# Patient Record
Sex: MALE | Race: ASIAN | NOT HISPANIC OR LATINO | Employment: OTHER | URBAN - METROPOLITAN AREA
[De-identification: names, ages, dates, MRNs, and addresses within clinical notes are randomized per-mention and may not be internally consistent; named-entity substitution may affect disease eponyms.]

---

## 2017-02-08 DIAGNOSIS — I10 BENIGN ESSENTIAL HYPERTENSION: Primary | ICD-10-CM

## 2017-02-08 RX ORDER — LOSARTAN POTASSIUM 25 MG/1
25 TABLET ORAL DAILY
Qty: 30 TABLET | Refills: 0 | Status: SHIPPED | OUTPATIENT
Start: 2017-02-08 | End: 2017-02-27

## 2017-02-08 NOTE — TELEPHONE ENCOUNTER
losartan (COZAAR) 25 MG tablet  Last Written Prescription Date:  2/23/16  Last Fill Quantity: 90,   # refills: 3  Last Office Visit with G, P or [facility] prescribing provider: 3/9/16  Future Office visit:   None    POTASSIUM   Date Value Ref Range Status   03/04/2016 4.0 3.4 - 5.3 mmol/L Final   ]  CREATININE   Date Value Ref Range Status   03/04/2016 0.83 0.66 - 1.25 mg/dL Final     BP Readings from Last 3 Encounters:   03/09/16 134/82   02/23/16 162/105   01/26/16 146/88     30 tabs to pharmacy. Annual f/u due next mth.

## 2017-02-27 DIAGNOSIS — I10 BENIGN ESSENTIAL HYPERTENSION: ICD-10-CM

## 2017-02-27 DIAGNOSIS — I51.4 MYOCARDITIS (H): ICD-10-CM

## 2017-02-27 RX ORDER — LOSARTAN POTASSIUM 25 MG/1
25 TABLET ORAL DAILY
Qty: 30 TABLET | Refills: 0 | Status: SHIPPED | OUTPATIENT
Start: 2017-02-27 | End: 2017-03-13

## 2017-02-27 RX ORDER — METOPROLOL SUCCINATE 25 MG/1
25 TABLET, EXTENDED RELEASE ORAL DAILY
Qty: 90 TABLET | Refills: 3 | Status: SHIPPED | OUTPATIENT
Start: 2017-02-27 | End: 2017-02-27

## 2017-02-27 RX ORDER — METOPROLOL SUCCINATE 25 MG/1
25 TABLET, EXTENDED RELEASE ORAL DAILY
Qty: 30 TABLET | Refills: 0 | Status: SHIPPED | OUTPATIENT
Start: 2017-02-27 | End: 2017-04-04

## 2017-02-27 NOTE — TELEPHONE ENCOUNTER
metoprolol (TOPROL XL) 25 MG 24 hr tablet  Last Written Prescription Date:  9/30/16  Last Fill Quantity: 90,   # refills: 1  Last Office Visit with G, P or [facility] prescribing provider: 3/9/16  Future Office visit:   None    BP Readings from Last 3 Encounters:   03/09/16 134/82   02/23/16 (!) 162/105   01/26/16 146/88       losartan (COZAAR) 25 MG tablet    Last Written Prescription Date:  2/8/17  Last Fill Quantity: 30,   # refills: 0  Potassium   Date Value Ref Range Status   03/04/2016 4.0 3.4 - 5.3 mmol/L Final   ]  Creatinine   Date Value Ref Range Status   03/04/2016 0.83 0.66 - 1.25 mg/dL Final   ]

## 2017-02-27 NOTE — LETTER
[facility]  [address]  [address] [facility] [address]      February 27, 2017      Sujata Valencia  [address]  [place] [address]        Dear Sujata,    This letter is a reminder that you are due to see your Primary Care Provider for an Annual Visit and needed labs. You must be seen by your Primary Care Provider on a yearly basis and have appropriate labs drawn for continued care and prescription refills. Please call [phone] to schedule an appointment for an Annual Visit with Dr MD. Uribe,    Primary Care Center

## 2017-03-13 DIAGNOSIS — I10 BENIGN ESSENTIAL HYPERTENSION: ICD-10-CM

## 2017-03-13 RX ORDER — LOSARTAN POTASSIUM 25 MG/1
25 TABLET ORAL DAILY
Qty: 30 TABLET | Refills: 0 | Status: SHIPPED | OUTPATIENT
Start: 2017-03-13 | End: 2017-04-04

## 2017-03-13 NOTE — LETTER
[facility] PRIMARY [facility]ARE CLIN[facility]  [address] [facility] [address]  [address] [facility] [address]      March 13, 2017      Sujata Valencia  [address] [place] [address]        Dear Sujata,    This letter is a reminder that you are due to see your Primary Care Provider for an Annual Visit. You must be seen by your Primary Care Provider on a yearly basis and have appropriate labs drawn for continued care and prescription refills. Please call [phone] to schedule an appointment for an Annual Visit with Dr Harris. You were last seen 3/19/2016.    [facility],    Primary Care Center

## 2017-03-13 NOTE — TELEPHONE ENCOUNTER
losartan (COZAAR) 25 MG tablet      Last Written Prescription Date:  2/27/2017  Last Fill Quantity: 30,   # refills: 0  Last Office Visit : 3/9/2016  Future Office visit:  Not scheduled    Potassium   Date Value Ref Range Status   03/04/2016 4.0 3.4 - 5.3 mmol/L Final   ]  Creatinine   Date Value Ref Range Status   03/04/2016 0.83 0.66 - 1.25 mg/dL Final   ]  BP Readings from Last 1 Encounters:   03/09/16 134/82       Appointment and labs due.  Last office visit and labs March 2016.  Potassium and Creatinine were done last 3/4/2016 .  Appointment reminder letter sent to patient.  Per protocol, OK to refill for 30 day supply only.

## 2017-03-23 ASSESSMENT — ENCOUNTER SYMPTOMS
STIFFNESS: 1
ARTHRALGIAS: 1
HEMATURIA: 0
DIFFICULTY URINATING: 0
DYSURIA: 1
JOINT SWELLING: 0
MUSCLE CRAMPS: 0
BACK PAIN: 1
FLANK PAIN: 0
MYALGIAS: 0
SKIN CHANGES: 0
POOR WOUND HEALING: 0
NECK PAIN: 0
NAIL CHANGES: 0
MUSCLE WEAKNESS: 0

## 2017-03-23 ASSESSMENT — ACTIVITIES OF DAILY LIVING (ADL)
ARE_THERE_FIREARMS_IN_YOUR_HOME?: N
DO_MEMBERS_OF_YOUR_HOUSEHOLD_WEAR_SEAT_BELTS?: Y
DO_MEMBERS_OF_YOUR_HOUSEHOLD_USE_SUNSCREEN?: N
ARE_THERE_SMOKE_DETECTORS_IN_YOUR_HOME?: Y
ARE_THERE_CARBON_MONOXIDE_DETECTORS_IN_YOUR_HOME?: N

## 2017-04-04 ENCOUNTER — OFFICE VISIT (OUTPATIENT)
Dept: INTERNAL MEDICINE | Facility: CLINIC | Age: 62
End: 2017-04-04

## 2017-04-04 VITALS
RESPIRATION RATE: 16 BRPM | HEIGHT: 72 IN | BODY MASS INDEX: 27.9 KG/M2 | SYSTOLIC BLOOD PRESSURE: 152 MMHG | TEMPERATURE: 97.9 F | DIASTOLIC BLOOD PRESSURE: 93 MMHG | OXYGEN SATURATION: 97 % | WEIGHT: 206 LBS | HEART RATE: 87 BPM

## 2017-04-04 DIAGNOSIS — N40.0 BENIGN NODULAR PROSTATIC HYPERPLASIA WITHOUT LOWER URINARY TRACT SYMPTOMS: ICD-10-CM

## 2017-04-04 DIAGNOSIS — I10 BENIGN ESSENTIAL HYPERTENSION: ICD-10-CM

## 2017-04-04 DIAGNOSIS — Z12.11 SCREEN FOR COLON CANCER: Primary | ICD-10-CM

## 2017-04-04 DIAGNOSIS — L98.9 SKIN LESION: ICD-10-CM

## 2017-04-04 RX ORDER — LOSARTAN POTASSIUM 25 MG/1
25 TABLET ORAL DAILY
Qty: 90 TABLET | Refills: 3 | Status: SHIPPED | OUTPATIENT
Start: 2017-04-04 | End: 2018-05-09

## 2017-04-04 RX ORDER — METOPROLOL SUCCINATE 25 MG/1
25 TABLET, EXTENDED RELEASE ORAL DAILY
Qty: 90 TABLET | Refills: 3 | Status: SHIPPED | OUTPATIENT
Start: 2017-04-04 | End: 2018-03-20

## 2017-04-04 ASSESSMENT — PAIN SCALES - GENERAL: PAINLEVEL: MILD PAIN (3)

## 2017-04-04 NOTE — PATIENT INSTRUCTIONS
Primary Care Center Medication Refill Request Information:  * Please contact your pharmacy regarding ANY request for medication refills.  ** [facility] Prescription Fax = [phone]  * Please allow 3 business days for routine medication refills.  * Please allow 5 business days for controlled substance medication refills.     Primary Care Center Test Result notification information:  *You will be notified with in 7-10 days of your appointment day regarding the results of your test.  If you are on MyChart you will be notified as soon as the provider has reviewed the results and signed off on them.    Dermatology [phone] (3rd Floor [facility])

## 2017-04-04 NOTE — MR AVS SNAPSHOT
After Visit Summary   4/4/2017    Sujata Valencia    MRN: [id number]           Patient Information     Date Of Birth          1955        Visit Information        Provider Department      4/4/2017 1:35 PM Wes Harris MD [facility]        Today's Diagnoses     Screen for colon cancer    -  1    Benign nodular prostatic hyperplasia without lower urinary tract symptoms        Skin lesion        Benign essential hypertension          Care Instructions    Primary Care Center Medication Refill Request Information:  * Please contact your pharmacy regarding ANY request for medication refills.  ** [facility] Prescription Fax = [phone]  * Please allow 3 business days for routine medication refills.  * Please allow 5 business days for controlled substance medication refills.     Primary Care Center Test Result notification information:  *You will be notified with in 7-10 days of your appointment day regarding the results of your test.  If you are on MyChart you will be notified as soon as the provider has reviewed the results and signed off on them.    Dermatology [phone] (3rd Floor [facility])           Follow-ups after your visit        Additional Services     DERMATOLOGY REFERRAL       Skin lesion on head            GASTROENTEROLOGY ADULT REF PROCEDURE ONLY       Last Lab Result: Creatinine (mg/dL)       Date                     Value                 03/04/2016               0.83             ----------  Body mass index is 28.33 kg/(m^2).     Needed:  No  Language:  English    Patient will be contacted to schedule procedure.     Please be aware that coverage of these services is subject to the terms and limitations of your health insurance plan.  Call member services at your health plan with any benefit or coverage questions.  Any procedures must be performed at a [facility] facility OR coordinated by your clinic's referral office.    Please bring the following  with you to your appointment:    (1) Any X-Rays, CTs or MRIs which have been performed.  Contact the facility where they were done to arrange for  prior to your scheduled appointment.    (2) List of current medications   (3) This referral request   (4) Any documents/labs given to you for this referral                  Your next 10 appointments already scheduled     Apr 07, 2017  9:15 AM CDT   LAB with  LAB   [facility] Lab ([facility])    [address] [place] [address]  [address] [facility] [id number] [phone]           Patient must bring picture ID.  Patient should be prepared to give a urine specimen  Please do not eat 10-12 hours before your appointment if you are coming in fasting for labs on lipids, cholesterol, or glucose (sugar).  Pregnant women should follow their Care Team instructions. Water with medications is okay. Do not drink coffee or other fluids.   If you have concerns about taking  your medications, please ask at office or if scheduling via iTwixiehart, send a message by clicking on Secure Messaging, Message Your Care Team.            Apr 07, 2017 10:00 AM CDT   Ech Complete with [id number]   [facility] Echo ([facility])    [address] [place] [address]  [address] [facility] [id number] [phone]           1.  Please bring or wear a comfortable two-piece outfit. 2.  You may eat, drink and take your normal medicines. 3.  For any questions that cannot be answered, please contact the ordering physician            Apr 26, 2017  1:05 PM CDT   (Arrive by 12:50 PM)   Return Visit with Wes Harris MD   [facility] ([facility])    [address] [place] [address]  [address] [facility] [id number] [phone]            May 09, 2017  9:00 AM CDT   (Arrive by 8:45 AM)   New Patient Visit with Aaron Maloney MD   [facility] Dermatology ([facility])    [address] [place] [address]  [facility] [address] [phone]              Future tests that were ordered for you today     Open Future Orders        Priority Expected Expires Ordered    Echocardiogram Routine  4/4/2018 4/4/2017    TSH with free T4 reflex Routine 4/4/2017 4/4/2018 4/4/2017    CBC with platelets Routine 4/4/2017 4/18/2017 4/4/2017    Comprehensive metabolic panel Routine 4/4/2017 4/18/2017 4/4/2017    Lipid panel reflex to direct LDL Routine 4/4/2017 4/18/2017 4/4/2017    Hemoglobin A1c Routine 4/4/2017 4/18/2017 4/4/2017    PSA screen Routine 4/4/2017 4/4/2018 4/4/2017    Vitamin D Deficiency Routine 4/4/2017 4/4/2018 4/4/2017    UA with Micro reflex to Culture Routine 4/4/2017 4/4/2018 4/4/2017            Who to contact     Please call your clinic at [phone] to:    Ask questions about your health    Make or cancel appointments    Discuss your medicines    Learn about your test results    Speak to your doctor   If you have compliments or concerns about an experience at your clinic, or if you wish to file a complaint, please contact [facility] Patient Relations at [phone] or email us at [email or web address]         Additional Information About Your Visit        Talismahart Information     RevoLazet gives you secure access to your electronic health record. If you see a primary care provider, you can also send messages to your care team and make appointments. If you have questions, please call your primary care clinic.  If you do not have a primary care provider, please call [phone] and they will assist you.      MailLift is an electronic gateway that provides easy, online access to your medical records. With MailLift, you can request a clinic appointment, read your test results, renew a prescription or communicate with your care team.     To access your existing account, please contact your [facility] or call [phone] for assistance.    "     Care EveryWhere ID     This is your Care EveryWhere ID. This could be used by other organizations to access your [facility] medical records  [id number]        Your Vitals Were     Pulse Temperature Respirations Height Pulse Oximetry BMI (Body Mass Index)    87 97.9  F (36.6  C) (Oral) 16 1.816 m (5' 11.5\") 97% 28.33 kg/m2       Blood Pressure from Last 3 Encounters:   04/04/17 (!) 152/93   03/09/16 134/82   02/23/16 (!) 162/105    Weight from Last 3 Encounters:   04/04/17 93.4 kg (206 lb)   03/09/16 93.3 kg (205 lb 9.6 oz)   02/23/16 94.9 kg (209 lb 4.8 oz)              We Performed the Following     DERMATOLOGY REFERRAL     GASTROENTEROLOGY ADULT REF PROCEDURE ONLY          Where to get your medicines      These medications were sent to [place] [id number] [place] - [address]  [address] [place] [facility] [address]     Phone:  [phone]     losartan 25 MG tablet    metoprolol 25 MG 24 hr tablet          Primary Care Provider Office Phone # Fax #    Wes Harris [phone]       [address] [address]        Thank you!     Thank you for choosing [facility]IMARY [facility]ARE [facility]IN[facility]  for your care. Our goal is always to provide you with excellent care. Hearing back from our patients is one way we can continue to improve our services. Please take a few minutes to complete the written survey that you may receive in the mail after your visit with us. Thank you!             Your Updated Medication List - Protect others around you: Learn how to safely use, store and throw away your medicines at www.disposemymeds.org.          This list is accurate as of: 4/4/17  2:50 PM.  Always use your most recent med list.                   Brand Name Dispense Instructions for use    aspirin 81 MG tablet      Take 1 tablet by mouth daily.       FISH OIL PO      Take 1 g by mouth daily       GINSENG PO      Take by mouth daily       losartan 25 MG tablet    COZAAR    90 " tablet    Take 1 tablet (25 mg) by mouth daily       metoprolol 25 MG 24 hr tablet    TOPROL XL    90 tablet    Take 1 tablet (25 mg) by mouth daily       MULTIVITAMINS PO      Take 1 tablet by mouth daily.       nicotine polacrilex 2 MG gum    NICORETTE    440 each    Place 1 each (2 mg) inside cheek as needed for smoking cessation *CHEW APPROX. 20 PCS PER DAY AS DIRECTED       olopatadine 0.1 % ophthalmic solution    PATANOL    1 Bottle    Place 1 drop into both eyes 2 times daily       psyllium 58.6 % Powd    METAMUCIL     Take 2 teaspoonful by mouth daily       sildenafil 100 MG cap/tab    REVATIO/VIAGRA    8 tablet    Take 0.5-1 tablets ( mg) by mouth daily as needed for erectile dysfunction

## 2017-04-04 NOTE — PROGRESS NOTES
SUBJECTIVE:    Mr. Valencia is a 61 year old male with pmh of HTN, HLD, and hyperglycemia. He presents to clinic today for an annual physical and for med check. He would like a full lab work up today because he hasn't been seen in over an year due to his son getting  and life moving quickly. He has persistent lower back pain that he has had for 20+ years - he has seen PT and does not want further work up at this point. He states he is still able to walk 4-5 miles 4-5x/week. The pain does not radiate and he has had no incontinence issues. He also complains of some mild burning sensation in the bottom of his toes - it is intermittent and does not bother him. He is okay with watchful waiting.     He denies any current chest pain or SOB but would like a referral for cardiac imaging as a follow up for an elevated troponin he had in 2014 that was suspected to be myocarditis. He had a normal coronary angiogram and a cardiac MRI that showed a dcreased EF.     He would also like refills on some medications and on his nicorette gum.    Past Medical History:   Diagnosis Date     Arthritis      Back pains, lower      Chest pain 7/28/2014     Other abnormal glucose 7/21/2011     Other and unspecified hyperlipidemia 7/21/2011     Subclinical hypothyroidism      Past Surgical History:   Procedure Laterality Date     COLONOSCOPY  1998    fistula      Family History   Problem Relation Age of Onset     OSTEOPOROSIS Mother      DIABETES Maternal Grandmother      Glaucoma No family hx of      Macular Degeneration No family hx of      Social History   Substance Use Topics     Smoking status: Former Smoker     Packs/day: 1.00     Years: 25.00     Quit date: 10/31/2001     Smokeless tobacco: Never Used     Alcohol use Yes      Comment: scotch 2 times a week, wine 5 days per week at drs request       Review Of Systems  Skin: positive for lesion on the top of his head  Eyes: negative  Ears/Nose/Throat: negative  Respiratory: No shortness  "of breath, dyspnea on exertion, cough, or hemoptysis  Cardiovascular: negative  Gastrointestinal: negative  Genitourinary: negative  Musculoskeletal: positive for low back pain  Neurologic: positive for burning sensation of toes  Psychiatric: negative  Hematologic/Lymphatic/Immunologic: negative  Endocrine: negative    OBJECTIVE:  BP (!) 152/93 (BP Location: Right arm, Patient Position: Chair, Cuff Size: Adult Large)  Pulse 87  Temp 97.9  F (36.6  C) (Oral)  Resp 16  Ht 1.816 m (5' 11.5\")  Wt 93.4 kg (206 lb)  SpO2 97%  BMI 28.33 kg/m2  GENERAL APPEARANCE: Alert, no acute distress  EYES: PERRL, EOM normal, conjunctiva and lids normal  HENT: Ears and TMs normal, oral mucosa and posterior oropharynx normal  NECK: No adenopathy,masses or thyromegaly  RESP: lungs clear to auscultation   CV: normal rate, regular rhythm, no murmur or gallop  ABDOMEN: soft, no organomegaly, masses or tenderness  MS: extremities normal, no peripheral edema  SKIN: rough looking raised papule on top of head, flesh colored, wart-like  NEURO: Alert, oriented, speech and mentation normal  PSYCHE: mentation appears normal, affect and mood normal    ASSESSMENT/PLAN:    1- Hyperglycemia: has hx of elevated A1C to 6.3 in 2016. Burning sensation of toes could be related to neuropathic changes - will continue to monitor.    - A1C   - CMP   - UA    2- Hypertension: is hypertensive to 152/93 today. Instructed to do daily blood pressure readings for 2 weeks and to check back in when he follows up in clinic for his lab tests. Could consider increasing BP meds slightly.    3- Hyperlipidemia: will do a fasting lipid test.    - lipid panel    4- subclinical hypothyroid: had borderline results.    - TSH    5- Hx of elevated troponin: had normal coronary angio and cardiac MRI showing decreased EF likely due to myocarditis. Was instructed to get follow up Echo. Is not symptomatic   - ECHO    6- Health Maintenance: is due for a colonoscopy - wanted to " discuss cologuard vs fit test vs colonoscopy - discussed colonoscopy being standard of care at this point. Patient agreeable to the procedure. Pt requesting Zoster vaccine - recommended checking with insurance regarding coverage.    - CBC   - PSA   - Vit D   - Colonoscopy referral   - Follow up in 2-4 weeks for lab results and BP check    I, hTelma Jurado am acting as scribe for Dr. Wes Harris MD.      Answers for HPI/ROS submitted by the patient on 3/23/2017   Annual Exam:  General Symptoms: No  Skin Symptoms: Yes  HENT Symptoms: No  EYE SYMPTOMS: No  HEART SYMPTOMS: No  LUNG SYMPTOMS: No  INTESTINAL SYMPTOMS: No  URINARY SYMPTOMS: Yes  REPRODUCTIVE SYMPTOMS: Yes  SKELETAL SYMPTOMS: Yes  BLOOD SYMPTOMS: No  NERVOUS SYSTEM SYMPTOMS: No  MENTAL HEALTH SYMPTOMS: No  Changes in hair: No  Changes in moles/birth marks: No  Itching: Yes  Rashes: Yes  Changes in nails: No  Acne: No  Change in facial hair: No  Warts: No  Non-healing sores: No  Scarring: No  Flaking of skin: Yes  Color changes of hands/feet in cold : No  Sun sensitivity: No  Skin thickening: No  Trouble holding urine or incontinence: No  Pain or burning: Yes  Trouble starting or stopping: No  Increased frequency of urination: No  Blood in urine: No  Decreased frequency of urination: No  Frequent nighttime urination: Yes  Flank pain: No  Difficulty emptying bladder: No  Back pain: Yes  Muscle aches: No  Neck pain: No  Swollen joints: No  Joint pain: Yes  Bone pain: Yes  Muscle cramps: No  Muscle weakness: No  Joint stiffness: Yes  Bone fracture: No  Scrotal pain or swelling: No  Erectile dysfunction: Yes  Penile discharge: No  Genital ulcers: No  Reduced libido: Yes  Duration of exercise:: Greater than 60 minutes    Staff note; I personally interviewed pt. And conducted physical examination. I agree with above assessment and plan. Will get resting echo and labs today and I will see him back in about 3 weeks.

## 2017-04-06 DIAGNOSIS — F17.200 SMOKING: ICD-10-CM

## 2017-04-06 NOTE — TELEPHONE ENCOUNTER
----- Message from Donn Kessler sent at 4/5/2017 12:47 PM CDT -----  Regarding: Refill  Contact: [phone]  Kourtney calling from [facility]     Pt is requesting refill on nicotine polacrilex (NICORETTE) 2 MG gum.    States he was seen yesterday in clinic.

## 2017-04-06 NOTE — TELEPHONE ENCOUNTER
SANDRA      Last Written Prescription Date:  11-16-16  Last Fill Quantity: 440,   # refills: 3  Last Office Visit : 4-4-17  Future Office visit:  4-26-17    BP Readings from Last 3 Encounters:   04/04/17 (!) 152/93   03/09/16 134/82   02/23/16 (!) 162/105       Kathleen M Doege RN

## 2017-04-07 ENCOUNTER — TELEPHONE (OUTPATIENT)
Dept: INTERNAL MEDICINE | Facility: CLINIC | Age: 62
End: 2017-04-07

## 2017-04-07 ENCOUNTER — RADIANT APPOINTMENT (OUTPATIENT)
Dept: CARDIOLOGY | Facility: CLINIC | Age: 62
End: 2017-04-07
Attending: INTERNAL MEDICINE

## 2017-04-07 DIAGNOSIS — N40.0 BENIGN NODULAR PROSTATIC HYPERPLASIA WITHOUT LOWER URINARY TRACT SYMPTOMS: ICD-10-CM

## 2017-04-07 DIAGNOSIS — L98.9 SKIN LESION: ICD-10-CM

## 2017-04-07 DIAGNOSIS — I10 BENIGN ESSENTIAL HYPERTENSION: ICD-10-CM

## 2017-04-07 DIAGNOSIS — Z12.11 SCREEN FOR COLON CANCER: ICD-10-CM

## 2017-04-07 DIAGNOSIS — R93.1 DECREASED CARDIAC EJECTION FRACTION: Primary | ICD-10-CM

## 2017-04-07 LAB
ALBUMIN SERPL-MCNC: 3.9 G/DL (ref 3.4–5)
ALBUMIN UR-MCNC: NEGATIVE MG/DL
ALP SERPL-CCNC: 74 U/L (ref 40–150)
ALT SERPL W P-5'-P-CCNC: 43 U/L (ref 0–70)
ANION GAP SERPL CALCULATED.3IONS-SCNC: 8 MMOL/L (ref 3–14)
APPEARANCE UR: CLEAR
AST SERPL W P-5'-P-CCNC: 24 U/L (ref 0–45)
BILIRUB SERPL-MCNC: 0.7 MG/DL (ref 0.2–1.3)
BILIRUB UR QL STRIP: NEGATIVE
BUN SERPL-MCNC: 15 MG/DL (ref 7–30)
CALCIUM SERPL-MCNC: 9 MG/DL (ref 8.5–10.1)
CHLORIDE SERPL-SCNC: 105 MMOL/L (ref 94–109)
CHOLEST SERPL-MCNC: 200 MG/DL
CO2 SERPL-SCNC: 27 MMOL/L (ref 20–32)
COLOR UR AUTO: YELLOW
CREAT SERPL-MCNC: 0.87 MG/DL (ref 0.66–1.25)
DEPRECATED CALCIDIOL+CALCIFEROL SERPL-MC: 31 UG/L (ref 20–75)
ERYTHROCYTE [DISTWIDTH] IN BLOOD BY AUTOMATED COUNT: 12 % (ref 10–15)
GFR SERPL CREATININE-BSD FRML MDRD: 88 ML/MIN/1.7M2
GLUCOSE SERPL-MCNC: 131 MG/DL (ref 70–99)
GLUCOSE UR STRIP-MCNC: NEGATIVE MG/DL
HBA1C MFR BLD: 6.1 % (ref 4.3–6)
HCT VFR BLD AUTO: 45.2 % (ref 40–53)
HDLC SERPL-MCNC: 51 MG/DL
HGB BLD-MCNC: 15.9 G/DL (ref 13.3–17.7)
HGB UR QL STRIP: NEGATIVE
KETONES UR STRIP-MCNC: NEGATIVE MG/DL
LDLC SERPL CALC-MCNC: 122 MG/DL
LEUKOCYTE ESTERASE UR QL STRIP: NEGATIVE
MCH RBC QN AUTO: 31.2 PG (ref 26.5–33)
MCHC RBC AUTO-ENTMCNC: 35.2 G/DL (ref 31.5–36.5)
MCV RBC AUTO: 89 FL (ref 78–100)
MUCOUS THREADS #/AREA URNS LPF: PRESENT /LPF
NITRATE UR QL: NEGATIVE
NONHDLC SERPL-MCNC: 149 MG/DL
PH UR STRIP: 6 PH (ref 5–7)
PLATELET # BLD AUTO: 177 10E9/L (ref 150–450)
POTASSIUM SERPL-SCNC: 4.3 MMOL/L (ref 3.4–5.3)
PROT SERPL-MCNC: 7.6 G/DL (ref 6.8–8.8)
PSA SERPL-ACNC: 0.4 UG/L (ref 0–4)
RBC # BLD AUTO: 5.1 10E12/L (ref 4.4–5.9)
RBC #/AREA URNS AUTO: 2 /HPF (ref 0–2)
SODIUM SERPL-SCNC: 140 MMOL/L (ref 133–144)
SP GR UR STRIP: 1.02 (ref 1–1.03)
TRIGL SERPL-MCNC: 134 MG/DL
TSH SERPL DL<=0.005 MIU/L-ACNC: 3.64 MU/L (ref 0.4–4)
URN SPEC COLLECT METH UR: ABNORMAL
UROBILINOGEN UR STRIP-MCNC: 0 MG/DL (ref 0–2)
WBC # BLD AUTO: 6.4 10E9/L (ref 4–11)
WBC #/AREA URNS AUTO: 1 /HPF (ref 0–2)

## 2017-04-07 NOTE — TELEPHONE ENCOUNTER
Placed cardiology referral for slight decreased EF this encounter        Dear Mr. Valencia;    Your resting cardiac echo shows some mild decrease in ejection fraction. I placed a cardiology referral today. You can call [phone] to schedule this appointment. Also, I recommend you keep your 4/26/17 follow up appointment with me.    CHICHO Harris MD

## 2017-04-11 ENCOUNTER — MYC MEDICAL ADVICE (OUTPATIENT)
Dept: CARDIOLOGY | Facility: CLINIC | Age: 62
End: 2017-04-11

## 2017-04-26 ENCOUNTER — OFFICE VISIT (OUTPATIENT)
Dept: INTERNAL MEDICINE | Facility: CLINIC | Age: 62
End: 2017-04-26

## 2017-04-26 ENCOUNTER — TELEPHONE (OUTPATIENT)
Dept: GASTROENTEROLOGY | Facility: OUTPATIENT CENTER | Age: 62
End: 2017-04-26

## 2017-04-26 VITALS
WEIGHT: 205 LBS | BODY MASS INDEX: 27.77 KG/M2 | RESPIRATION RATE: 16 BRPM | HEART RATE: 90 BPM | SYSTOLIC BLOOD PRESSURE: 154 MMHG | DIASTOLIC BLOOD PRESSURE: 90 MMHG | HEIGHT: 72 IN

## 2017-04-26 DIAGNOSIS — Z13.5 SCREENING FOR EYE CONDITION: Primary | ICD-10-CM

## 2017-04-26 DIAGNOSIS — I10 BENIGN ESSENTIAL HYPERTENSION: ICD-10-CM

## 2017-04-26 RX ORDER — LOSARTAN POTASSIUM 25 MG/1
50 TABLET ORAL DAILY
Qty: 60 TABLET | Refills: 3 | Status: SHIPPED | OUTPATIENT
Start: 2017-04-26 | End: 2017-05-04

## 2017-04-26 ASSESSMENT — PAIN SCALES - GENERAL: PAINLEVEL: NO PAIN (0)

## 2017-04-26 NOTE — MR AVS SNAPSHOT
After Visit Summary   4/26/2017    Sujata Valencia    MRN: [id number]           Patient Information     Date Of Birth          1955        Visit Information        Provider Department      4/26/2017 1:05 PM Wes Harris MD [facility]        Today's Diagnoses     Screening for eye condition    -  1    Benign essential hypertension          Care Instructions    Primary Care Center Medication Refill Request Information:  * Please contact your pharmacy regarding ANY request for medication refills.  ** [facility] Prescription Fax = [phone]  * Please allow 3 business days for routine medication refills.  * Please allow 5 business days for controlled substance medication refills.     Primary Care Center Test Result notification information:  *You will be notified with in 7-10 days of your appointment day regarding the results of your test.  If you are on MyChart you will be notified as soon as the provider has reviewed the results and signed off on them.    Primary Care Center [phone]     Ophthalmology (Eye) [phone]           Follow-ups after your visit        Additional Services     OPHTHALMOLOGY ADULT REFERRAL       Pt. Wants eye check                  Your next 10 appointments already scheduled     May 03, 2017 10:00 AM CDT   Colonoscopy with Franky Pinto MD   [facility] ([facility])    [address]  [address]  [place] [address] [phone]            May 04, 2017 10:15 AM CDT   New Visit with Daniel Rich MD   [facility] AT [place] ([facility])    [address]  [place] [id number] [phone]            May 09, 2017  9:00 AM CDT   (Arrive by 8:45 AM)   New Patient Visit with Aaron Maloney MD   [facility] ([facility])    [address]  3rd Floor  [place] [facility] [address] [phone]            May  10, 2017 10:15 AM CDT   RETURN GENERAL with Maria Teresa Cabrera MD   Eye Clinic ([facility])    [address]  [address]  [address]  [place] [address] [phone]            Jun 07, 2017 12:35 PM CDT   (Arrive by 12:20 PM)   Return Visit with Wes Harris MD   [facility] ([facility] [place])    [address]  4th Floor  [place] [id number] [phone]              Who to contact     Please call your clinic at [phone] to:    Ask questions about your health    Make or cancel appointments    Discuss your medicines    Learn about your test results    Speak to your doctor   If you have compliments or concerns about an experience at your clinic, or if you wish to file a complaint, please contact [facility] Patient Relations at [phone] or email us at [email or web address]         Additional Information About Your Visit        DesignFace ITharZoop Information     Dataguise gives you secure access to your electronic health record. If you see a primary care provider, you can also send messages to your care team and make appointments. If you have questions, please call your primary care clinic.  If you do not have a primary care provider, please call [phone] and they will assist you.      Dataguise is an electronic gateway that provides easy, online access to your medical records. With Dataguise, you can request a clinic appointment, read your test results, renew a prescription or communicate with your care team.     To access your existing account, please contact your [facility] Clinic or call [phone] for assistance.        Care EveryWhere ID     This is your Care EveryWhere ID. This could be used by other organizations to access your [place] medical records  [id number]        Your Vitals Were     Pulse Respirations Height BMI (Body Mass Index)          90 16 1.816 m (5'  "11.5\") 28.19 kg/m2         Blood Pressure from Last 3 Encounters:   04/26/17 154/90   04/04/17 (!) 152/93   03/09/16 134/82    Weight from Last 3 Encounters:   04/26/17 93 kg (205 lb)   04/04/17 93.4 kg (206 lb)   03/09/16 93.3 kg (205 lb 9.6 oz)              We Performed the Following     OPHTHALMOLOGY ADULT REFERRAL          Today's Medication Changes          These changes are accurate as of: 4/26/17  1:59 PM.  If you have any questions, ask your nurse or doctor.               These medicines have changed or have updated prescriptions.        Dose/Directions    * losartan 25 MG tablet   Commonly known as:  COZAAR   This may have changed:  Another medication with the same name was added. Make sure you understand how and when to take each.   Used for:  Benign essential hypertension, Screen for colon cancer, Benign nodular prostatic hyperplasia without lower urinary tract symptoms, Skin lesion   Changed by:  Wes Harris MD        Dose:  25 mg   Take 1 tablet (25 mg) by mouth daily   Quantity:  90 tablet   Refills:  3       * losartan 25 MG tablet   Commonly known as:  COZAAR   This may have changed:  You were already taking a medication with the same name, and this prescription was added. Make sure you understand how and when to take each.   Used for:  Benign essential hypertension   Changed by:  Wes Harris MD        Dose:  50 mg   Take 2 tablets (50 mg) by mouth daily   Quantity:  60 tablet   Refills:  3       * Notice:  This list has 2 medication(s) that are the same as other medications prescribed for you. Read the directions carefully, and ask your doctor or other care provider to review them with you.         Where to get your medicines      These medications were sent to [place] [id number] [place] - [address]  [address] [place] [address]     Phone:  [phone]     losartan 25 MG tablet                Primary Care Provider Office Phone # Fax #    Wes Harris [phone] " [phone]       [facility] [address] [address]        Thank you!     Thank you for choosing [facility]  for your care. Our goal is always to provide you with excellent care. Hearing back from our patients is one way we can continue to improve our services. Please take a few minutes to complete the written survey that you may receive in the mail after your visit with us. Thank you!             Your Updated Medication List - Protect others around you: Learn how to safely use, store and throw away your medicines at www.disposemymeds.org.          This list is accurate as of: 4/26/17  1:59 PM.  Always use your most recent med list.                   Brand Name Dispense Instructions for use    aspirin 81 MG tablet      Take 1 tablet by mouth daily.       FISH OIL PO      Take 1 g by mouth daily       GINSENG PO      Take by mouth daily       * losartan 25 MG tablet    COZAAR    90 tablet    Take 1 tablet (25 mg) by mouth daily       * losartan 25 MG tablet    COZAAR    60 tablet    Take 2 tablets (50 mg) by mouth daily       metoprolol 25 MG 24 hr tablet    TOPROL XL    90 tablet    Take 1 tablet (25 mg) by mouth daily       MULTIVITAMINS PO      Take 1 tablet by mouth daily.       nicotine polacrilex 2 MG gum    NICORETTE    440 each    Place 1 each (2 mg) inside cheek as needed for smoking cessation *CHEW APPROX. 20 PCS PER DAY AS DIRECTED       olopatadine 0.1 % ophthalmic solution    PATANOL    1 Bottle    Place 1 drop into both eyes 2 times daily       psyllium 58.6 % Powd    METAMUCIL     Take 2 teaspoonful by mouth daily       sildenafil 100 MG cap/tab    REVATIO/VIAGRA    8 tablet    Take 0.5-1 tablets ( mg) by mouth daily as needed for erectile dysfunction       * Notice:  This list has 2 medication(s) that are the same as other medications prescribed for you. Read the directions carefully, and ask your doctor or other care provider to review them with  you.

## 2017-04-26 NOTE — TELEPHONE ENCOUNTER
Patient taking any blood thinners ? Aspirin. Pt. not currently taking    Heart disease ? denies    Lung disease ? denies      Sleep apnea ? denies    Diabetic ? denies    Kidney disease ? denies    Dialysis ? n/a    Electronic implanted medical devices ? denies    Are you taking any narcotic pain medication ? no  What is your daily dosage ?    PTSD ? n/a    Prep instructions reviewed with patient ? Instructions,  policy, MAC sedation plan reviewed. Advised pt. To have someone stay with him post exam    Pharmacy : n/a    Indication for procedure :   Associated Diagnoses      Screen for colon cancer [Z12.11]  - Primary             Referring provider :  Order Providers      Authorizing Provider Encounter Provider     Wes Harris MD

## 2017-04-26 NOTE — PROGRESS NOTES
HPI:    Pt. Comes in  For follow up. He has had past detailed cardiac evaluation. He states he saw [facility] in [place] this past summer with normal resting echo. He is exercising more  Without problems. Around 7/14 he had elevated troponin and cor angiogram was found to be normal. Cardiac MRI showed decreased EF and possible myocarditis. He states URI sxs. But several months ago. He has old smoking history. He continues to use  EtOH; he does not think this is a problem; however, he brings this topic up at every visit.  He had normal exercise stress echo in 11/10. He is still due for colonoscopy.  He o/w denies additional HEENT, cardiopulmonary, abdominal, neurological, systemic complaints. He was seen by Dr. Rich, Cardiology 8/22/14 with repeat near normal resting cardiac echo. He still has elevated liver tests (from EtOH use?). He was seen by Dr. Choi, Health Psychology 2/5/16 and again 3/4/16. He had colonoscopy 3/14/16 scheduled but this was cancled. He was seen in DM education scheduled 3/1/16.       PE:    Vitals noted gen nad cooperative alert, HEENT, ears normal oropharynx clear no exudate, neck supple nl rom no adenopathy, LCTA, B, normal resp. System exam, RRR, S1, S2, abdomen, nt, nd, no masses,  Neurological exam B UE/LE/proximal/distal is normal and symmetric for sensation, reflexes, and strength. Gait is normal.           A/P:    1. He has followed up with Dr. Rich 8/22/14 Cardiology and resting echo with near normal function. He states  repeat Echo this past summer was normal.  Repeat Echo here 4/7/17 with slight decrease in EF; he has follow up with Dr. Rich Cardiology 5/4/17.  2. He has been following with Dr. Choi Health Psychology for stress management. He was seen 2/5/16 and again 3/4/16  3.Fasting lipids from 1/29/16 with  and HDL 55 (EtOH?)  4. He still needs Colonoscopy and scheduled for 5/17  PSA done 1/29/16.  5. A1C elevated; he will work on exercise/diet and  follow. He was seen in DM education 3/1/16 A1C 6.3%  6. HTN; sent in Rx. For Losartan 25 mg PO QD. He states he had cough with Lisinopril. He is able to tolerate Losartan and BP still somewhat elevated and will increase to 50 mg PO QD  7. Elevated liver; repeat 4/7/17 as normal. Placed order for abdominal U/S as well 2/7/17; he has not done this yet.         Total time spent 15 minutes.  More than 50% of the time spent with Mr. Valencia on counseling / coordinating his care

## 2017-04-26 NOTE — PATIENT INSTRUCTIONS
Primary Care Center Medication Refill Request Information:  * Please contact your pharmacy regarding ANY request for medication refills.  ** [facility] Prescription Fax = [phone]  * Please allow 3 business days for routine medication refills.  * Please allow 5 business days for controlled substance medication refills.     Primary Care Center Test Result notification information:  *You will be notified with in 7-10 days of your appointment day regarding the results of your test.  If you are on MyChart you will be notified as soon as the provider has reviewed the results and signed off on them.    Primary Care Center [phone]     Ophthalmology (Eye) [phone]

## 2017-05-02 ENCOUNTER — PRE VISIT (OUTPATIENT)
Dept: CARDIOLOGY | Facility: CLINIC | Age: 62
End: 2017-05-02

## 2017-05-03 ENCOUNTER — TRANSFERRED RECORDS (OUTPATIENT)
Dept: HEALTH INFORMATION MANAGEMENT | Facility: CLINIC | Age: 62
End: 2017-05-03

## 2017-05-03 ENCOUNTER — DOCUMENTATION ONLY (OUTPATIENT)
Dept: GASTROENTEROLOGY | Facility: OUTPATIENT CENTER | Age: 62
End: 2017-05-03
Attending: INTERNAL MEDICINE

## 2017-05-04 ENCOUNTER — OFFICE VISIT (OUTPATIENT)
Dept: CARDIOLOGY | Facility: CLINIC | Age: 62
End: 2017-05-04
Payer: COMMERCIAL

## 2017-05-04 VITALS
WEIGHT: 205 LBS | HEIGHT: 72 IN | BODY MASS INDEX: 27.77 KG/M2 | HEART RATE: 92 BPM | DIASTOLIC BLOOD PRESSURE: 80 MMHG | SYSTOLIC BLOOD PRESSURE: 130 MMHG

## 2017-05-04 DIAGNOSIS — I40.1 IDIOPATHIC MYOCARDITIS, UNSPECIFIED CHRONICITY: Primary | ICD-10-CM

## 2017-05-04 PROCEDURE — 99214 OFFICE O/P EST MOD 30 MIN: CPT | Performed by: INTERNAL MEDICINE

## 2017-05-04 NOTE — PROGRESS NOTES
HPI:     Please see dictated note    PAST MEDICAL HISTORY:  Past Medical History:   Diagnosis Date     Arthritis      Back pains, lower      Chest pain 7/28/2014     Hypertension      Myocarditis (H)      Other abnormal glucose 7/21/2011     Other and unspecified hyperlipidemia 7/21/2011     Subclinical hypothyroidism        CURRENT MEDICATIONS:  Current Outpatient Prescriptions   Medication Sig Dispense Refill     nicotine polacrilex (NICORETTE) 2 MG gum Place 1 each (2 mg) inside cheek as needed for smoking cessation *CHEW APPROX. 20 PCS PER DAY AS DIRECTED 440 each 3     losartan (COZAAR) 25 MG tablet Take 1 tablet (25 mg) by mouth daily 90 tablet 3     metoprolol (TOPROL XL) 25 MG 24 hr tablet Take 1 tablet (25 mg) by mouth daily 90 tablet 3     olopatadine (PATANOL) 0.1 % ophthalmic solution Place 1 drop into both eyes 2 times daily 1 Bottle 6     GINSENG PO Take by mouth daily       sildenafil (VIAGRA) 100 MG tablet Take 0.5-1 tablets ( mg) by mouth daily as needed for erectile dysfunction 8 tablet 12     psyllium (METAMUCIL) 58.6 % POWD Take 2 teaspoonful by mouth daily       Multiple Vitamin (MULTIVITAMINS PO) Take 1 tablet by mouth daily.       Omega-3 Fatty Acids (FISH OIL PO) Take 1 g by mouth daily        aspirin 81 MG tablet Take 1 tablet by mouth daily.       [DISCONTINUED] losartan (COZAAR) 25 MG tablet Take 2 tablets (50 mg) by mouth daily 60 tablet 3       PAST SURGICAL HISTORY:  Past Surgical History:   Procedure Laterality Date     COLONOSCOPY  1998    fistula      CORONARY ANGIOGRAPHY ADULT ORDER  07/29/2014    normal coronary arteries       ALLERGIES     Allergies   Allergen Reactions     Seasonal Allergies        FAMILY HISTORY:  Family History   Problem Relation Age of Onset     OSTEOPOROSIS Mother      DIABETES Maternal Grandmother      Glaucoma No family hx of      Macular Degeneration No family hx of        SOCIAL HISTORY:  Social History     Social History     Marital status:   "    Spouse name: N/A     Number of children: 1     Years of education: N/A     Occupational History      Unknown     SEMI-RETIRED, OWNS A [email or web address]     Social History Main Topics     Smoking status: Former Smoker     Packs/day: 1.00     Years: 25.00     Quit date: 10/31/2001     Smokeless tobacco: Never Used     Alcohol use Yes      Comment: scotch 2 times a week, wine 5 days per week at drs request     Drug use: No     Sexual activity: Not on file     Other Topics Concern     Not on file     Social History Narrative       ROS:   Constitutional: No fever, chills, or sweats. No weight gain/loss   ENT: No visual disturbance, ear ache, epistaxis, sore throat  Allergies/Immunologic: Negative.   Respiratory: No cough, hemoptysia  Cardiovascular: As per HPI  GI: No nausea, vomiting, hematemesis, melena, or hematochezia  : No urinary frequency, dysuria, or hematuria  Integument: Negative  Psychiatric: Negative  Neuro: Negative  Endocrinology: Negative   Musculoskeletal: Negative    EXAM:  /80  Pulse 92  Ht 1.816 m (5' 11.5\")  Wt 93 kg (205 lb)  BMI 28.19 kg/m2  In general, the patient is a pleasant male in no apparent distress.    HEENT: NC/AT.  PERRLA.  EOMI.  Sclerae white, not injected.  Nares clear.  Pharynx without erythema or exudate.  Dentition intact.    Neck: No adenopathy.  No thyromegaly. Carotids +4/4 bilaterally without bruits.  No jugular venous distension.   Heart: RRR. Normal S1, S2 splits physiologically. No murmur, rub, click, or gallop. The PMI is in the 5th ICS in the midclavicular line. There is no heave.    Lungs: CTA.  No ronchi, wheezes, rales.    Abdomen: Soft, nontender, nondistended.   Extremities: No clubbing, cyanosis, or edema.  The pulses are +4/4 at the radial, brachial, femoral, popliteal, DP, and PT sites bilaterally.    Neurologic: Alert and oriented to person/place/time, normal speech, gait and affect  Skin: No petechiae, purpura or rash.    Labs:  LIPID RESULTS:  Lab " Results   Component Value Date    CHOL 200 (H) 04/07/2017    HDL 51 04/07/2017     (H) 04/07/2017    TRIG 134 04/07/2017    CHOLHDLRATIO 4.6 10/03/2014    NHDL 149 (H) 04/07/2017       LIVER ENZYME RESULTS:  Lab Results   Component Value Date    AST 24 04/07/2017    ALT 43 04/07/2017       CBC RESULTS:  Lab Results   Component Value Date    WBC 6.4 04/07/2017    RBC 5.10 04/07/2017    HGB 15.9 04/07/2017    HCT 45.2 04/07/2017    MCV 89 04/07/2017    MCH 31.2 04/07/2017    MCHC 35.2 04/07/2017    RDW 12.0 04/07/2017     04/07/2017       BMP RESULTS:  Lab Results   Component Value Date     04/07/2017    POTASSIUM 4.3 04/07/2017    CHLORIDE 105 04/07/2017    CO2 27 04/07/2017    ANIONGAP 8 04/07/2017     (H) 04/07/2017    BUN 15 04/07/2017    CR 0.87 04/07/2017    GFRESTIMATED 88 04/07/2017    GFRESTBLACK >90   GFR Calc   04/07/2017    TRESSA 9.0 04/07/2017        A1C RESULTS:  Lab Results   Component Value Date    A1C 6.1 (H) 04/07/2017       INR RESULTS:  Lab Results   Component Value Date    INR 1.04 03/04/2016     ROGER Rich MD     CC  Patient Care Team:  Wes Harris MD as PCP - General (Internal Medicine)

## 2017-05-04 NOTE — MR AVS SNAPSHOT
After Visit Summary   5/4/2017    Sujata Valencia    MRN: [id number]           Patient Information     Date Of Birth          1955        Visit Information        Provider Department      5/4/2017 10:15 AM March, Daniel Langley MD [facility] [place]        Today's Diagnoses     Idiopathic myocarditis, unspecified chronicity    -  1       Follow-ups after your visit        Additional Services     Follow-Up with Cardiologist                 Your next 10 appointments already scheduled     May 09, 2017  9:00 AM CDT   (Arrive by 8:45 AM)   New Patient Visit with Aaron Maloney MD   [facility] Dermatology ([facility] [place])    [address]  3rd Floor  [place] [address]   [phone]            May 10, 2017 10:15 AM CDT   RETURN GENERAL with Maria Teresa Cabrera MD   Eye Clinic ([facility])    [address]  [address]  [place] [address]   [phone]            Jun 07, 2017 12:35 PM CDT   (Arrive by 12:20 PM)   Return Visit with Wes Harris MD   [facility] ([facility] [place])    [address]  4th Floor  [place] [address]   [phone]              Future tests that were ordered for you today     Open Future Orders        Priority Expected Expires Ordered    Echocardiogram Routine 5/4/2018 6/8/2018 5/4/2017    Follow-Up with Cardiologist Routine 5/4/2018 9/16/2018 5/4/2017    Lipid Profile Routine 5/4/2018 6/8/2018 5/4/2017            Who to contact     If you have questions or need follow up information about today's clinic visit or your schedule please contact [facility] [place] directly at [phone].  Normal or non-critical lab and imaging results will be communicated to you by MyChart, letter or phone within 4 business days after the clinic has received the results. If you do not hear from  "us within 7 days, please contact the clinic through H[employer]reight or phone. If you have a critical or abnormal lab result, we will notify you by phone as soon as possible.  Submit refill requests through [employer]reight or call your pharmacy and they will forward the refill request to us. Please allow 3 business days for your refill to be completed.          Additional Information About Your Visit        OncoscopeharGlowbiotics Information     [employer]ight gives you secure access to your electronic health record. If you see a primary care provider, you can also send messages to your care team and make appointments. If you have questions, please call your primary care clinic.  If you do not have a primary care provider, please call [phone] and they will assist you.        Care EveryWhere ID     This is your Care EveryWhere ID. This could be used by other organizations to access your [place] medical records  [id number]        Your Vitals Were     Pulse Height BMI (Body Mass Index)             92 1.816 m (5' 11.5\") 28.19 kg/m2          Blood Pressure from Last 3 Encounters:   05/04/17 130/80   04/26/17 154/90   04/04/17 (!) 152/93    Weight from Last 3 Encounters:   05/04/17 93 kg (205 lb)   04/26/17 93 kg (205 lb)   04/04/17 93.4 kg (206 lb)               Primary Care Provider Office Phone # Fax #    Wes Harris [phone]       [address] [address]        Thank you!     Thank you for choosing [facility] AT [place]  for your care. Our goal is always to provide you with excellent care. Hearing back from our patients is one way we can continue to improve our services. Please take a few minutes to complete the written survey that you may receive in the mail after your visit with us. Thank you!             Your Updated Medication List - Protect others around you: Learn how to safely use, store and throw away your medicines at www.disposemymeds.org.    "       This list is accurate as of: 5/4/17 10:51 AM.  Always use your most recent med list.                   Brand Name Dispense Instructions for use    aspirin 81 MG tablet      Take 1 tablet by mouth daily.       FISH OIL PO      Take 1 g by mouth daily       GINSENG PO      Take by mouth daily       losartan 25 MG tablet    COZAAR    90 tablet    Take 1 tablet (25 mg) by mouth daily       metoprolol 25 MG 24 hr tablet    TOPROL XL    90 tablet    Take 1 tablet (25 mg) by mouth daily       MULTIVITAMINS PO      Take 1 tablet by mouth daily.       nicotine polacrilex 2 MG gum    NICORETTE    440 each    Place 1 each (2 mg) inside cheek as needed for smoking cessation *CHEW APPROX. 20 PCS PER DAY AS DIRECTED       olopatadine 0.1 % ophthalmic solution    PATANOL    1 Bottle    Place 1 drop into both eyes 2 times daily       psyllium 58.6 % Powd    METAMUCIL     Take 2 teaspoonful by mouth daily       sildenafil 100 MG cap/tab    REVATIO/VIAGRA    8 tablet    Take 0.5-1 tablets ( mg) by mouth daily as needed for erectile dysfunction

## 2017-05-04 NOTE — LETTER
5/4/2017    Wes Harris MD  [address] [address]    RE: Sujata Valencia       Dear Colleague,    I had the pleasure of seeing Sujata Valencia in the [facility].    Mr. Valencia is a pleasant 61-year-old gentleman who I last saw in 08/2014.  He had presented in 07/2014 with symptoms of chest pain and was taken to the Cath Lab where he did not have any coronary artery disease on 07/29/2014.  However, his ejection fraction was somewhat depressed, so he went on to have a cardiac MRI on 08/05/2014 and that showed patchy late gadolinium enhancement consistent with myocarditis.  However, it was not felt to be acute as he did not have any edema.  He was managed with lisinopril and a beta blocker and his ejection fraction returned to near normal in the range of around 50% if you average all of his echo reports.  I have actually reviewed his echo images from 08/22/2014 as well as his recent echo images from 04/07/2017 and despite there being a difference of ejection fraction in 2014, calling it at 50-55 and then in 2014 calling it at 45-50.  In fact, there is really no change at all and it is probably in the range of 50%-55%.  He is currently not having any concerning symptoms.  He is exercising, walking 4.5 miles a day, doing an average of about a mile and a half over 20 minutes without any symptoms of chest pain, tightness or shortness of breath.  His weight has been very stable with a BMI of 28.  His blood pressure is under reasonable control on metoprolol 25 a day, Cozaar 25 a day that he has been on.  He is taking a baby aspirin but is maybe going to come off that, which I think is reasonable because he has some gastritis.  His most recent cholesterol screen from 04/07/2017, his HDL was 51 with an LDL of 122.  His A1c is normal at 6.1.     Outpatient Encounter Prescriptions as of 5/4/2017   Medication Sig Dispense Refill     nicotine  polacrilex (NICORETTE) 2 MG gum Place 1 each (2 mg) inside cheek as needed for smoking cessation *CHEW APPROX. 20 PCS PER DAY AS DIRECTED 440 each 3     losartan (COZAAR) 25 MG tablet Take 1 tablet (25 mg) by mouth daily 90 tablet 3     metoprolol (TOPROL XL) 25 MG 24 hr tablet Take 1 tablet (25 mg) by mouth daily 90 tablet 3     [DISCONTINUED] olopatadine (PATANOL) 0.1 % ophthalmic solution Place 1 drop into both eyes 2 times daily 1 Bottle 6     GINSENG PO Take by mouth daily       sildenafil (VIAGRA) 100 MG tablet Take 0.5-1 tablets ( mg) by mouth daily as needed for erectile dysfunction 8 tablet 12     psyllium (METAMUCIL) 58.6 % POWD Take 2 teaspoonful by mouth daily       Multiple Vitamin (MULTIVITAMINS PO) Take 1 tablet by mouth daily.       Omega-3 Fatty Acids (FISH OIL PO) Take 1 g by mouth daily        aspirin 81 MG tablet Take 1 tablet by mouth daily.       [DISCONTINUED] losartan (COZAAR) 25 MG tablet Take 2 tablets (50 mg) by mouth daily 60 tablet 3     No facility-administered encounter medications on file as of 5/4/2017.       IMPRESSION, REPORT, PLAN:   1.  History of myocarditis with ejection fraction of around 50%, unchanged compared to 2014.   2.  Hyperlipidemia, reasonably controlled without coronary artery disease with an LDL of 122, not on treatment.   3.  Hypertension, reasonably controlled on Cozaar 25 and metoprolol 25 a day.      DISCUSSION:  It was a pleasure to see Mr. Valencia in followup.  Clinically, he is actually doing really well.  He has no concerning symptoms.  He has limited his alcohol use to not more than 2 drinks a day.  His echo is unchanged on ihos-qw-hmpy comparison and probably his ejection fraction is in the range of 50%-55% in looking at his images.  I would continue his medication regimen that he is on without any changes.  We discussed followup in a year, continue his excellent exercise routine as he is doing and call if he has any concerning issues before I see him  back.  He understands and is in agreement with the plan as outlined.  He is expecting his first grand baby in December, which is exciting.  His son is a physician, currently working in [place] but maybe transferring to [facility] [employer] this summer.  Please do not hesitate to contact me with any questions or concerns.     Again, thank you for allowing me to participate in the care of your patient.      Sincerely,    Daniel Rich MD     [facility]

## 2017-05-05 LAB — COPATH REPORT: NORMAL

## 2017-05-05 NOTE — PROGRESS NOTES
HISTORY OF PRESENT ILLNESS:  Mr. Valencia is a pleasant 61-year-old gentleman who I last saw in 08/2014.  He had presented in 07/2014 with symptoms of chest pain and was taken to the Cath Lab where he did not have any coronary artery disease on 07/29/2014.  However, his ejection fraction was somewhat depressed, so he went on to have a cardiac MRI on 08/05/2014 and that showed patchy late gadolinium enhancement consistent with myocarditis.  However, it was not felt to be acute as he did not have any edema.  He was managed with lisinopril and a beta blocker and his ejection fraction returned to near normal in the range of around 50% if you average all of his echo reports.  I have actually reviewed his echo images from 08/22/2014 as well as his recent echo images from 04/07/2017 and despite there being a difference of ejection fraction in 2014, calling it at 50-55 and then in 2014 calling it at 45-50.  In fact, there is really no change at all and it is probably in the range of 50%-55%.  He is currently not having any concerning symptoms.  He is exercising, walking 4.5 miles a day, doing an average of about a mile and a half over 20 minutes without any symptoms of chest pain, tightness or shortness of breath.  His weight has been very stable with a BMI of 28.  His blood pressure is under reasonable control on metoprolol 25 a day, Cozaar 25 a day that he has been on.  He is taking a baby aspirin but is maybe going to come off that, which I think is reasonable because he has some gastritis.  His most recent cholesterol screen from 04/07/2017, his HDL was 51 with an LDL of 122.  His A1c is normal at 6.1.      IMPRESSION, REPORT, PLAN:   1.  History of myocarditis with ejection fraction of around 50%, unchanged compared to 2014.   2.  Hyperlipidemia, reasonably controlled without coronary artery disease with an LDL of 122, not on treatment.   3.  Hypertension, reasonably controlled on Cozaar 25 and metoprolol 25 a day.       DISCUSSION:  It was a pleasure to see Mr. Reyes in followup.  Clinically, he is actually doing really well.  He has no concerning symptoms.  He has limited his alcohol use to not more than 2 drinks a day.  His echo is unchanged on fyop-xv-irss comparison and probably his ejection fraction is in the range of 50%-55% in looking at his images.  I would continue his medication regimen that he is on without any changes.  We discussed followup in a year, continue his excellent exercise routine as he is doing and call if he has any concerning issues before I see him back.  He understands and is in agreement with the plan as outlined.  He is expecting his first grand baby in December, which is exciting.  His son is a physician, currently working in [place] but maybe transferring to [employer] this summer.  Please do not hesitate to contact me with any questions or concerns.         LUCAS BUNCH MD             D: 2017 11:06   T: 2017 19:58   MT: [id number]      Name:     ZULEIKA REYES   MRN:      [id number]        Account:      [id number]   :      1955           Service Date: 2017      Document: [id number]

## 2017-05-09 ENCOUNTER — OFFICE VISIT (OUTPATIENT)
Dept: DERMATOLOGY | Facility: CLINIC | Age: 62
End: 2017-05-09

## 2017-05-09 DIAGNOSIS — D48.5 NEOPLASM OF UNCERTAIN BEHAVIOR OF SKIN: Primary | ICD-10-CM

## 2017-05-09 RX ORDER — LIDOCAINE HYDROCHLORIDE AND EPINEPHRINE 10; 10 MG/ML; UG/ML
3 INJECTION, SOLUTION INFILTRATION; PERINEURAL ONCE
Qty: 3 ML | Refills: 0 | OUTPATIENT
Start: 2017-05-09 | End: 2017-05-10

## 2017-05-09 ASSESSMENT — PAIN SCALES - GENERAL
PAINLEVEL: NO PAIN (0)
PAINLEVEL: NO PAIN (0)

## 2017-05-09 NOTE — LETTER
5/9/2017       RE: Sujata Valencia  [address] [place] [address]     Dear Colleague,    Thank you for referring your patient, Sujata Valencia, to the [facility] DER[facility] at [facility]. Please see a copy of my visit note below.        Pictures were placed in Pt's chart today for future reference.      CHIEF COMPLAINT:  Irritated spot on scalp.      SUBJECTIVE:  Mr. Valencia is a very pleasant 61-year-old male presenting with a several-year history of an irritated spot on the right crown of his scalp.  He saw Dr. Wyatt Verduzco in our clinic for this issue in 10/2014 and it was treated with liquid nitrogen, but the patient reports that it rapidly recurred.  The area is persistently irritated, with rare bleeding and also is repeatedly traumatized.  He is hoping that it could be removed today.  Otherwise, he has no areas of itch, pain or bleeding and no new or changing moles.      REVIEW OF SYSTEMS:  No recent fevers.      PHYSICAL EXAMINATION:   GENERAL:  This is a well-appearing, well-nourished male with a normal mood and affect who is oriented x3.   SKIN:  On the right crown of the scalp, there are 4 clustered verrucous, papillomatous 2-3 mm papules with an erythematous macular halo.      ASSESSMENT AND PLAN:  Likely inflamed seborrheic keratosis.  He has failed liquid nitrogen therapy in the past and this area is highly bothersome to him.  Thus, a shave biopsy was performed today for diagnosis and therapy.  Please see the procedure note below.  Otherwise, he will follow up in our clinic on an as-needed basis.      PROCEDURE NOTE:  After signed informed consent, the affected area was swabbed with an alcohol pad and injected with 1% lidocaine with epinephrine.  A biopsy via shave technique was taken and sent for histopathology.  Hemostasis was achieved with aluminum chloride 20% and the defect was covered with petrolatum and a bandage.  The patient tolerated this procedure  without complication.       Aaron Maloney MD  Dermatology Attending

## 2017-05-09 NOTE — MR AVS SNAPSHOT
After Visit Summary   5/9/2017    Sujata Valencia    MRN: [id number]           Patient Information     Date Of Birth          1955        Visit Information        Provider Department      5/9/2017 9:00 AM Aaron Maloney MD [facility] D[facility]        Today's Diagnoses     Neoplasm of uncertain behavior of skin    -  1      Care Instructions    Wound Care After a Biopsy    What is a skin biopsy?  A skin biopsy allows the doctor to examine a very small piece of tissue under the microscope to determine the diagnosis and the best treatment for the skin condition. A local anesthetic (numbing medicine)  is injected with a very small needle into the skin area to be tested. A small piece of skin is taken from the area. Sometimes a suture (stitch) is used.     What are the risks of a skin biopsy?  I will experience scar, bleeding, swelling, pain, crusting and redness. I may experience incomplete removal or recurrence. Risks of this procedure are excessive bleeding, bruising, infection, nerve damage, numbness, thick (hypertrophic or keloidal) scar and non-diagnostic biopsy.    How should I care for my wound for the first 24 hours?    Keep the wound dry and covered for 24 hours    If it bleeds, hold direct pressure on the area for 15 minutes. If bleeding does not stop then go to the emergency room    Avoid strenuous exercise the first 1-2 days or as your doctor instructs you    How should I care for the wound after 24 hours?    After 24 hours, remove the bandage    You may bathe or shower as normal    If you had a scalp biopsy, you can shampoo as usual and can use shower water to clean the biopsy site daily    Clean the wound twice a day with gentle soap and water    Do not scrub, be gentle    Apply white petroleum/Vaseline after cleaning the wound with a cotton swab or a clean finger, and keep the site covered with a Bandaid /bandage. Bandages are not necessary with a scalp biopsy    If you are  unable to cover the site with a Bandaid /bandage, re-apply ointment 2-3 times a day to keep the site moist. Moisture will help with healing    Avoid strenuous activity for first 1-2 days    Avoid lakes, rivers, pools, and oceans until the stitches are removed or the site is healed    How do I clean my wound?    Wash hands for 15 minutes with soap or use hand  before all wound care    Clean the wound with gentle soap and water    Apply white petroleum/Vaseline  to wound after it is clean    Replace the Bandaid /bandage to keep the wound covered for the first few days or as instructed by your doctor    If you had a scalp biopsy, warm shower water to the area on a daily basis should suffice    What should I use to clean my wound?     Cotton-tipped applicators (Qtips )    White petroleum jelly (Vaseline ). Use a clean new container and use Q-tips to apply.    Bandaids   as needed    Gentle soap     How should I care for my wound long term?    Do not get your wound dirty    Keep up with wound care for one week or until the area is healed.    A small scab will form and fall off by itself when the area is completely healed. The area will be red and will become pink in color as it heals. Sun protection is very important for how your scar will turn out. Sunscreen with an SPF 30 or greater is recommended once the area is healed.    If you have stitches, stitches need to be removed in  days. You may return to our clinic for this or you may have it done locally at your doctor s office.    You should have some soreness but it should be mild and slowly go away over several days. Talk to your doctor about using tylenol for pain,    When should I call my doctor?  If you have increased:     Pain or swelling    Pus or drainage (clear or slightly yellow drainage is ok)    Temperature over 100F    Spreading redness or warmth around wound    When will I hear about my results?  The biopsy results can take 2-3 weeks to come back.  The clinic will call you with the results, send you a [email or web address] message, or have you schedule a follow-up clinic or phone time to discuss the results. Contact our clinics if you do not hear from us in 3 weeks.     Who should I call with questions?    [facility]: [phone]     [facility]: [phone]    For urgent needs outside of business hours call the [facility] at [phone] and ask for the dermatology resident on call            Follow-ups after your visit        Your next 10 appointments already scheduled     May 10, 2017 10:15 AM CDT   RETURN GENERAL with Maria Teresa Cabrera MD   Eye Clinic ([facility])    [facility]  [address]  [address]  [facility] [address] [phone]            Jun 07, 2017 12:35 PM CDT   (Arrive by 12:20 PM)   Return Visit with Wes Harris MD   [facility] ([facility])    [address]  [address] [facility] [address] [phone]              Who to contact     Please call your clinic at [phone] to:    Ask questions about your health    Make or cancel appointments    Discuss your medicines    Learn about your test results    Speak to your doctor   If you have compliments or concerns about an experience at your clinic, or if you wish to file a complaint, please contact [facility] Patient Relations at [phone] or email us at [email or web address]         Additional Information About Your Visit        [employer] Information     [employer] gives you secure access to your electronic health record. If you see a primary care provider, you can also send messages to your care team and make appointments. If you have questions, please call your primary care clinic.  If you do not have a primary care provider, please call [phone] and they will assist you.      [employer] is an  electronic gateway that provides easy, online access to your medical records. With [employer], you can request a clinic appointment, read your test results, renew a prescription or communicate with your care team.     To access your existing account, please contact your [facility] or call [phone] for assistance.        Care EveryWhere ID     This is your Care EveryWhere ID. This could be used by other organizations to access your [place] medical records  [id number]         Blood Pressure from Last 3 Encounters:   05/04/17 130/80   04/26/17 154/90   04/04/17 (!) 152/93    Weight from Last 3 Encounters:   05/04/17 93 kg (205 lb)   04/26/17 93 kg (205 lb)   04/04/17 93.4 kg (206 lb)              We Performed the Following     BIOPSY SKIN/SUBQ/MUC MEM, SINGLE LESION     Surgical pathology exam          Today's Medication Changes          These changes are accurate as of: 5/9/17  9:35 AM.  If you have any questions, ask your nurse or doctor.               Start taking these medicines.        Dose/Directions    lidocaine 1% with EPINEPHrine 1:100,000 1 %-1:920228 injection   Used for:  Neoplasm of uncertain behavior of skin        Dose:  3 mL   Inject 3 mLs into the skin once for 1 dose   Quantity:  3 mL   Refills:  0            Where to get your medicines      Some of these will need a paper prescription and others can be bought over the counter.  Ask your nurse if you have questions.     You don't need a prescription for these medications     lidocaine 1% with EPINEPHrine 1:100,000 1 %-1:070251 injection                Primary Care Provider Office Phone # Fax #    Wes Harris [phone]       [address] [address]        Thank you!     Thank you for choosing [facility] DERMATOLOGY  for your care. Our goal is always to provide you with excellent care. Hearing back from our patients is one way we can continue to improve our  services. Please take a few minutes to complete the written survey that you may receive in the mail after your visit with us. Thank you!             Your Updated Medication List - Protect others around you: Learn how to safely use, store and throw away your medicines at www.disposemymeds.org.          This list is accurate as of: 5/9/17  9:35 AM.  Always use your most recent med list.                   Brand Name Dispense Instructions for use    aspirin 81 MG tablet      Take 1 tablet by mouth daily.       FISH OIL PO      Take 1 g by mouth daily       GINSENG PO      Take by mouth daily       lidocaine 1% with EPINEPHrine 1:100,000 1 %-1:268178 injection     3 mL    Inject 3 mLs into the skin once for 1 dose       losartan 25 MG tablet    COZAAR    90 tablet    Take 1 tablet (25 mg) by mouth daily       metoprolol 25 MG 24 hr tablet    TOPROL XL    90 tablet    Take 1 tablet (25 mg) by mouth daily       MULTIVITAMINS PO      Take 1 tablet by mouth daily.       nicotine polacrilex 2 MG gum    NICORETTE    440 each    Place 1 each (2 mg) inside cheek as needed for smoking cessation *CHEW APPROX. 20 PCS PER DAY AS DIRECTED       olopatadine 0.1 % ophthalmic solution    PATANOL    1 Bottle    Place 1 drop into both eyes 2 times daily       psyllium 58.6 % Powd    METAMUCIL     Take 2 teaspoonful by mouth daily       sildenafil 100 MG cap/tab    REVATIO/VIAGRA    8 tablet    Take 0.5-1 tablets ( mg) by mouth daily as needed for erectile dysfunction

## 2017-05-09 NOTE — PATIENT INSTRUCTIONS
Wound Care After a Biopsy    What is a skin biopsy?  A skin biopsy allows the doctor to examine a very small piece of tissue under the microscope to determine the diagnosis and the best treatment for the skin condition. A local anesthetic (numbing medicine)  is injected with a very small needle into the skin area to be tested. A small piece of skin is taken from the area. Sometimes a suture (stitch) is used.     What are the risks of a skin biopsy?  I will experience scar, bleeding, swelling, pain, crusting and redness. I may experience incomplete removal or recurrence. Risks of this procedure are excessive bleeding, bruising, infection, nerve damage, numbness, thick (hypertrophic or keloidal) scar and non-diagnostic biopsy.    How should I care for my wound for the first 24 hours?    Keep the wound dry and covered for 24 hours    If it bleeds, hold direct pressure on the area for 15 minutes. If bleeding does not stop then go to the emergency room    Avoid strenuous exercise the first 1-2 days or as your doctor instructs you    How should I care for the wound after 24 hours?    After 24 hours, remove the bandage    You may bathe or shower as normal    If you had a scalp biopsy, you can shampoo as usual and can use shower water to clean the biopsy site daily    Clean the wound twice a day with gentle soap and water    Do not scrub, be gentle    Apply white petroleum/Vaseline after cleaning the wound with a cotton swab or a clean finger, and keep the site covered with a Bandaid /bandage. Bandages are not necessary with a scalp biopsy    If you are unable to cover the site with a Bandaid /bandage, re-apply ointment 2-3 times a day to keep the site moist. Moisture will help with healing    Avoid strenuous activity for first 1-2 days    Avoid lakes, rivers, pools, and oceans until the stitches are removed or the site is healed    How do I clean my wound?    Wash hands for 15 minutes with soap or use hand  before  all wound care    Clean the wound with gentle soap and water    Apply white petroleum/Vaseline  to wound after it is clean    Replace the Bandaid /bandage to keep the wound covered for the first few days or as instructed by your doctor    If you had a scalp biopsy, warm shower water to the area on a daily basis should suffice    What should I use to clean my wound?     Cotton-tipped applicators (Qtips )    White petroleum jelly (Vaseline ). Use a clean new container and use Q-tips to apply.    Bandaids   as needed    Gentle soap     How should I care for my wound long term?    Do not get your wound dirty    Keep up with wound care for one week or until the area is healed.    A small scab will form and fall off by itself when the area is completely healed. The area will be red and will become pink in color as it heals. Sun protection is very important for how your scar will turn out. Sunscreen with an SPF 30 or greater is recommended once the area is healed.    If you have stitches, stitches need to be removed in  days. You may return to our clinic for this or you may have it done locally at your doctor s office.    You should have some soreness but it should be mild and slowly go away over several days. Talk to your doctor about using tylenol for pain,    When should I call my doctor?  If you have increased:     Pain or swelling    Pus or drainage (clear or slightly yellow drainage is ok)    Temperature over 100F    Spreading redness or warmth around wound    When will I hear about my results?  The biopsy results can take 2-3 weeks to come back. The clinic will call you with the results, send you a [employer] message, or have you schedule a follow-up clinic or phone time to discuss the results. Contact our clinics if you do not hear from us in 3 weeks.     Who should I call with questions?    [facility]: [phone]     [facility]: [phone]    For  urgent needs outside of business hours call the [facility] at [phone] and ask for the dermatology resident on call

## 2017-05-09 NOTE — PROGRESS NOTES
CHIEF COMPLAINT:  Irritated spot on scalp.      SUBJECTIVE:  Mr. Valencia is a very pleasant 61-year-old male presenting with a several-year history of an irritated spot on the right crown of his scalp.  He saw Dr. Wyatt Verduzco in our clinic for this issue in 10/2014 and it was treated with liquid nitrogen, but the patient reports that it rapidly recurred.  The area is persistently irritated, with rare bleeding and also is repeatedly traumatized.  He is hoping that it could be removed today.  Otherwise, he has no areas of itch, pain or bleeding and no new or changing moles.      REVIEW OF SYSTEMS:  No recent fevers.      PHYSICAL EXAMINATION:   GENERAL:  This is a well-appearing, well-nourished male with a normal mood and affect who is oriented x3.   SKIN:  On the right crown of the scalp, there are 4 clustered verrucous, papillomatous 2-3 mm papules with an erythematous macular halo.      ASSESSMENT AND PLAN:  Likely inflamed seborrheic keratosis.  He has failed liquid nitrogen therapy in the past and this area is highly bothersome to him.  Thus, a shave biopsy was performed today for diagnosis and therapy.  Please see the procedure note below.  Otherwise, he will follow up in our clinic on an as-needed basis.      PROCEDURE NOTE:  After signed informed consent, the affected area was swabbed with an alcohol pad and injected with 1% lidocaine with epinephrine.  A biopsy via shave technique was taken and sent for histopathology.  Hemostasis was achieved with aluminum chloride 20% and the defect was covered with petrolatum and a bandage.  The patient tolerated this procedure without complication.       Aaron Maloney MD  Dermatology Attending

## 2017-05-09 NOTE — NURSING NOTE
"Dermatology Rooming Note    Sujata Valencia's goals for this visit include:   Chief Complaint   Patient presents with     Derm Problem     Sujata is here with one lesion of concern onhis scalp, states \" I want this removed.\"     Gabrielle Graham LPN  "

## 2017-05-10 ENCOUNTER — OFFICE VISIT (OUTPATIENT)
Dept: OPHTHALMOLOGY | Facility: CLINIC | Age: 62
End: 2017-05-10
Attending: OPHTHALMOLOGY
Payer: COMMERCIAL

## 2017-05-10 DIAGNOSIS — H10.13 ALLERGIC CONJUNCTIVITIS, BILATERAL: ICD-10-CM

## 2017-05-10 DIAGNOSIS — H52.4 PRESBYOPIA OU: ICD-10-CM

## 2017-05-10 DIAGNOSIS — R73.03 BORDERLINE TYPE 2 DIABETES MELLITUS: Primary | ICD-10-CM

## 2017-05-10 PROCEDURE — 92015 DETERMINE REFRACTIVE STATE: CPT | Mod: ZF

## 2017-05-10 PROCEDURE — 99212 OFFICE O/P EST SF 10 MIN: CPT | Mod: ZF

## 2017-05-10 RX ORDER — OLOPATADINE HYDROCHLORIDE 1 MG/ML
1 SOLUTION/ DROPS OPHTHALMIC 2 TIMES DAILY
Qty: 1 BOTTLE | Refills: 6 | Status: SHIPPED | OUTPATIENT
Start: 2017-05-10 | End: 2017-12-11

## 2017-05-10 ASSESSMENT — SLIT LAMP EXAM - LIDS
COMMENTS: NORMAL
COMMENTS: NORMAL

## 2017-05-10 ASSESSMENT — VISUAL ACUITY
OD_SC+: -2
METHOD: SNELLEN - LINEAR
OS_SC: J5
OS_SC: 20/20
OD_SC: J5
OD_SC: 20/20

## 2017-05-10 ASSESSMENT — REFRACTION_MANIFEST
OS_SPHERE: PLANO
OS_CYLINDER: +0.50
OD_CYLINDER: +0.50
OS_ADD: +2.00
OD_AXIS: 170
OS_AXIS: 175
OD_SPHERE: +0.25
OD_ADD: +2.00

## 2017-05-10 ASSESSMENT — TONOMETRY
OD_IOP_MMHG: 14
OS_IOP_MMHG: 13
IOP_METHOD: TONOPEN

## 2017-05-10 ASSESSMENT — CUP TO DISC RATIO
OS_RATIO: 0.2
OD_RATIO: 0.2

## 2017-05-10 ASSESSMENT — EXTERNAL EXAM - RIGHT EYE: OD_EXAM: NORMAL

## 2017-05-10 ASSESSMENT — CONF VISUAL FIELD
OS_NORMAL: 1
OD_NORMAL: 1
METHOD: COUNTING FINGERS

## 2017-05-10 ASSESSMENT — EXTERNAL EXAM - LEFT EYE: OS_EXAM: NORMAL

## 2017-05-10 NOTE — MR AVS SNAPSHOT
After Visit Summary   5/10/2017    Sujata Valencia    MRN: [id number]           Patient Information     Date Of Birth          1955        Visit Information        Provider Department      5/10/2017 10:15 AM Maria Teresa Cabrera MD [facility]        Today's Diagnoses     Borderline type 2 diabetes mellitus    -  1    Allergic conjunctivitis, bilateral        Presbyopia OU           Follow-ups after your visit        Follow-up notes from your care team     Return in about 1 year (around 5/10/2018).      Your next 10 appointments already scheduled     May 17, 2017  9:00 AM CDT   US ABDOMEN COMPLETE with UCUS2   [facility] ([facility])    [address] [place] [address]  [address] [facility] [address] [phone]           Please bring a list of your medicines (including vitamins, minerals and over-the-counter drugs). Also, tell your doctor about any allergies you may have. Wear comfortable clothes and leave your valuables at home.  Adults: No eating or drinking for 8 hours before the exam. You may take medicine with a small sip of water.  Children: - Children 6+ years: No food or drink for 6 hours before exam. - Children 1-5 years: No food or drink for 4 hours before exam. - Infants, breast-fed: may have breast milk up to 2 hours before exam. - Infants, formula: may have bottle until 4 hours before exam.  Please call the Imaging Department at your exam site with any questions.            Jun 07, 2017 12:35 PM CDT   (Arrive by 12:20 PM)   Return Visit with Wes Harris MD   [facility] ([facility])    [address] [place] [address]  [address] [facility] [address] [phone]              Future tests that were ordered for you today     Open Future Orders        Priority Expected Expires Ordered    US Abdomen Complete Routine 8/7/2017 5/9/2018 5/9/2017            Who to contact     Please call your clinic at  [phone] to:    Ask questions about your health    Make or cancel appointments    Discuss your medicines    Learn about your test results    Speak to your doctor   If you have compliments or concerns about an experience at your clinic, or if you wish to file a complaint, please contact [facility] Patient Relations at [phone] or email us at [email or web address]         Additional Information About Your Visit        Intelliworkshart Information     [employer] gives you secure access to your electronic health record. If you see a primary care provider, you can also send messages to your care team and make appointments. If you have questions, please call your primary care clinic.  If you do not have a primary care provider, please call [phone] and they will assist you.      [employer] is an electronic gateway that provides easy, online access to your medical records. With [employer], you can request a clinic appointment, read your test results, renew a prescription or communicate with your care team.     To access your existing account, please contact your [facility] or call [phone] for assistance.        Care EveryWhere ID     This is your Care EveryWhere ID. This could be used by other organizations to access your [facility] medical records  [id number]         Blood Pressure from Last 3 Encounters:   05/04/17 130/80   04/26/17 154/90   04/04/17 (!) 152/93    Weight from Last 3 Encounters:   05/04/17 93 kg (205 lb)   04/26/17 93 kg (205 lb)   04/04/17 93.4 kg (206 lb)              Today, you had the following     No orders found for display         Where to get your medicines      These medications were sent to [place] [address] [facility] - [address]  [address] [address]     Phone:  [phone]     olopatadine 0.1 % ophthalmic solution          Primary Care Provider Office Phone # Fax #    Wes Harris [phone]  [phone]       [facility] [address] [facility] [address] [facility] [address]        Thank you!     Thank you for choosing EYE CLINIC  for your care. Our goal is always to provide you with excellent care. Hearing back from our patients is one way we can continue to improve our services. Please take a few minutes to complete the written survey that you may receive in the mail after your visit with us. Thank you!             Your Updated Medication List - Protect others around you: Learn how to safely use, store and throw away your medicines at www.disposemymeds.org.          This list is accurate as of: 5/10/17 11:55 AM.  Always use your most recent med list.                   Brand Name Dispense Instructions for use    aspirin 81 MG tablet      Take 1 tablet by mouth daily.       FISH OIL PO      Take 1 g by mouth daily       GINSENG PO      Take by mouth daily       lidocaine 1% with EPINEPHrine 1:100,000 1 %-1:552695 injection     3 mL    Inject 3 mLs into the skin once for 1 dose       losartan 25 MG tablet    COZAAR    90 tablet    Take 1 tablet (25 mg) by mouth daily       metoprolol 25 MG 24 hr tablet    TOPROL XL    90 tablet    Take 1 tablet (25 mg) by mouth daily       MULTIVITAMINS PO      Take 1 tablet by mouth daily.       nicotine polacrilex 2 MG gum    NICORETTE    440 each    Place 1 each (2 mg) inside cheek as needed for smoking cessation *CHEW APPROX. 20 PCS PER DAY AS DIRECTED       olopatadine 0.1 % ophthalmic solution    PATANOL    1 Bottle    Place 1 drop into both eyes 2 times daily       psyllium 58.6 % Powd    METAMUCIL     Take 2 teaspoonful by mouth daily       sildenafil 100 MG cap/tab    REVATIO/VIAGRA    8 tablet    Take 0.5-1 tablets ( mg) by mouth daily as needed for erectile dysfunction

## 2017-05-10 NOTE — PROGRESS NOTES
NAYELY Valencia is a 61 year old male here for full eye exam. He feels his vision is overall good and stable in both eyes at near and distance. He uses OTC readers for near vision. No eye pain, redness, discharge. No flashes/floaters. He does have bilateral eye itching associated with seasonal allergies. He uses olopatadine BID for this during allergy season. This works relatively well for him.     Assessment & Plan    (R73.03) Borderline type 2 diabetes mellitus  (primary encounter diagnosis)  Comment: He has been followed for this since 2012. Diet-controlled. No diabetic retinopathy.  Plan: Discussed the importance of tight blood glucose control in the prevention of diabetic retinopathy. Recommend yearly dilated eye exam.    (H10.13) Allergic conjunctivitis, bilateral  Comment: Seasonal  Plan: Pataday refilled    (H52.4) Presbyopia OU  Comment: Good distance vision without correction  Plan: Ok to continue with OTC readers      -----------------------------------------------------------------------------------    Patient disposition:   Return in about 1 year (around 5/10/2018). or sooner as needed.    Teaching statement:  Complete documentation of historical and exam elements from today's encounter can be found in the full encounter summary report (not reduplicated in this progress note). I personally obtained the chief complaint(s) and history of present illness.  I confirmed and edited as necessary the review of systems, past medical/surgical history, family history, social history, and examination findings as documented by others; and I examined the patient myself. I personally reviewed the relevant tests, images, and reports as documented above.     I formulated and edited as necessary the assessment and plan and discussed the findings and management plan with the patient and family.    Maria Teresa Cabrera MD  Comprehensive Ophthalmology & Ocular Pathology  Department of Ophthalmology and Visual  [email or web address]  Pager [phone]

## 2017-05-10 NOTE — NURSING NOTE
Chief Complaints and History of Present Illnesses   Patient presents with     Diabetic Eye Exam     s/p Allergic conjunctivitis, bilateral (Primary Dx)     HPI    Affected eye(s):  Both   Symptoms:     No blurred vision   Decreased vision   No distorted vision   No floaters   No flashes   No halos   No starbursts   Itching      Duration:  15 months   Frequency:  Constant       Do you have eye pain now?:  No      Comments:    States va is the same since last visit, near vision has decreased over the last 15 months OU.                      A1C                      6.1                 04/07/2017                 A1C                      6.3                 01/29/2016                 A1C                      6.0                 10/03/2014                 A1C                      6.6                 08/20/2014                 A1C                      5.9                 10/03/2012     BS: not checked daily.   José Miguel Kelly  10:14 AM May 10, 2017

## 2017-05-11 LAB — COPATH REPORT: NORMAL

## 2017-05-18 PROBLEM — D48.5 NEOPLASM OF UNCERTAIN BEHAVIOR OF SKIN: Status: ACTIVE | Noted: 2017-05-18

## 2017-05-19 ENCOUNTER — TELEPHONE (OUTPATIENT)
Dept: INTERNAL MEDICINE | Facility: CLINIC | Age: 62
End: 2017-05-19

## 2017-05-19 DIAGNOSIS — E88.89 STEATOSIS (H): Primary | ICD-10-CM

## 2017-05-19 NOTE — TELEPHONE ENCOUNTER
"Placed GI referral this encounter. CHICHO Harris    Dear Mr. Valencia;    Your abdominal ultrasound is stable except for \"hepatic steatosis\" This is usually benign; however, it is recommended you see the hepatologist for this at least once. I placed an order for this today and you can call [phone] to schedule this appointment. Moreover, I recommend you keep your 6/7/17 scheduled follow up with me.    CHICHO Harris MD     "

## 2017-06-07 ENCOUNTER — OFFICE VISIT (OUTPATIENT)
Dept: INTERNAL MEDICINE | Facility: CLINIC | Age: 62
End: 2017-06-07

## 2017-06-07 VITALS — SYSTOLIC BLOOD PRESSURE: 122 MMHG | HEART RATE: 84 BPM | DIASTOLIC BLOOD PRESSURE: 79 MMHG

## 2017-06-07 DIAGNOSIS — Z13.89 SKIN CONDITION SCREENING: Primary | ICD-10-CM

## 2017-06-07 ASSESSMENT — PAIN SCALES - GENERAL: PAINLEVEL: NO PAIN (0)

## 2017-06-07 NOTE — NURSING NOTE
Chief Complaint   Patient presents with     Results     Patient here to go over results.     Greta Beebe LPN at 12:32 PM on 6/7/2017.

## 2017-06-07 NOTE — PROGRESS NOTES
HPI:    Pt. Comes in  For follow up. He has had past detailed cardiac evaluation. He states he saw [facility] in [place] this past summer with normal resting echo. He is exercising more  Without problems. Around 7/14 he had elevated troponin and cor angiogram was found to be normal. Cardiac MRI showed decreased EF and possible myocarditis. He states URI sxs. But several months ago. He has old smoking history. He continues to use  EtOH; he does not think this is a problem; however, he brings this topic up at every visit.  He had normal exercise stress echo in 11/10. He is still due for colonoscopy.  He o/w denies additional HEENT, cardiopulmonary, abdominal, neurological, systemic complaints. He was seen by Dr. Rich, Cardiology 8/22/14 with repeat near normal resting cardiac echo. He still has elevated liver tests (from EtOH use?). He was seen by Dr. Choi, Health Psychology 2/5/16 and again 3/4/16. He had colonoscopy 5/17 repeat in 3 years.     PE:    Vitals noted gen nad cooperative alert, HEENT, ears normal oropharynx clear no exudate, neck supple nl rom no adenopathy, LCTA, B, normal resp. System exam, RRR, S1, S2, abdomen, nt, nd, no masses,  Neurological exam B UE/LE/proximal/distal is normal and symmetric for sensation, reflexes, and strength. Gait is normal.           A/P:    1. He has followed up with Dr. Rich 5/4/17 Cardiology and resting echo with near normal function.  2. He has been following with Dr. Choi Health Psychology for stress management. He was seen 2/5/16 and again 3/4/16  3.Fasting lipids from 1/29/16 with  and HDL 55 (EtOH?)  4. Colonoscopy 5/17 repeat in 3 years  PSA done 1/29/16.  5. A1C elevated; he will work on exercise/diet and follow. He was seen in DM education 3/1/16 A1C 6.3%  6. HTN; sent in Rx. For Losartan 25 mg PO QD. He states he had cough with Lisinopril. He is able to tolerate Losartan and BP still somewhat elevated and will increase to 50 mg PO QD.  He wants to start metoprolol due to erectile dysfunction.   7. Elevated liver tests returned to normal 4/17. Abdominal U/S 5/17 with hepatic steatosis. He had viral hepatology labs checked in the past. He has follow up with GENEVIEVE Meza 7/20/17. Would recommend EtOH reduction  8. Dermatology referral placed today        Total time spent 25 minutes.  More than 50% of the time spent with Mr. Valencia on counseling / coordinating his care

## 2017-06-07 NOTE — MR AVS SNAPSHOT
After Visit Summary   6/7/2017    Sujata Valencia    MRN: [id number]           Patient Information     Date Of Birth          1955        Visit Information        Provider Department      6/7/2017 12:35 PM Wes Harris MD [facility]         Follow-ups after your visit        Your next 10 appointments already scheduled     Jul 20, 2017  8:30 AM CDT   Lab with  LAB   [facility] ([facility])    [address] [facility] [address]  1st Floor  [facility] [id number] [phone]            Jul 20, 2017  9:30 AM CDT   (Arrive by 9:15 AM)   New General Liver with Akin Sandoval MD   [facility] ([facility])    [address]  3rd Floor  [facility] [id number] [phone]            Aug 16, 2017 10:35 AM CDT   (Arrive by 10:20 AM)   Return Visit with Wes Harris MD   [facility] ([facility])    [address]  4th Floor  [facility] [address] [phone]              Who to contact     Please call your clinic at [phone] to:    Ask questions about your health    Make or cancel appointments    Discuss your medicines    Learn about your test results    Speak to your doctor   If you have compliments or concerns about an experience at your clinic, or if you wish to file a complaint, please contact [facility] Patient Relations at [phone] or email us at [email or web address]         Additional Information About Your Visit        [employer] Information     [employer] gives you secure access to your electronic health record. If you see a primary care provider, you can also send messages to your care team and make appointments. If you have questions, please call your primary care clinic.  If you do not have a primary care provider, please call [phone] and they will assist you.      [employer] is an electronic gateway that  provides easy, online access to your medical records. With [employer], you can request a clinic appointment, read your test results, renew a prescription or communicate with your care team.     To access your existing account, please contact your [facility] or call [phone] for assistance.        Care EveryWhere ID     This is your Care EveryWhere ID. This could be used by other organizations to access your [facility] medical records  [id number]        Your Vitals Were     Pulse                   84            Blood Pressure from Last 3 Encounters:   06/07/17 122/79   05/04/17 130/80   04/26/17 154/90    Weight from Last 3 Encounters:   05/04/17 93 kg (205 lb)   04/26/17 93 kg (205 lb)   04/04/17 93.4 kg (206 lb)              Today, you had the following     No orders found for display       Primary Care Provider Office Phone # Fax #    Wes Harris [phone]       [address] [address]        Thank you!     Thank you for choosing [facility]  for your care. Our goal is always to provide you with excellent care. Hearing back from our patients is one way we can continue to improve our services. Please take a few minutes to complete the written survey that you may receive in the mail after your visit with us. Thank you!             Your Updated Medication List - Protect others around you: Learn how to safely use, store and throw away your medicines at www.disposemymeds.org.          This list is accurate as of: 6/7/17  1:17 PM.  Always use your most recent med list.                   Brand Name Dispense Instructions for use    aspirin 81 MG tablet      Take 1 tablet by mouth daily.       FISH OIL PO      Take 1 g by mouth daily       GINSENG PO      Take by mouth daily       losartan 25 MG tablet    COZAAR    90 tablet    Take 1 tablet (25 mg) by mouth daily       metoprolol 25 MG 24 hr tablet    TOPROL XL     90 tablet    Take 1 tablet (25 mg) by mouth daily       MULTIVITAMINS PO      Take 1 tablet by mouth daily.       nicotine polacrilex 2 MG gum    NICORETTE    440 each    Place 1 each (2 mg) inside cheek as needed for smoking cessation *CHEW APPROX. 20 PCS PER DAY AS DIRECTED       olopatadine 0.1 % ophthalmic solution    PATANOL    1 Bottle    Place 1 drop into both eyes 2 times daily       psyllium 58.6 % Powd    METAMUCIL     Take 2 teaspoonful by mouth daily       sildenafil 100 MG cap/tab    REVATIO/VIAGRA    8 tablet    Take 0.5-1 tablets ( mg) by mouth daily as needed for erectile dysfunction

## 2017-06-09 DIAGNOSIS — K76.0 STEATOSIS OF LIVER: Primary | ICD-10-CM

## 2017-07-20 ENCOUNTER — OFFICE VISIT (OUTPATIENT)
Dept: GASTROENTEROLOGY | Facility: CLINIC | Age: 62
End: 2017-07-20
Attending: INTERNAL MEDICINE
Payer: COMMERCIAL

## 2017-07-20 VITALS
WEIGHT: 207.8 LBS | OXYGEN SATURATION: 97 % | BODY MASS INDEX: 28.15 KG/M2 | HEIGHT: 72 IN | HEART RATE: 87 BPM | TEMPERATURE: 98.8 F | SYSTOLIC BLOOD PRESSURE: 142 MMHG | DIASTOLIC BLOOD PRESSURE: 80 MMHG

## 2017-07-20 DIAGNOSIS — K76.0 STEATOSIS OF LIVER: ICD-10-CM

## 2017-07-20 DIAGNOSIS — K76.0 STEATOSIS OF LIVER: Primary | ICD-10-CM

## 2017-07-20 LAB
ALBUMIN SERPL-MCNC: 4 G/DL (ref 3.4–5)
ALP SERPL-CCNC: 83 U/L (ref 40–150)
ALT SERPL W P-5'-P-CCNC: 46 U/L (ref 0–70)
ANION GAP SERPL CALCULATED.3IONS-SCNC: 6 MMOL/L (ref 3–14)
AST SERPL W P-5'-P-CCNC: 18 U/L (ref 0–45)
BILIRUB DIRECT SERPL-MCNC: 0.2 MG/DL (ref 0–0.2)
BILIRUB SERPL-MCNC: 0.7 MG/DL (ref 0.2–1.3)
BUN SERPL-MCNC: 12 MG/DL (ref 7–30)
CALCIUM SERPL-MCNC: 9.4 MG/DL (ref 8.5–10.1)
CHLORIDE SERPL-SCNC: 102 MMOL/L (ref 94–109)
CO2 SERPL-SCNC: 28 MMOL/L (ref 20–32)
CREAT SERPL-MCNC: 0.9 MG/DL (ref 0.66–1.25)
ERYTHROCYTE [DISTWIDTH] IN BLOOD BY AUTOMATED COUNT: 12 % (ref 10–15)
GFR SERPL CREATININE-BSD FRML MDRD: 85 ML/MIN/1.7M2
GLUCOSE SERPL-MCNC: 160 MG/DL (ref 70–99)
HCT VFR BLD AUTO: 47 % (ref 40–53)
HGB BLD-MCNC: 15.9 G/DL (ref 13.3–17.7)
INR PPP: 1.09 (ref 0.86–1.14)
MCH RBC QN AUTO: 30.3 PG (ref 26.5–33)
MCHC RBC AUTO-ENTMCNC: 33.8 G/DL (ref 31.5–36.5)
MCV RBC AUTO: 90 FL (ref 78–100)
PLATELET # BLD AUTO: 167 10E9/L (ref 150–450)
POTASSIUM SERPL-SCNC: 4.9 MMOL/L (ref 3.4–5.3)
PROT SERPL-MCNC: 7.8 G/DL (ref 6.8–8.8)
RBC # BLD AUTO: 5.24 10E12/L (ref 4.4–5.9)
SODIUM SERPL-SCNC: 136 MMOL/L (ref 133–144)
WBC # BLD AUTO: 6.5 10E9/L (ref 4–11)

## 2017-07-20 PROCEDURE — 99212 OFFICE O/P EST SF 10 MIN: CPT | Mod: ZF

## 2017-07-20 ASSESSMENT — PAIN SCALES - GENERAL: PAINLEVEL: NO PAIN (0)

## 2017-07-20 NOTE — PROGRESS NOTES
[facility]    Hepatology New Patient Visit    Referring provider:  Wes Harris      61 year old male with central adiposity, borderline diabetes, hyperlipidemia and steatosis on US who is being seen for fatty liver disease.    Chief complaint: discuss findings of fatty liver    HPI: Mr. Valencia is a 61 year old male originally from the [facility] region of [facility] (emigrated to [place] in [place]) with past history of central obesity, borderline diabetes, HTN, HLD who is seen today in consultation for evaluation of fatty liver disease. He has no complaints today. He specifically denies nausea, vomiting, diarrhea, constipation, increased abdominal girth, lower extremity swelling, confusion, itching or any form of GI bleeding. He has no complaints but would like to discuss the significance of fatty liver disease. His weight has been generally stable for some time. He states that he attempts to eat a balanced diet with predominantly lean protein and fruits/vegetables but it also sounds like he will have a fair amount of simple carbohydrates in the form of biscuits and other simple grains. He also regularly drinks alcohol. Prior to the past few months when the question of fatty liver disease was raised, he would have 3-4 drinks daily, and at times more. He states that he has cut back significantly since that time and now drinks Monday, Wednesday, Friday 2-3 drinks per day. He will drink throughout the weekend but does not quantify this use further. He does exercise when the weather permits (up to 5 miles per day) but will not leave the house when the weather is unfavorable.     Patient denies jaundice, abdominal distension, lower extremity edema, lethargy or confusion.    No history of melena, hematemesis or hematochezia.    Patient denies fevers, sweats, chills or weight loss.    Medical hx Surgical hx   Past Medical History:   Diagnosis Date     Arthritis      Back pains, lower      Chest  pain 7/28/2014     Hypertension      Myocarditis (H)      Other abnormal glucose 7/21/2011     Other and unspecified hyperlipidemia 7/21/2011     Subclinical hypothyroidism       Past Surgical History:   Procedure Laterality Date     COLONOSCOPY  1998    fistula      CORONARY ANGIOGRAPHY ADULT ORDER  07/29/2014    normal coronary arteries          Medications  Prior to Admission medications    Medication Sig Start Date End Date Taking? Authorizing Provider   olopatadine (PATANOL) 0.1 % ophthalmic solution Place 1 drop into both eyes 2 times daily 5/10/17  Yes Maria Teresa Cabrera MD   nicotine polacrilex (NICORETTE) 2 MG gum Place 1 each (2 mg) inside cheek as needed for smoking cessation *CHEW APPROX. 20 PCS PER DAY AS DIRECTED 4/6/17  Yes Wes Harris MD   losartan (COZAAR) 25 MG tablet Take 1 tablet (25 mg) by mouth daily 4/4/17  Yes Wes Harris MD   metoprolol (TOPROL XL) 25 MG 24 hr tablet Take 1 tablet (25 mg) by mouth daily 4/4/17  Yes Wes Harris MD   GINSENG PO Take by mouth daily   Yes Reported, Patient   sildenafil (VIAGRA) 100 MG tablet Take 0.5-1 tablets ( mg) by mouth daily as needed for erectile dysfunction 1/6/16  Yes Denny Khan MD   psyllium (METAMUCIL) 58.6 % POWD Take 2 teaspoonful by mouth daily   Yes Unknown, Entered By History   Multiple Vitamin (MULTIVITAMINS PO) Take 1 tablet by mouth daily.   Yes Reported, Patient   Omega-3 Fatty Acids (FISH OIL PO) Take 1 g by mouth daily    Yes Reported, Patient   aspirin 81 MG tablet Take 1 tablet by mouth daily.   Yes Reported, Patient       Allergies  Allergies   Allergen Reactions     Seasonal Allergies        Family hx Social hx   Family History   Problem Relation Age of Onset     OSTEOPOROSIS Mother      DIABETES Maternal Grandmother      Glaucoma No family hx of      Macular Degeneration No family hx of       Social History   Substance Use Topics     Smoking status: Former Smoker     Packs/day: 1.00     Years: 25.00  "    Quit date: 10/31/2001     Smokeless tobacco: Never Used                     Review of systems  A 10-point review of systems was negative.    Examination  /80  Pulse 87  Temp 98.8  F (37.1  C) (Oral)  Ht 1.816 m (5' 11.5\")  Wt 94.3 kg (207 lb 12.8 oz)  SpO2 97%  BMI 28.58 kg/m2  Body mass index is 28.58 kg/(m^2).    Gen- well, NAD, A+Ox3, normal color  Eye- EOMI  ENT- MMM, normal oropharynx  Lym- no palpable lymphadenopathy  CVS- S1, S2 normal, no added sounds, RRR  RS- CTA  Abd- obese, soft, NT, ND, BS+  Extr- pulses good, no MALIKA  MS- hands normal- no clubbing  Neuro- A+Ox3, no asterixis  Skin- no rash or jaundice  Psych- normal mood    Laboratory  Lab Results   Component Value Date     07/20/2017      Lab Results   Component Value Date    POTASSIUM 4.9 07/20/2017     Lab Results   Component Value Date    CHLORIDE 102 07/20/2017     Lab Results   Component Value Date    CO2 28 07/20/2017     Lab Results   Component Value Date    BUN 12 07/20/2017     Lab Results   Component Value Date    CR 0.90 07/20/2017       Lab Results   Component Value Date    WBC 6.5 07/20/2017     Lab Results   Component Value Date    HGB 15.9 07/20/2017     Lab Results   Component Value Date    HCT 47.0 07/20/2017     Lab Results   Component Value Date    MCV 90 07/20/2017     Lab Results   Component Value Date     07/20/2017       Lab Results   Component Value Date    AST 18 07/20/2017     Lab Results   Component Value Date    ALT 46 07/20/2017     No results found for: BILICONJ   Lab Results   Component Value Date    BILITOTAL 0.7 07/20/2017       Lab Results   Component Value Date    ALBUMIN 4.0 07/20/2017     Lab Results   Component Value Date    PROTTOTAL 7.8 07/20/2017      Lab Results   Component Value Date    ALKPHOS 83 07/20/2017       Lab Results   Component Value Date    INR 1.09 07/20/2017         Radiology reviewed US with findings of steatosis    Assessment  61 year old male with fatty liver " disease and evidence of metabolic syndrome who is seen in consultation for the same.    Plan  Spent a great deal of time today discussing overall prognosis with fatty liver disease. The combination of steatosis and findings c/w metabolic syndrome do put him at risk for progression to cirrhosis although the majority of consequences and adverse outcomes in fatty liver studies were from cardiac events.   We discussed the limited positive literature in the treatment of fatty liver disease and that 3 cups of regular coffee daily likely had some benefit in halting progression  He should also have an evaluation for sleep apnea if there are any concerns he may have this.  Modest EtOh may be of some benefit but he is almost certainly drinking to excess. We recommended no more than 2 drinks in a single setting with no more than 2 drinking days per week.    Follow-up as needed, further cares per PCP    Patient discussed and seen with Dr. Sandoval    The patient was seen and examined.  The above assessment and plan was developed jointly with the fellow.      Akin Sandoval MD      Professor of Medicine  [employer]      Executive Medical Director, Solid Organ Transplant Program  [facility]

## 2017-07-20 NOTE — MR AVS SNAPSHOT
After Visit Summary   7/20/2017    Sujata Valencia    MRN: [id number]           Patient Information     Date Of Birth          1955        Visit Information        Provider Department      7/20/2017 9:30 AM Nikko Harris MD [facility] Hepatology        Today's Diagnoses     Steatosis of liver    -  1       Follow-ups after your visit        Follow-up notes from your care team     Return if symptoms worsen or fail to improve.      Your next 10 appointments already scheduled     Oct 18, 2017 12:05 PM CDT   (Arrive by 11:50 AM)   Return Visit with Wes Harris MD   [facility] ([facility])    [address]  4th Floor  [facility] [address] [phone]              Who to contact     If you have questions or need follow up information about today's clinic visit or your schedule please contact [facility] HEPATOLOGY directly at [phone].  Normal or non-critical lab and imaging results will be communicated to you by MyChart, letter or phone within 4 business days after the clinic has received the results. If you do not hear from us within 7 days, please contact the clinic through MyChart or phone. If you have a critical or abnormal lab result, we will notify you by phone as soon as possible.  Submit refill requests through [facility] or call your pharmacy and they will forward the refill request to us. Please allow 3 business days for your refill to be completed.          Additional Information About Your Visit        MyChart Information     [facility] gives you secure access to your electronic health record. If you see a primary care provider, you can also send messages to your care team and make appointments. If you have questions, please call your primary care clinic.  If you do not have a primary care provider, please call [phone] and they will assist you.        Care EveryWhere ID     This is your Care EveryWhere ID. This could be used  "by other organizations to access your [employer] medical records  [id number]        Your Vitals Were     Pulse Temperature Height Pulse Oximetry BMI (Body Mass Index)       87 98.8  F (37.1  C) (Oral) 1.816 m (5' 11.5\") 97% 28.58 kg/m2        Blood Pressure from Last 3 Encounters:   08/16/17 125/75   07/20/17 142/80   06/07/17 122/79    Weight from Last 3 Encounters:   08/16/17 95.3 kg (210 lb)   07/20/17 94.3 kg (207 lb 12.8 oz)   05/04/17 93 kg (205 lb)              Today, you had the following     No orders found for display       Primary Care Provider Office Phone # Fax #    Wes Harris [phone] [address] [address]        Equal Access to Services     [facility]: Hadii corey ku hadasho Sojuliet, waaxda luqadaha, qaybta kaalmada adeegyada, larry dejesus hayantonion cat nye . So [facility] [phone].    ATENCIÓN: Si habla español, tiene a roberts disposición servicios gratuitos de asistencia lingüística. Malvin al [phone].    We comply with applicable federal civil rights laws and [place] laws. We do not discriminate on the basis of race, color, national origin, age, disability sex, sexual orientation or gender identity.            Thank you!     Thank you for choosing [facility] HEPATOLOGY  for your care. Our goal is always to provide you with excellent care. Hearing back from our patients is one way we can continue to improve our services. Please take a few minutes to complete the written survey that you may receive in the mail after your visit with us. Thank you!             Your Updated Medication List - Protect others around you: Learn how to safely use, store and throw away your medicines at www.disposemymeds.org.          This list is accurate as of: 7/20/17 11:59 PM.  Always use your most recent med list.                   Brand Name Dispense Instructions for use Diagnosis    aspirin 81 MG tablet      Take 1 tablet by mouth daily.        FISH OIL " PO      Take 1 g by mouth daily        GINSENG PO      Take by mouth daily    Stress, Increased glucose level       losartan 25 MG tablet    COZAAR    90 tablet    Take 1 tablet (25 mg) by mouth daily    Benign essential hypertension, Screen for colon cancer, Benign nodular prostatic hyperplasia without lower urinary tract symptoms, Skin lesion       metoprolol 25 MG 24 hr tablet    TOPROL XL    90 tablet    Take 1 tablet (25 mg) by mouth daily    Benign essential hypertension, Screen for colon cancer, Benign nodular prostatic hyperplasia without lower urinary tract symptoms, Skin lesion       MULTIVITAMINS PO      Take 1 tablet by mouth daily.        olopatadine 0.1 % ophthalmic solution    PATANOL    1 Bottle    Place 1 drop into both eyes 2 times daily    Allergic conjunctivitis, bilateral       psyllium 58.6 % Powd    METAMUCIL     Take 2 teaspoonful by mouth daily        sildenafil 100 MG cap/tab    REVATIO/VIAGRA    8 tablet    Take 0.5-1 tablets ( mg) by mouth daily as needed for erectile dysfunction    ED (erectile dysfunction)

## 2017-07-20 NOTE — NURSING NOTE
Chief Complaint   Patient presents with     Consult     Steatosis of Liver   Pt roomed, vitals, meds, and allergies reviewed with pt. Pt ready for provider.  Marlon Fernandes, CMA

## 2017-07-20 NOTE — LETTER
7/20/2017      RE: Sujata Valencia  [address] [place] [address]       [facility]    Hepatology New Patient Visit    Referring provider:  Wes Harris      61 year old male with central adiposity, borderline diabetes, hyperlipidemia and steatosis on US who is being seen for fatty liver disease.    Chief complaint: discuss findings of fatty liver    HPI: Mr. Valencia is a 61 year old male originally from the [facility] region of [facility] (emigrated to [place] in [place]) with past history of central obesity, borderline diabetes, HTN, HLD who is seen today in consultation for evaluation of fatty liver disease. He has no complaints today. He specifically denies nausea, vomiting, diarrhea, constipation, increased abdominal girth, lower extremity swelling, confusion, itching or any form of GI bleeding. He has no complaints but would like to discuss the significance of fatty liver disease. His weight has been generally stable for some time. He states that he attempts to eat a balanced diet with predominantly lean protein and fruits/vegetables but it also sounds like he will have a fair amount of simple carbohydrates in the form of biscuits and other simple grains. He also regularly drinks alcohol. Prior to the past few months when the question of fatty liver disease was raised, he would have 3-4 drinks daily, and at times more. He states that he has cut back significantly since that time and now drinks Monday, Wednesday, Friday 2-3 drinks per day. He will drink throughout the weekend but does not quantify this use further. He does exercise when the weather permits (up to 5 miles per day) but will not leave the house when the weather is unfavorable.     Patient denies jaundice, abdominal distension, lower extremity edema, lethargy or confusion.    No history of melena, hematemesis or hematochezia.    Patient denies fevers, sweats, chills or weight loss.    Medical hx Surgical hx   Past  Medical History:   Diagnosis Date     Arthritis      Back pains, lower      Chest pain 7/28/2014     Hypertension      Myocarditis (H)      Other abnormal glucose 7/21/2011     Other and unspecified hyperlipidemia 7/21/2011     Subclinical hypothyroidism       Past Surgical History:   Procedure Laterality Date     COLONOSCOPY  1998    fistula      CORONARY ANGIOGRAPHY ADULT ORDER  07/29/2014    normal coronary arteries          Medications  Prior to Admission medications    Medication Sig Start Date End Date Taking? Authorizing Provider   olopatadine (PATANOL) 0.1 % ophthalmic solution Place 1 drop into both eyes 2 times daily 5/10/17  Yes Maria Teresa Cabrera MD   nicotine polacrilex (NICORETTE) 2 MG gum Place 1 each (2 mg) inside cheek as needed for smoking cessation *CHEW APPROX. 20 PCS PER DAY AS DIRECTED 4/6/17  Yes Wes Harris MD   losartan (COZAAR) 25 MG tablet Take 1 tablet (25 mg) by mouth daily 4/4/17  Yes Wes Harris MD   metoprolol (TOPROL XL) 25 MG 24 hr tablet Take 1 tablet (25 mg) by mouth daily 4/4/17  Yes Wes Harris MD   GINSENG PO Take by mouth daily   Yes Reported, Patient   sildenafil (VIAGRA) 100 MG tablet Take 0.5-1 tablets ( mg) by mouth daily as needed for erectile dysfunction 1/6/16  Yes Denny Khan MD   psyllium (METAMUCIL) 58.6 % POWD Take 2 teaspoonful by mouth daily   Yes Unknown, Entered By History   Multiple Vitamin (MULTIVITAMINS PO) Take 1 tablet by mouth daily.   Yes Reported, Patient   Omega-3 Fatty Acids (FISH OIL PO) Take 1 g by mouth daily    Yes Reported, Patient   aspirin 81 MG tablet Take 1 tablet by mouth daily.   Yes Reported, Patient       Allergies  Allergies   Allergen Reactions     Seasonal Allergies        Family hx Social hx   Family History   Problem Relation Age of Onset     OSTEOPOROSIS Mother      DIABETES Maternal Grandmother      Glaucoma No family hx of      Macular Degeneration No family hx of       Social History   Substance  "Use Topics     Smoking status: Former Smoker     Packs/day: 1.00     Years: 25.00     Quit date: 10/31/2001     Smokeless tobacco: Never Used                     Review of systems  A 10-point review of systems was negative.    Examination  /80  Pulse 87  Temp 98.8  F (37.1  C) (Oral)  Ht 1.816 m (5' 11.5\")  Wt 94.3 kg (207 lb 12.8 oz)  SpO2 97%  BMI 28.58 kg/m2  Body mass index is 28.58 kg/(m^2).    Gen- well, NAD, A+Ox3, normal color  Eye- EOMI  ENT- MMM, normal oropharynx  Lym- no palpable lymphadenopathy  CVS- S1, S2 normal, no added sounds, RRR  RS- CTA  Abd- obese, soft, NT, ND, BS+  Extr- pulses good, no MALIKA  MS- hands normal- no clubbing  Neuro- A+Ox3, no asterixis  Skin- no rash or jaundice  Psych- normal mood    Laboratory  Lab Results   Component Value Date     07/20/2017      Lab Results   Component Value Date    POTASSIUM 4.9 07/20/2017     Lab Results   Component Value Date    CHLORIDE 102 07/20/2017     Lab Results   Component Value Date    CO2 28 07/20/2017     Lab Results   Component Value Date    BUN 12 07/20/2017     Lab Results   Component Value Date    CR 0.90 07/20/2017       Lab Results   Component Value Date    WBC 6.5 07/20/2017     Lab Results   Component Value Date    HGB 15.9 07/20/2017     Lab Results   Component Value Date    HCT 47.0 07/20/2017     Lab Results   Component Value Date    MCV 90 07/20/2017     Lab Results   Component Value Date     07/20/2017       Lab Results   Component Value Date    AST 18 07/20/2017     Lab Results   Component Value Date    ALT 46 07/20/2017     No results found for: BILICONJ   Lab Results   Component Value Date    BILITOTAL 0.7 07/20/2017       Lab Results   Component Value Date    ALBUMIN 4.0 07/20/2017     Lab Results   Component Value Date    PROTTOTAL 7.8 07/20/2017      Lab Results   Component Value Date    ALKPHOS 83 07/20/2017       Lab Results   Component Value Date    INR 1.09 07/20/2017         Radiology reviewed US " with findings of steatosis    Assessment  61 year old male with fatty liver disease and evidence of metabolic syndrome who is seen in consultation for the same.    Plan  Spent a great deal of time today discussing overall prognosis with fatty liver disease. The combination of steatosis and findings c/w metabolic syndrome do put him at risk for progression to cirrhosis although the majority of consequences and adverse outcomes in fatty liver studies were from cardiac events.   We discussed the limited positive literature in the treatment of fatty liver disease and that 3 cups of regular coffee daily likely had some benefit in halting progression  He should also have an evaluation for sleep apnea if there are any concerns he may have this.  Modest EtOh may be of some benefit but he is almost certainly drinking to excess. We recommended no more than 2 drinks in a single setting with no more than 2 drinking days per week.    Follow-up as needed, further cares per PCP    Patient discussed and seen with Dr. Sandoval    The patient was seen and examined.  The above assessment and plan was developed jointly with the fellow.      Akin Sandoval MD      Professor of Medicine  [employer]      Executive Medical Director, Solid Organ Transplant Program  [facility]

## 2017-07-20 NOTE — LETTER
7/20/2017       RE: Sujata Valencia  [address] [place] [address]     Dear Colleague,    Thank you for referring your patient, Sujata Valencia, to the [facility] HEPATOLOGY at [facility]. Please see a copy of my visit note below.    [facility] [place]    Hepatology New Patient Visit    Referring provider:  Wes Harris      61 year old male with central adiposity, borderline diabetes, hyperlipidemia and steatosis on US who is being seen for fatty liver disease.    Chief complaint: discuss findings of fatty liver    HPI: Mr. Valencia is a 61 year old male originally from the [facility] region of [place] (emigrated to [place] in [place]) with past history of central obesity, borderline diabetes, HTN, HLD who is seen today in consultation for evaluation of fatty liver disease. He has no complaints today. He specifically denies nausea, vomiting, diarrhea, constipation, increased abdominal girth, lower extremity swelling, confusion, itching or any form of GI bleeding. He has no complaints but would like to discuss the significance of fatty liver disease. His weight has been generally stable for some time. He states that he attempts to eat a balanced diet with predominantly lean protein and fruits/vegetables but it also sounds like he will have a fair amount of simple carbohydrates in the form of biscuits and other simple grains. He also regularly drinks alcohol. Prior to the past few months when the question of fatty liver disease was raised, he would have 3-4 drinks daily, and at times more. He states that he has cut back significantly since that time and now drinks Monday, Wednesday, Friday 2-3 drinks per day. He will drink throughout the weekend but does not quantify this use further. He does exercise when the weather permits (up to 5 miles per day) but will not leave the house when the weather is unfavorable.     Patient denies jaundice, abdominal  distension, lower extremity edema, lethargy or confusion.    No history of melena, hematemesis or hematochezia.    Patient denies fevers, sweats, chills or weight loss.    Medical hx Surgical hx   Past Medical History:   Diagnosis Date     Arthritis      Back pains, lower      Chest pain 7/28/2014     Hypertension      Myocarditis (H)      Other abnormal glucose 7/21/2011     Other and unspecified hyperlipidemia 7/21/2011     Subclinical hypothyroidism       Past Surgical History:   Procedure Laterality Date     COLONOSCOPY  1998    fistula      CORONARY ANGIOGRAPHY ADULT ORDER  07/29/2014    normal coronary arteries          Medications  Prior to Admission medications    Medication Sig Start Date End Date Taking? Authorizing Provider   olopatadine (PATANOL) 0.1 % ophthalmic solution Place 1 drop into both eyes 2 times daily 5/10/17  Yes Maria Teresa Cabrera MD   nicotine polacrilex (NICORETTE) 2 MG gum Place 1 each (2 mg) inside cheek as needed for smoking cessation *CHEW APPROX. 20 PCS PER DAY AS DIRECTED 4/6/17  Yes Wes Harris MD   losartan (COZAAR) 25 MG tablet Take 1 tablet (25 mg) by mouth daily 4/4/17  Yes Wes Harris MD   metoprolol (TOPROL XL) 25 MG 24 hr tablet Take 1 tablet (25 mg) by mouth daily 4/4/17  Yes Wes Harris MD   GINSENG PO Take by mouth daily   Yes Reported, Patient   sildenafil (VIAGRA) 100 MG tablet Take 0.5-1 tablets ( mg) by mouth daily as needed for erectile dysfunction 1/6/16  Yes Denny Khan MD   psyllium (METAMUCIL) 58.6 % POWD Take 2 teaspoonful by mouth daily   Yes Unknown, Entered By History   Multiple Vitamin (MULTIVITAMINS PO) Take 1 tablet by mouth daily.   Yes Reported, Patient   Omega-3 Fatty Acids (FISH OIL PO) Take 1 g by mouth daily    Yes Reported, Patient   aspirin 81 MG tablet Take 1 tablet by mouth daily.   Yes Reported, Patient       Allergies  Allergies   Allergen Reactions     Seasonal Allergies        Family hx Social hx   Family  "History   Problem Relation Age of Onset     OSTEOPOROSIS Mother      DIABETES Maternal Grandmother      Glaucoma No family hx of      Macular Degeneration No family hx of       Social History   Substance Use Topics     Smoking status: Former Smoker     Packs/day: 1.00     Years: 25.00     Quit date: 10/31/2001     Smokeless tobacco: Never Used                     Review of systems  A 10-point review of systems was negative.    Examination  /80  Pulse 87  Temp 98.8  F (37.1  C) (Oral)  Ht 1.816 m (5' 11.5\")  Wt 94.3 kg (207 lb 12.8 oz)  SpO2 97%  BMI 28.58 kg/m2  Body mass index is 28.58 kg/(m^2).    Gen- well, NAD, A+Ox3, normal color  Eye- EOMI  ENT- MMM, normal oropharynx  Lym- no palpable lymphadenopathy  CVS- S1, S2 normal, no added sounds, RRR  RS- CTA  Abd- obese, soft, NT, ND, BS+  Extr- pulses good, no MALIKA  MS- hands normal- no clubbing  Neuro- A+Ox3, no asterixis  Skin- no rash or jaundice  Psych- normal mood    Laboratory  Lab Results   Component Value Date     07/20/2017      Lab Results   Component Value Date    POTASSIUM 4.9 07/20/2017     Lab Results   Component Value Date    CHLORIDE 102 07/20/2017     Lab Results   Component Value Date    CO2 28 07/20/2017     Lab Results   Component Value Date    BUN 12 07/20/2017     Lab Results   Component Value Date    CR 0.90 07/20/2017       Lab Results   Component Value Date    WBC 6.5 07/20/2017     Lab Results   Component Value Date    HGB 15.9 07/20/2017     Lab Results   Component Value Date    HCT 47.0 07/20/2017     Lab Results   Component Value Date    MCV 90 07/20/2017     Lab Results   Component Value Date     07/20/2017       Lab Results   Component Value Date    AST 18 07/20/2017     Lab Results   Component Value Date    ALT 46 07/20/2017     No results found for: BILICONJ   Lab Results   Component Value Date    BILITOTAL 0.7 07/20/2017       Lab Results   Component Value Date    ALBUMIN 4.0 07/20/2017     Lab Results "   Component Value Date    PROTTOTAL 7.8 07/20/2017      Lab Results   Component Value Date    ALKPHOS 83 07/20/2017       Lab Results   Component Value Date    INR 1.09 07/20/2017         Radiology reviewed US with findings of steatosis    Assessment  61 year old male with fatty liver disease and evidence of metabolic syndrome who is seen in consultation for the same.    Plan  Spent a great deal of time today discussing overall prognosis with fatty liver disease. The combination of steatosis and findings c/w metabolic syndrome do put him at risk for progression to cirrhosis although the majority of consequences and adverse outcomes in fatty liver studies were from cardiac events.   We discussed the limited positive literature in the treatment of fatty liver disease and that 3 cups of regular coffee daily likely had some benefit in halting progression  He should also have an evaluation for sleep apnea if there are any concerns he may have this.  Modest EtOh may be of some benefit but he is almost certainly drinking to excess. We recommended no more than 2 drinks in a single setting with no more than 2 drinking days per week.    Follow-up as needed, further cares per PCP    Patient discussed and seen with Dr. Sandoval    The patient was seen and examined.  The above assessment and plan was developed jointly with the fellow.      Akin Sandoval MD      Professor of Medicine  [employer]      Executive Medical Director, Solid Organ Transplant Program  [facility]

## 2017-08-16 ENCOUNTER — OFFICE VISIT (OUTPATIENT)
Dept: INTERNAL MEDICINE | Facility: CLINIC | Age: 62
End: 2017-08-16

## 2017-08-16 VITALS
HEART RATE: 91 BPM | DIASTOLIC BLOOD PRESSURE: 75 MMHG | SYSTOLIC BLOOD PRESSURE: 125 MMHG | BODY MASS INDEX: 28.88 KG/M2 | RESPIRATION RATE: 16 BRPM | WEIGHT: 210 LBS

## 2017-08-16 DIAGNOSIS — R73.09 INCREASED GLUCOSE LEVEL: ICD-10-CM

## 2017-08-16 DIAGNOSIS — R73.09 INCREASED GLUCOSE LEVEL: Primary | ICD-10-CM

## 2017-08-16 LAB — HBA1C MFR BLD: 6.2 % (ref 4.3–6)

## 2017-08-16 ASSESSMENT — PAIN SCALES - GENERAL: PAINLEVEL: NO PAIN (0)

## 2017-08-16 ASSESSMENT — ENCOUNTER SYMPTOMS
SINUS CONGESTION: 0
FEVER: 0
DYSURIA: 0
FATIGUE: 0
ABDOMINAL PAIN: 0
WEAKNESS: 0
DIARRHEA: 0
WEIGHT LOSS: 0
HEMATURIA: 0
SHORTNESS OF BREATH: 0
COUGH: 0
LEG SWELLING: 0
NUMBNESS: 1
CONSTIPATION: 0
TINGLING: 1
SORE THROAT: 0
CHILLS: 0

## 2017-08-16 NOTE — PROGRESS NOTES
"Chief complaint:  Sujata Valencia is a 61 year old male presents for   Chief Complaint   Patient presents with     Health Maintenance     Here for follow up including health maintenance.         SUBJECTIVE:  Mr. Valencia is a 61 year old male wit a history of prediabetes, hepatic steatosis 2/2 to metabolic syndrome, and reduced EF of unknown etiology that is improving (most recent echo 4/7/17 with EF of 50%, normal coronary angiogram), who presents for follow up.     1. Prediabetes. His most recent Hgb A1c was 6.1 on 4/7/17. He has been controlling his sugars with lifestyle modifications. Currently he has a healthy diet and is walking 5 miles 5 times a week. He has noticed some burning and tingling in his toes, particularly on the right.   2. Alcohol use. Has decreased his alcohol use to 2 drinks every other day, around 8-10 drinks per week. He does admit he has a dependence on alcohol, but no abuse. He likes to drink alcohol in the evenings to help him sleep and for social reasons. He states that when he does not drink alcohol in the evening, he will take a melatonin OTC to help him fall asleep. But when he takes melatonin, he will have bad dreams and feels drowsy the next morning. He also states that his tongue feels \"coated\" when he does not have alcohol.   3. Hepatic steatosis. Saw GI recently and was told it was 2/2 to metabolic syndrome. Has been trying to maintain a healthy diet and exercise regularly.     Medications and allergies were reviewed and updated in the chart.     SocHx:   History   Smoking Status     Former Smoker     Packs/day: 1.00     Years: 25.00     Quit date: 10/31/2001   Smokeless Tobacco     Never Used       Family history and PMH reviewed and updated in chart    Patient Active Problem List    Diagnosis Date Noted     Neoplasm of uncertain behavior of skin 05/18/2017     Priority: Medium     Myocarditis (H)      Priority: Medium     Sibling relational problem 04/13/2016     Priority: Medium "     Adjustment disorder with anxiety 02/05/2016     Priority: Medium     Chest pain 07/28/2014     Priority: Medium     Subclinical hypothyroidism 09/14/2012     Priority: Medium     Abdominal pain 07/21/2011     Priority: Medium     Problem list name updated by automated process. Provider to review       Dry eye syndrome 07/21/2011     Priority: Medium     Presbyopia 07/21/2011     Priority: Medium     Hyperlipidemia 07/21/2011     Priority: Medium     Problem list name updated by automated process. Provider to review       Abnormal glucose 07/21/2011     Priority: Medium     Problem list name updated by automated process. Provider to review         Review of Systems     Constitutional:  Negative for fever, chills, weight loss and fatigue.   HENT:  Negative for sore throat and sinus congestion.    Eyes:  Negative for decreased vision.   Respiratory:   Negative for cough and shortness of breath.    Cardiovascular:  Negative for leg swelling and edema.   Gastrointestinal:  Negative for abdominal pain, diarrhea and constipation.   Genitourinary:  Negative for dysuria and hematuria.   Neurological:  Positive for tingling and numbness. Negative for weakness.       /75 (BP Location: Left arm, Patient Position: Chair, Cuff Size: Adult Regular)  Pulse 91  Resp 16  Wt 95.3 kg (210 lb)  BMI 28.88 kg/m2  Physical Exam   Constitutional: He is oriented to person, place, and time. He appears well-developed and well-nourished. No distress.   HENT:   Head: Normocephalic and atraumatic.   Eyes: EOM are normal. Pupils are equal, round, and reactive to light.   Neck: Normal range of motion.   Cardiovascular: Normal rate and regular rhythm.    No murmur heard.  Pulmonary/Chest: Effort normal. He has no wheezes. He has no rales.   Decreased lung sounds throughout   Abdominal: Soft. He exhibits no distension. There is no tenderness.   Obese   Musculoskeletal: Normal range of motion. He exhibits no edema or deformity.    Neurological: He is alert and oriented to person, place, and time.   Skin: Skin is warm and dry.   Foot exam: DP pulses 2+ bilaterally. No ulcers, lesions, or skin breakdown appreciated. Slightly decreased sensation with monofilament testing to the right foot throughout the entire plantar surface, greater in the toes, as well as the left big toe on the plantar surface. Otherwise normal sensation.     ASSESSMENT/PLAN:  Increased glucose level  Hgb A1c of 6.1 on 4/7/17. Currently lifestyle controlled. Potentially has peripheral neuropathy in his toes, but otherwise normal foot exam. Can consider an EMG in the future to confirm peripheral neuropathy. Will consider metformin or gabapentin after obtaining a Hgb A1c today.   - Hemoglobin A1c    Alcohol dependence  Has cut back to 2 drinks 4 days a week. Does report dependence to help with sleep. No signs of withdrawal. Will not start any z-drugs to help with sleep at this time.     Hepatic steatosis  Seen by Dr. Sandoval , on 7/20. Hepatic steatosis consistent with metabolic syndrome, normal hepatic panel. Will continue to monitor and continue lifestyle modifications.    Reduced EF  Normal coronary angiogram. Resting echo with EF of 50% on 4/7. Followed up with Dr. Rich on 5/4/17. Has future echocardiogram ordered.     Health Maintenance:  Deferred pneumovax and microalbumin until his next follow up - would like it at the same time as his influenza vaccine.    RTC: 2 months for follow up    Scribe Disclosure:   I, Saravanan Goodson, MS4 am serving as a scribe; to document services personally performed by Dr. Harris- -based on data collection and the provider's statements to me.     Provider Disclosure:  I agree with above History, Review of Systems, Physical exam and Plan.  I have reviewed the content of the documentation and have edited it as needed. I have personally performed the services documented here and the documentation accurately represents those services and the  decisions I have made.        Staff note; I personally interviewed pt. And conducted physical examination. I agree with above assessment and plan.    CHICHO Harrsi MD    Total time spent 25 minutes.  More than 50% of the time spent with Mr. Valencia on counseling / coordinating his care

## 2017-08-16 NOTE — PATIENT INSTRUCTIONS
[facility] Medication Refill Request Information:  * Please contact your pharmacy regarding ANY request for medication refills.  ** [facility] Prescription Fax = [phone]  * Please allow 3 business days for routine medication refills.  * Please allow 5 business days for controlled substance medication refills.     [facility] Test Result notification information:  *You will be notified with in 7-10 days of your appointment day regarding the results of your test.  If you are on MyChart you will be notified as soon as the provider has reviewed the results and signed off on them.    [facility] [phone]

## 2017-08-16 NOTE — NURSING NOTE
Chief Complaint   Patient presents with     Health Maintenance     Here for follow up including health maintenance.      Chang Hall CMA ([facility]) at 10:42 AM on 8/16/2017

## 2017-08-17 DIAGNOSIS — F17.200 SMOKING: ICD-10-CM

## 2017-08-17 NOTE — TELEPHONE ENCOUNTER
Pt calling in to request a refill of his nicotine polacrilex (NICORETTE) 2 MG gum. He states he is completely out and that it is very urgent that he have this refilled. He states he called his pharmacy ([facility] [address]) and they told him it would be quicker to call provider's office.   Lastly, pt requests Kimberly would put in a yearly prescription, so that pt does not have to call back for a refill every month. If this is possible, please contact pt.     RX sent.  Kaycee Tomas RN 3:06 PM on 8/17/2017.

## 2017-10-18 ENCOUNTER — OFFICE VISIT (OUTPATIENT)
Dept: INTERNAL MEDICINE | Facility: CLINIC | Age: 62
End: 2017-10-18

## 2017-10-18 VITALS
SYSTOLIC BLOOD PRESSURE: 144 MMHG | WEIGHT: 209.9 LBS | BODY MASS INDEX: 28.87 KG/M2 | DIASTOLIC BLOOD PRESSURE: 89 MMHG | HEART RATE: 101 BPM

## 2017-10-18 DIAGNOSIS — Z23 NEED FOR PROPHYLACTIC VACCINATION AND INOCULATION AGAINST INFLUENZA: Primary | ICD-10-CM

## 2017-10-18 DIAGNOSIS — K64.4 EXTERNAL HEMORRHOIDS: ICD-10-CM

## 2017-10-18 ASSESSMENT — ENCOUNTER SYMPTOMS
DYSPNEA ON EXERTION: 0
COUGH: 0
POSTURAL DYSPNEA: 0
NUMBNESS: 0
NAUSEA: 0
DIZZINESS: 0
DIARRHEA: 0
WEIGHT LOSS: 0
EXERCISE INTOLERANCE: 0
SINUS CONGESTION: 0
FEVER: 0
HEMATURIA: 0
RECTAL PAIN: 0
ABDOMINAL PAIN: 0
HEADACHES: 0
LEG SWELLING: 0
LEG PAIN: 0
VOMITING: 0
TINGLING: 0
FATIGUE: 0
DYSURIA: 0
SHORTNESS OF BREATH: 0
RECTAL BLEEDING: 1
SORE THROAT: 0
CHILLS: 0
LOSS OF CONSCIOUSNESS: 0

## 2017-10-18 ASSESSMENT — PAIN SCALES - GENERAL: PAINLEVEL: NO PAIN (0)

## 2017-10-18 NOTE — NURSING NOTE
Chief Complaint   Patient presents with     Results     Patient here to go over results.     Greta Beebe LPN at 12:16 PM on 10/18/2017.

## 2017-10-18 NOTE — PROGRESS NOTES
Chief complaint:  Sujata Valencia is a 61 year old male presents for follow up visit  Chief Complaint   Patient presents with     Results     Patient here to go over results.        SUBJECTIVE: 61 year old male who presents for follow up visit. He reports using diet to control prediabetes although his diet has not been as good lately given that he was traveling. He exercises by walking 4-5 miles each day. His other concern is hemorrhoids that developed about 10 days ago. He has been using an OTC cream which has helped with the symptoms. Denies any bleeding or pain at the moment. He is also concerned about erectile dysfunction and metoprolol. He is wondering about stopping the metoprolol or switching medications, his erectile dysfunction symptoms have been minimal lately and he takes viagra as needed. His last complaint is a tightness in his left shoulder the past couple days. It has been decreasing over the past few days. No shortness of breath, nausea, pain radiation. Pain is worse at night. He is also interested in vaccines today. Denies any other complaints.    Medications and allergies were reviewed and updated in the chart.     SocHx:   History   Smoking Status     Former Smoker     Packs/day: 1.00     Years: 25.00     Quit date: 10/31/2001   Smokeless Tobacco     Never Used       Family history and PMH reviewed and updated in chart    Patient Active Problem List    Diagnosis Date Noted     Neoplasm of uncertain behavior of skin 05/18/2017     Priority: Medium     Myocarditis (H)      Priority: Medium     Sibling relational problem 04/13/2016     Priority: Medium     Adjustment disorder with anxiety 02/05/2016     Priority: Medium     Chest pain 07/28/2014     Priority: Medium     Subclinical hypothyroidism 09/14/2012     Priority: Medium     Abdominal pain 07/21/2011     Priority: Medium     Problem list name updated by automated process. Provider to review       Dry eye syndrome 07/21/2011     Priority:  Medium     Presbyopia 07/21/2011     Priority: Medium     Hyperlipidemia 07/21/2011     Priority: Medium     Problem list name updated by automated process. Provider to review       Abnormal glucose 07/21/2011     Priority: Medium     Problem list name updated by automated process. Provider to review         Review of Systems     Constitutional:  Negative for fever, chills, weight loss and fatigue.   HENT:  Negative for sore throat and sinus congestion.    Respiratory:   Negative for cough, shortness of breath, dyspnea on exertion and postural dyspnea.    Cardiovascular:  Negative for chest pain, dyspnea on exertion, leg swelling, leg pain and exercise intolerance.   Gastrointestinal:  Positive for hemorrhoids and rectal bleeding. Negative for nausea, vomiting, abdominal pain, diarrhea and rectal pain.   Genitourinary:  Positive for male genitourinary complaint. Negative for dysuria and hematuria.   Skin:  Negative for rash.   Neurological:  Negative for dizziness, tingling, loss of consciousness, numbness and headaches.       /89  Pulse 101  Wt 95.2 kg (209 lb 14.4 oz)  BMI 28.87 kg/m2  Physical Exam   Constitutional: He is oriented to person, place, and time. He appears well-developed and well-nourished.   HENT:   Head: Normocephalic and atraumatic.   Cardiovascular: Normal rate, regular rhythm and intact distal pulses.  Exam reveals no gallop and no friction rub.    No murmur heard.  Pulmonary/Chest: Effort normal and breath sounds normal. No respiratory distress. He has no wheezes. He has no rales. He exhibits no tenderness.   Abdominal: Soft. There is no tenderness.   Musculoskeletal:   Minimal point tenderness superior left pec muscle. Shoulder with full range of motion without pain. Negative Neer and Curran.    Neurological: He is alert and oriented to person, place, and time.   Skin: Skin is warm and dry. No rash noted.   Psychiatric: He has a normal mood and affect.   Vitals  reviewed.      ASSESSMENT/PLAN: 61 year old male who presents for follow up visit and also has concerns of hemorrhoids, erectile dysfunction, and shoulder pain. Hemorrhoids have largely resolved and he will continue to use OTC cream as needed. We spoke about the metoprolol as a potential contributing factor to his ED. His blood pressure was elevated and his heart rate was slightly tachycardiac when documented but on physical exam his heart rate was within normal limits. We told him he could discontinue to metoprolol and see how his symptoms were and continue to monitor his blood pressure and follow up in 1-2 months. His shoulder exam is most consistent with pec muscle strain. No signs of rotator cuff pathology or osteoarthritis.     Need for prophylactic vaccination and inoculation against influenza  - FLU VACCINE, 3 YRS +, IM  [64342]    External hemorrhoids  - Use OTC cream as needed. If persist, see colorectal surgery  - COLORECTAL SURGERY REFERRAL    RTC: In 1-2 months    Scribe Disclosure:   I, Aditya Kruger, am serving as a scribe; to document services personally performed by Dr. Harris - -based on data collection and the provider's statements to me.     Provider Disclosure:  I agree with above History, Review of Systems, Physical exam and Plan.  I have reviewed the content of the documentation and have edited it as needed. I have personally performed the services documented here and the documentation accurately represents those services and the decisions I have made.      Staff note; I personally interviewed pt. And conducted physical examination. I agree with above assessment and plan.     CHICHO Harris

## 2017-10-18 NOTE — NURSING NOTE
"Injectable Influenza Immunization Documentation    1.  Has the patient received the information for the injectable influenza vaccine? YES     2. Is the patient 6 months of age or older? YES     3. Does the patient have any of the following contraindications?         Severe allergy to eggs? No     Severe allergic reaction to previous influenza vaccines? No   Severe allergy to latex? No       History of Guillain-Sabinal syndrome? No     Currently have a temperature greater than 100.4F? No        4.  Severely egg allergic patients should have flu vaccine eligibility assessed by an MD, RN, or pharmacist, and those who received flu vaccine should be observed for 15 min by an MD, RN, Pharmacist, Medical Technician, or member of clinic staff.\": YES    5. Latex-allergic patients should be given latex-free influenza vaccine N/A. Please reference the Vaccine latex table to determine if your clinic s product is latex-containing.       Vaccination given by Greta Beebe LPN at 1:56 PM on 10/18/2017.          "

## 2017-10-18 NOTE — MR AVS SNAPSHOT
After Visit Summary   10/18/2017    Sujata Valencia    MRN: [id number]           Patient Information     Date Of Birth          1955        Visit Information        Provider Department      10/18/2017 12:05 PM Wes Harris MD [facility]        Today's Diagnoses     Need for prophylactic vaccination and inoculation against influenza    -  1    External hemorrhoids           Follow-ups after your visit        Additional Services     COLORECTAL SURGERY REFERRAL       Hemorrhoids                  Your next 10 appointments already scheduled     Nov 10, 2017 10:05 AM CST   (Arrive by 9:50 AM)   Return Visit with Wes Harris MD   [facility] ([facility])    [address]  [address] [facility] [address] [phone]              Who to contact     Please call your clinic at [phone] to:    Ask questions about your health    Make or cancel appointments    Discuss your medicines    Learn about your test results    Speak to your doctor   If you have compliments or concerns about an experience at your clinic, or if you wish to file a complaint, please contact [facility] Patient Relations at [phone] or email us at [email or web address]         Additional Information About Your Visit        MyChart Information     TranslationEx[employer]anget gives you secure access to your electronic health record. If you see a primary care provider, you can also send messages to your care team and make appointments. If you have questions, please call your primary care clinic.  If you do not have a primary care provider, please call [phone] and they will assist you.      Cloudfind is an electronic gateway that provides easy, online access to your medical records. With Cloudfind, you can request a clinic appointment, read your test results, renew a prescription or communicate with your care team.     To access  your existing account, please contact your [facility] or call [phone] for assistance.        Care EveryWhere ID     This is your Care EveryWhere ID. This could be used by other organizations to access your [place] medical records  [id number]        Your Vitals Were     Pulse BMI (Body Mass Index)                101 28.87 kg/m2           Blood Pressure from Last 3 Encounters:   10/18/17 144/89   08/16/17 125/75   07/20/17 142/80    Weight from Last 3 Encounters:   10/18/17 95.2 kg (209 lb 14.4 oz)   08/16/17 95.3 kg (210 lb)   07/20/17 94.3 kg (207 lb 12.8 oz)              We Performed the Following     COLORECTAL SURGERY REFERRAL     FLU VACCINE, 3 YRS +, IM  [[id number]]        Primary Care Provider Office Phone # Fax #    Wes Harris [phone]       [address] [address]        Equal Access to Services     [facility] : Hadii aad ku hadasho Soomaali, waaxda luqadaha, qaybta kaalmada adeegyada, waxay idiin hayaan nguyễneg rosarioaramitzi nye . So [facility] [phone].    ATENCIÓN: Si habla español, tiene a roberts disposición servicios gratuitos de asistencia lingüística. Llame al [phone].    We comply with applicable federal civil rights laws and [place] laws. We do not discriminate on the basis of race, color, national origin, age, disability, sex, sexual orientation, or gender identity.            Thank you!     Thank you for choosing [facility]  for your care. Our goal is always to provide you with excellent care. Hearing back from our patients is one way we can continue to improve our services. Please take a few minutes to complete the written survey that you may receive in the mail after your visit with us. Thank you!             Your Updated Medication List - Protect others around you: Learn how to safely use, store and throw away your medicines at www.disposemymeds.org.          This list is accurate as of:  10/18/17  1:03 PM.  Always use your most recent med list.                   Brand Name Dispense Instructions for use Diagnosis    aspirin 81 MG tablet      Take 1 tablet by mouth daily.        FISH OIL PO      Take 1 g by mouth daily        GINSENG PO      Take by mouth daily    Stress, Increased glucose level       losartan 25 MG tablet    COZAAR    90 tablet    Take 1 tablet (25 mg) by mouth daily    Benign essential hypertension, Screen for colon cancer, Benign nodular prostatic hyperplasia without lower urinary tract symptoms, Skin lesion       metoprolol 25 MG 24 hr tablet    TOPROL XL    90 tablet    Take 1 tablet (25 mg) by mouth daily    Benign essential hypertension, Screen for colon cancer, Benign nodular prostatic hyperplasia without lower urinary tract symptoms, Skin lesion       MULTIVITAMINS PO      Take 1 tablet by mouth daily.        nicotine polacrilex 2 MG gum    NICORETTE    440 each    Place 1 each (2 mg) inside cheek as needed for smoking cessation *CHEW APPROX. 20 PCS PER DAY AS DIRECTED    Smoking       olopatadine 0.1 % ophthalmic solution    PATANOL    1 Bottle    Place 1 drop into both eyes 2 times daily    Allergic conjunctivitis, bilateral       psyllium 58.6 % Powd    METAMUCIL     Take 2 teaspoonful by mouth daily        sildenafil 100 MG tablet    VIAGRA    8 tablet    Take 0.5-1 tablets ( mg) by mouth daily as needed for erectile dysfunction    ED (erectile dysfunction)

## 2017-11-10 ENCOUNTER — OFFICE VISIT (OUTPATIENT)
Dept: INTERNAL MEDICINE | Facility: CLINIC | Age: 62
End: 2017-11-10

## 2017-11-10 VITALS
RESPIRATION RATE: 16 BRPM | DIASTOLIC BLOOD PRESSURE: 89 MMHG | BODY MASS INDEX: 28.87 KG/M2 | HEART RATE: 94 BPM | SYSTOLIC BLOOD PRESSURE: 137 MMHG | WEIGHT: 209.9 LBS

## 2017-11-10 DIAGNOSIS — I10 BENIGN ESSENTIAL HYPERTENSION: Primary | ICD-10-CM

## 2017-11-10 NOTE — MR AVS SNAPSHOT
After Visit Summary   11/10/2017    Sujata Valencia    MRN: [id number]           Patient Information     Date Of Birth          1955        Visit Information        Provider Department      11/10/2017 10:05 AM Wes Harris MD [facility]        Today's Diagnoses     Benign essential hypertension    -  1      Care Instructions    Primary Care Center Medication Refill Request Information:  * Please contact your pharmacy regarding ANY request for medication refills.  ** [facility] Prescription Fax = [phone]  * Please allow 3 business days for routine medication refills.  * Please allow 5 business days for controlled substance medication refills.     Primary Care Center Test Result notification information:  *You will be notified with in 7-10 days of your appointment day regarding the results of your test.  If you are on MyChart you will be notified as soon as the provider has reviewed the results and signed off on them.    [facility] [phone]             Follow-ups after your visit        Who to contact     Please call your clinic at [phone] to:    Ask questions about your health    Make or cancel appointments    Discuss your medicines    Learn about your test results    Speak to your doctor   If you have compliments or concerns about an experience at your clinic, or if you wish to file a complaint, please contact [facility] Patient Relations at [phone] or email us at [email or web address]         Additional Information About Your Visit        MyChart Information     [employer] Technologyhart gives you secure access to your electronic health record. If you see a primary care provider, you can also send messages to your care team and make appointments. If you have questions, please call your primary care clinic.  If you do not have a primary care provider, please call [phone] and they will assist you.      [employer] is an electronic  gateway that provides easy, online access to your medical records. With [employer]ISS[employer], you can request a clinic appointment, read your test results, renew a prescription or communicate with your care team.     To access your existing account, please contact your [facility] or call [phone] for assistance.        Care EveryWhere ID     This is your Care EveryWhere ID. This could be used by other organizations to access your [facility] medical records  [id number]        Your Vitals Were     Pulse Respirations BMI (Body Mass Index)             94 16 28.87 kg/m2          Blood Pressure from Last 3 Encounters:   11/10/17 137/89   10/18/17 144/89   08/16/17 125/75    Weight from Last 3 Encounters:   11/10/17 95.2 kg (209 lb 14.4 oz)   10/18/17 95.2 kg (209 lb 14.4 oz)   08/16/17 95.3 kg (210 lb)              Today, you had the following     No orders found for display       Primary Care Provider Office Phone # Fax #    Wes Harris [phone]       [address] [address]        Equal Access to Services     [facility]: Hadii aad ku hadasho Sokyleali, waaxda luqadaha, qaybta kaalmada adeeronyada, larry nye . So [facility] [phone].    ATENCIÓN: Si habla español, tiene a roberts disposición servicios gratuitos de asistencia lingüística. Llame al [phone].    We comply with applicable federal civil rights laws and [place] laws. We do not discriminate on the basis of race, color, national origin, age, disability, sex, sexual orientation, or gender identity.            Thank you!     Thank you for choosing [facility]  for your care. Our goal is always to provide you with excellent care. Hearing back from our patients is one way we can continue to improve our services. Please take a few minutes to complete the written survey that you may receive in the mail after your visit with us. Thank you!              Your Updated Medication List - Protect others around you: Learn how to safely use, store and throw away your medicines at www.disposemymeds.org.          This list is accurate as of: 11/10/17 12:07 PM.  Always use your most recent med list.                   Brand Name Dispense Instructions for use Diagnosis    aspirin 81 MG tablet      Take 1 tablet by mouth daily.        FISH OIL PO      Take 1 g by mouth daily        GINSENG PO      Take by mouth daily    Stress, Increased glucose level       losartan 25 MG tablet    COZAAR    90 tablet    Take 1 tablet (25 mg) by mouth daily    Benign essential hypertension, Screen for colon cancer, Benign nodular prostatic hyperplasia without lower urinary tract symptoms, Skin lesion       metoprolol 25 MG 24 hr tablet    TOPROL XL    90 tablet    Take 1 tablet (25 mg) by mouth daily    Benign essential hypertension, Screen for colon cancer, Benign nodular prostatic hyperplasia without lower urinary tract symptoms, Skin lesion       MULTIVITAMINS PO      Take 1 tablet by mouth daily.        nicotine polacrilex 2 MG gum    NICORETTE    440 each    Place 1 each (2 mg) inside cheek as needed for smoking cessation *CHEW APPROX. 20 PCS PER DAY AS DIRECTED    Smoking       olopatadine 0.1 % ophthalmic solution    PATANOL    1 Bottle    Place 1 drop into both eyes 2 times daily    Allergic conjunctivitis, bilateral       psyllium 58.6 % Powd    METAMUCIL     Take 2 teaspoonful by mouth daily        sildenafil 100 MG tablet    VIAGRA    8 tablet    Take 0.5-1 tablets ( mg) by mouth daily as needed for erectile dysfunction    ED (erectile dysfunction)

## 2017-11-10 NOTE — PROGRESS NOTES
"HPI:      Pt. Comes in for follow up today. Overall doing well. He stopped his Metoprolol (25 mg) and his erectile dysfunction complaints are less. He may more nocturnal palpitations since discontinuing.  No other HEENT, cardiopulmonary, abdominal, , neurological complaints. He specifically denies any rest/exertional CP/SOB      PE:    Vitals noted gen nad cooperative alert, HEENT, ears normal oropharynx clear no exudate, neck supple nl rom, LCTA, B, good air movement, RRR, S1, S2, abdomen exam is benign, grossly normal neurological exam.    A/P:    1. Myocarditis; seen in Cardiology by Dr. Rich 5/4/17 with slight decrease in EF; stable. Will send note to Dr. Rich regarding discontinuation of low dose Metoprolol due to erectile dysfunction  2. HTN; stable on low dose Losartan and could increase dose  3. Colonoscopy 5/17 repeat in 3 years  4. PSA done 4/17  5. He was seen in Dermatology 5/19/17  6. See in GI for \"fatty liver\" 7/17 and advised no EtOH.      Total time spent 25 minutes.  More than 50% of the time spent with Mr. Valencia on counseling / coordinating his care            "

## 2017-11-10 NOTE — PATIENT INSTRUCTIONS
[facility] Medication Refill Request Information:  * Please contact your pharmacy regarding ANY request for medication refills.  ** [facility] Prescription Fax = [phone]  * Please allow 3 business days for routine medication refills.  * Please allow 5 business days for controlled substance medication refills.     [facility] Test Result notification information:  *You will be notified with in 7-10 days of your appointment day regarding the results of your test.  If you are on MyChart you will be notified as soon as the provider has reviewed the results and signed off on them.    [facility] [phone]

## 2017-11-10 NOTE — NURSING NOTE
Chief Complaint   Patient presents with     Hypertension     Here for follow up including blood pressure     Chang Hall CMA (AAMA) at 10:17 AM on 11/10/2017

## 2017-11-28 ENCOUNTER — TELEPHONE (OUTPATIENT)
Dept: INTERNAL MEDICINE | Facility: CLINIC | Age: 62
End: 2017-11-28

## 2017-11-28 NOTE — TELEPHONE ENCOUNTER
----- Message from Alethea Ashley sent at 11/28/2017  2:55 PM CST -----  Regarding: requesting call back   Contact: [phone]  Pt had an appointment with Dr. Harris on 11/10/17. Dr. Harris said he was going to talk to a cardiologist regarding some medication and have you call to set up an appointment with him [place]. He is looking for an update about that. Pt is requesting a call back at [phone].

## 2017-11-30 NOTE — TELEPHONE ENCOUNTER
I sent a note to Dr. Rich, Cardiology and have not heard back yet. If he wants to stop Metoprolol (very low dose) and see how he feels and if this any any effect on his erectile dysfunction he can try this. Then follow up with me in a month or so.    CHICHO Harris    Left message for pt to call back.  Kaycee Tomas RN 8:05 AM on 11/30/2017.  Left message for pt to call,back.  Kaycee Tomas RN 1:45 PM on 12/4/2017.  Left message for pt to call back.  Kacyee Tomas RN 10:24 AM on 12/6/2017.

## 2017-12-11 DIAGNOSIS — H10.13 ALLERGIC CONJUNCTIVITIS, BILATERAL: ICD-10-CM

## 2017-12-11 RX ORDER — OLOPATADINE HYDROCHLORIDE 1 MG/ML
1 SOLUTION/ DROPS OPHTHALMIC 2 TIMES DAILY
Qty: 10 BOTTLE | Refills: 2 | Status: SHIPPED | OUTPATIENT
Start: 2017-12-11 | End: 2018-05-21

## 2017-12-11 NOTE — TELEPHONE ENCOUNTER
Last Written Prescription Date:  5/10/17  Last Fill Quantity: 1BOT,   # refills: 6  Last Office Visit : 5/10/17  Future Office visit:  NONE

## 2017-12-26 DIAGNOSIS — F17.200 SMOKING: ICD-10-CM

## 2017-12-27 DIAGNOSIS — F17.200 SMOKING: ICD-10-CM

## 2018-02-12 ENCOUNTER — MYC MEDICAL ADVICE (OUTPATIENT)
Dept: INTERNAL MEDICINE | Facility: CLINIC | Age: 63
End: 2018-02-12

## 2018-02-12 DIAGNOSIS — F17.200 SMOKING: ICD-10-CM

## 2018-02-12 NOTE — TELEPHONE ENCOUNTER
Refilled Nicorette; however, if not effective, I recommend he see Kyree in [facility] and we can place referral    CHICHO Harris

## 2018-03-20 DIAGNOSIS — L98.9 SKIN LESION: ICD-10-CM

## 2018-03-20 DIAGNOSIS — Z12.11 SCREEN FOR COLON CANCER: ICD-10-CM

## 2018-03-20 DIAGNOSIS — I10 BENIGN ESSENTIAL HYPERTENSION: ICD-10-CM

## 2018-03-20 DIAGNOSIS — N40.0 BENIGN NODULAR PROSTATIC HYPERPLASIA WITHOUT LOWER URINARY TRACT SYMPTOMS: ICD-10-CM

## 2018-03-21 RX ORDER — METOPROLOL SUCCINATE 25 MG/1
25 TABLET, EXTENDED RELEASE ORAL DAILY
Qty: 90 TABLET | Refills: 2 | Status: SHIPPED | OUTPATIENT
Start: 2018-03-21 | End: 2020-11-17

## 2018-05-09 ENCOUNTER — OFFICE VISIT (OUTPATIENT)
Dept: INTERNAL MEDICINE | Facility: CLINIC | Age: 63
End: 2018-05-09
Payer: COMMERCIAL

## 2018-05-09 VITALS
DIASTOLIC BLOOD PRESSURE: 81 MMHG | WEIGHT: 205.8 LBS | HEART RATE: 96 BPM | RESPIRATION RATE: 16 BRPM | BODY MASS INDEX: 28.81 KG/M2 | HEIGHT: 71 IN | SYSTOLIC BLOOD PRESSURE: 128 MMHG

## 2018-05-09 DIAGNOSIS — F17.200 SMOKING: ICD-10-CM

## 2018-05-09 DIAGNOSIS — L98.9 SKIN LESION: ICD-10-CM

## 2018-05-09 DIAGNOSIS — Z12.5 SCREENING FOR PROSTATE CANCER: Primary | ICD-10-CM

## 2018-05-09 DIAGNOSIS — N40.0 BENIGN NODULAR PROSTATIC HYPERPLASIA WITHOUT LOWER URINARY TRACT SYMPTOMS: ICD-10-CM

## 2018-05-09 DIAGNOSIS — I10 BENIGN ESSENTIAL HYPERTENSION: ICD-10-CM

## 2018-05-09 DIAGNOSIS — N52.01 ERECTILE DYSFUNCTION DUE TO ARTERIAL INSUFFICIENCY: ICD-10-CM

## 2018-05-09 DIAGNOSIS — Z12.11 SCREEN FOR COLON CANCER: ICD-10-CM

## 2018-05-09 RX ORDER — LOSARTAN POTASSIUM 25 MG/1
25 TABLET ORAL DAILY
Qty: 90 TABLET | Refills: 3 | Status: SHIPPED | OUTPATIENT
Start: 2018-05-09 | End: 2019-06-12

## 2018-05-09 RX ORDER — SILDENAFIL 100 MG/1
50-100 TABLET, FILM COATED ORAL DAILY PRN
Qty: 8 TABLET | Refills: 12 | Status: SHIPPED | OUTPATIENT
Start: 2018-05-09 | End: 2019-06-12

## 2018-05-09 ASSESSMENT — PAIN SCALES - GENERAL: PAINLEVEL: NO PAIN (0)

## 2018-05-09 NOTE — PATIENT INSTRUCTIONS
Primary Care Center Phone Number [phone]  Primary Care Center Medication Refill Request Information:  * Please contact your pharmacy regarding ANY request for medication refills.  ** [facility] Prescription Fax = [phone]  * Please allow 3 business days for routine medication refills.  * Please allow 5 business days for controlled substance medication refills.     Primary Care Center Test Result notification information:  *You will be notified with in 7-10 days of your appointment day regarding the results of your test.  If you are on MyChart you will be notified as soon as the provider has reviewed the results and signed off on them.

## 2018-05-09 NOTE — NURSING NOTE
Chief Complaint   Patient presents with     Physical     pt is here for an annual physical. Would like a1c, vitamin d, microalbumin, CMP, echo, and cardiology referral.      Refill Request     pt would like nicotine gum refilled year supply 4-6 boxes per month      Melina Ferraro CMA at 2:05 PM on 5/9/2018

## 2018-05-09 NOTE — MR AVS SNAPSHOT
After Visit Summary   5/9/2018    Sujata Valencia    MRN: [id number]           Patient Information     Date Of Birth          1955        Visit Information        Provider Department      5/9/2018 2:05 PM Wes Harris MD [facility]        Today's Diagnoses     Screening for prostate cancer    -  1    Erectile dysfunction due to arterial insufficiency        Benign essential hypertension        Screen for colon cancer        Benign nodular prostatic hyperplasia without lower urinary tract symptoms        Skin lesion        Smoking          Care Instructions    Primary Care Center Phone Number [phone]  Primary Care Center Medication Refill Request Information:  * Please contact your pharmacy regarding ANY request for medication refills.  ** [facility] Prescription Fax = [phone]  * Please allow 3 business days for routine medication refills.  * Please allow 5 business days for controlled substance medication refills.     Primary Care Center Test Result notification information:  *You will be notified with in 7-10 days of your appointment day regarding the results of your test.  If you are on MyChart you will be notified as soon as the provider has reviewed the results and signed off on them.                  Follow-ups after your visit        Additional Services     CARDIOLOGY EVAL ADULT REFERRAL       Wants to see Dr. Rich follow up echo                  Your next 10 appointments already scheduled     May 16, 2018  8:30 AM CDT   LAB with  LAB ONLY   [facility] ([facility])    [address] [place] [address] [phone]           Please do not eat 10-12 hours before your appointment if you are coming in fasting for labs on lipids, cholesterol, or glucose (sugar). This does not apply to pregnant women. Water, hot tea and black coffee (with nothing added) are okay. Do not drink other fluids, diet soda or chew gum.            May  21, 2018 10:15 AM CDT   RETURN GENERAL with Maria Teresa Cabrera MD   Eye Clinic ([facility])    [address]  9[address] Clin 9[address]  [facility] [place] [facility] [address]   [phone]              Future tests that were ordered for you today     Open Future Orders        Priority Expected Expires Ordered    Hemoglobin A1c Routine 5/9/2018 5/23/2018 5/9/2018    Echocardiogram Routine  5/9/2019 5/9/2018    CBC with platelets differential Routine 5/9/2018 5/23/2018 5/9/2018    Comprehensive metabolic panel Routine 5/9/2018 5/23/2018 5/9/2018    Vitamin D Deficiency Routine 5/9/2018 5/9/2019 5/9/2018    PSA screen Routine 5/9/2018 5/9/2019 5/9/2018    UA with Micro reflex to Culture Routine 5/9/2018 5/9/2019 5/9/2018    Lipid panel reflex to direct LDL Fasting Routine  5/9/2019 5/9/2018            Who to contact     Please call your clinic at [phone] to:    Ask questions about your health    Make or cancel appointments    Discuss your medicines    Learn about your test results    Speak to your doctor            Additional Information About Your Visit        Project Green Information     Project Green gives you secure access to your electronic health record. If you see a primary care provider, you can also send messages to your care team and make appointments. If you have questions, please call your primary care clinic.  If you do not have a primary care provider, please call [phone] and they will assist you.      Project Green is an electronic gateway that provides easy, online access to your medical records. With Project Green, you can request a clinic appointment, read your test results, renew a prescription or communicate with your care team.     To access your existing account, please contact your [facility] or call [phone] for assistance.        Care EveryWhere ID     This is your Care EveryWhere ID. This could be used by other organizations to access your [place]  "medical records  [id number]        Your Vitals Were     Pulse Respirations Height BMI (Body Mass Index)          96 16 1.803 m (5' 11\") 28.7 kg/m2         Blood Pressure from Last 3 Encounters:   05/09/18 128/81   11/10/17 137/89   10/18/17 144/89    Weight from Last 3 Encounters:   05/09/18 93.4 kg (205 lb 12.8 oz)   11/10/17 95.2 kg (209 lb 14.4 oz)   10/18/17 95.2 kg (209 lb 14.4 oz)              We Performed the Following     CARDIOLOGY EVAL ADULT REFERRAL          Today's Medication Changes          These changes are accurate as of 5/9/18  3:22 PM.  If you have any questions, ask your nurse or doctor.               These medicines have changed or have updated prescriptions.        Dose/Directions    sildenafil 100 MG tablet   Commonly known as:  VIAGRA   This may have changed:  reasons to take this   Used for:  Erectile dysfunction due to arterial insufficiency, Benign essential hypertension, Screen for colon cancer, Benign nodular prostatic hyperplasia without lower urinary tract symptoms, Skin lesion, Smoking, Screening for prostate cancer   Changed by:  Wes Harris MD        Dose:   mg   Take 0.5-1 tablets ( mg) by mouth daily as needed   Quantity:  8 tablet   Refills:  12            Where to get your medicines      These medications were sent to [facility] [id number] [facility] - [place], [place] - [address]  [address] [facility] [address]     Phone:  [phone]     losartan 25 MG tablet    nicotine polacrilex 2 MG gum    sildenafil 100 MG tablet                Primary Care Provider Office Phone # Fax #    Wes Harris [phone] [address] [address]        Equal Access to Services     [facility]: Hadii corey Camara, waaxda luqadaha, qaybta kaalmada adeegyada, larry bower. So [facility] [phone].    ATENCIÓN: Si habla español, tiene a roberts disposición servicios gratuitos de asistencia lingüística. Llame " al [phone].    We comply with applicable federal civil rights laws and [place] laws. We do not discriminate on the basis of race, color, national origin, age, disability, sex, sexual orientation, or gender identity.            Thank you!     Thank you for choosing [facility]  for your care. Our goal is always to provide you with excellent care. Hearing back from our patients is one way we can continue to improve our services. Please take a few minutes to complete the written survey that you may receive in the mail after your visit with us. Thank you!             Your Updated Medication List - Protect others around you: Learn how to safely use, store and throw away your medicines at www.disposemymeds.org.          This list is accurate as of 5/9/18  3:22 PM.  Always use your most recent med list.                   Brand Name Dispense Instructions for use Diagnosis    aspirin 81 MG tablet      Take 1 tablet by mouth daily.        FISH OIL PO      Take 1 g by mouth daily        GINSENG PO      Take by mouth daily    Stress, Increased glucose level       losartan 25 MG tablet    COZAAR    90 tablet    Take 1 tablet (25 mg) by mouth daily    Benign essential hypertension, Screen for colon cancer, Benign nodular prostatic hyperplasia without lower urinary tract symptoms, Skin lesion, Erectile dysfunction due to arterial insufficiency, Smoking, Screening for prostate cancer       metoprolol succinate 25 MG 24 hr tablet    TOPROL XL    90 tablet    Take 1 tablet (25 mg) by mouth daily    Benign essential hypertension, Screen for colon cancer, Benign nodular prostatic hyperplasia without lower urinary tract symptoms, Skin lesion       MULTIVITAMINS PO      Take 1 tablet by mouth daily.        nicotine polacrilex 2 MG gum    NICORETTE    880 each    Place 1 each (2 mg) inside cheek as needed for smoking cessation *CHEW APPROX. 20 PCS PER DAY AS DIRECTED    Smoking, Erectile dysfunction due to arterial  insufficiency, Benign essential hypertension, Screen for colon cancer, Benign nodular prostatic hyperplasia without lower urinary tract symptoms, Skin lesion, Screening for prostate cancer       olopatadine 0.1 % ophthalmic solution    PATANOL    10 Bottle    Place 1 drop into both eyes 2 times daily    Allergic conjunctivitis, bilateral       psyllium 58.6 % Powd    METAMUCIL     Take 2 teaspoonful by mouth daily        sildenafil 100 MG tablet    VIAGRA    8 tablet    Take 0.5-1 tablets ( mg) by mouth daily as needed    Erectile dysfunction due to arterial insufficiency, Benign essential hypertension, Screen for colon cancer, Benign nodular prostatic hyperplasia without lower urinary tract symptoms, Skin lesion, Smoking, Screening for prostate cancer

## 2018-05-09 NOTE — PROGRESS NOTES
"HPI:      Pt. Comes in for follow up today. Overall doing well. He stopped his Metoprolol (25 mg) and his erectile dysfunction complaints are less. He may more nocturnal palpitations since discontinuing.  No other HEENT, cardiopulmonary, abdominal, , neurological complaints. He specifically denies any rest/exertional CP/SOB.       PE:    Vitals noted gen nad cooperative alert, HEENT, ears normal oropharynx clear no exudate, neck supple nl rom, LCTA, B, good air movement, RRR, S1, S2, abdomen exam is benign, grossly normal neurological exam.    A/P:    1. Myocarditis; seen in Cardiology by  March 5/4/17 with slight decrease in EF; stable. Ordered repeat resting ultrasound and refer to Cardiology  2. HTN; stable on low dose Losartan and could increase dose; recommend he consider increasing to 50 mg PO QD  3. Colonoscopy 5/17 repeat in 3 years  4. PSA done 4/17; ordered repeat today  5. He was seen in Dermatology 5/19/17  6. See in GI for \"fatty liver\" 7/17 and advised no EtOH. Abdominal U/S done 5/2017 showing hepatic steatosis.   7. Check with insurance for new Zoster Vaccination.       Total time spent 25 minutes.  More than 50% of the time spent with Mr. Valencia on counseling / coordinating his care            "

## 2018-05-10 ENCOUNTER — TELEPHONE (OUTPATIENT)
Dept: INTERNAL MEDICINE | Facility: CLINIC | Age: 63
End: 2018-05-10

## 2018-05-10 NOTE — TELEPHONE ENCOUNTER
[facility] for Pt that Dr. Harris was not able to see the Pt's blood type and we could not routinely order the test. I reminded Pt that he can Google and purchase a test kit online for less than $10, which is actually cheaper than our lab's charge, or have it done at the [facility] in Aug.    ----- Message from Wes Harris MD sent at 5/10/2018  9:16 AM CDT -----  Regarding: RE: blood type inquiry  Thanks for the follow up    CHICHO Harris  ----- Message -----     From: Zohreh Toure     Sent: 5/10/2018   8:35 AM       To: Wes Harris MD  Subject: RE: blood type inquiry                           Thanks, Dr. Harris.    That's what I told him. I said they would only definitely check it if he was in the ER or hospitalized and surgery may be needed, which is why he mentioned his surgery in the 90s. I informed him that my records did not go back that far in Epic- that they were paper records then- and I couldn't find it listed in his [facility] chart.     I did inform him that if he ever goes to the [facility] that the [facility] has health booths inside, one of which does ABO blood typing, if he's interested. I also informed Pt that he could purchase a self-test blood type kit online.  Zohreh    ----- Message -----     From: Wes Harris MD     Sent: 5/9/2018   5:28 PM       To: Zohreh Toure  Subject: RE: blood type inquiry                           Dear Zohreh;    Usually we do not routinely check blood types    CHICHO Harris  ----- Message -----     From: Zohreh Toure     Sent: 5/9/2018   3:34 PM       To: Wes Harris MD  Subject: blood type inquiry                               Hi Dr. Harris,    At check-out, this Pt was inquiring what his ABO blood type is. I was not able to find it in Epic, but the online recs may not go back far enough. He stated he had surgery here in the 90s, and they would have definitely checked it then. If not, the Pt is requesting it to be  drawn.    Thanks,  Zohreh

## 2018-05-21 ENCOUNTER — OFFICE VISIT (OUTPATIENT)
Dept: OPHTHALMOLOGY | Facility: CLINIC | Age: 63
End: 2018-05-21
Attending: OPHTHALMOLOGY
Payer: COMMERCIAL

## 2018-05-21 DIAGNOSIS — R73.03 BORDERLINE TYPE 2 DIABETES MELLITUS: Primary | ICD-10-CM

## 2018-05-21 DIAGNOSIS — H52.4 PRESBYOPIA: ICD-10-CM

## 2018-05-21 DIAGNOSIS — H10.13 ALLERGIC CONJUNCTIVITIS, BILATERAL: ICD-10-CM

## 2018-05-21 PROCEDURE — G0463 HOSPITAL OUTPT CLINIC VISIT: HCPCS | Mod: ZF

## 2018-05-21 RX ORDER — OLOPATADINE HYDROCHLORIDE 1 MG/ML
1 SOLUTION/ DROPS OPHTHALMIC 2 TIMES DAILY
Qty: 1 BOTTLE | Refills: 11 | Status: SHIPPED | OUTPATIENT
Start: 2018-05-21 | End: 2019-06-11

## 2018-05-21 ASSESSMENT — CUP TO DISC RATIO
OS_RATIO: 0.2
OD_RATIO: 0.2

## 2018-05-21 ASSESSMENT — VISUAL ACUITY
METHOD: SNELLEN - LINEAR
OD_SC: 20/20
CORRECTION_TYPE: GLASSES
OS_SC: 20/20

## 2018-05-21 ASSESSMENT — CONF VISUAL FIELD
OD_NORMAL: 1
OS_NORMAL: 1

## 2018-05-21 ASSESSMENT — SLIT LAMP EXAM - LIDS
COMMENTS: NORMAL
COMMENTS: NORMAL

## 2018-05-21 ASSESSMENT — TONOMETRY
IOP_METHOD: TONOPEN
OD_IOP_MMHG: 21
OS_IOP_MMHG: 22

## 2018-05-21 ASSESSMENT — EXTERNAL EXAM - LEFT EYE: OS_EXAM: NORMAL

## 2018-05-21 ASSESSMENT — EXTERNAL EXAM - RIGHT EYE: OD_EXAM: NORMAL

## 2018-05-21 NOTE — PROGRESS NOTES
NAYELY Valencia is a 62 year old male here for full eye exam. He feels his vision is overall good and stable in both eyes at near and distance. He uses OTC readers for near vision. No eye pain, redness, discharge. No flashes/floaters. He have bilateral eye itching associated with seasonal allergies in spring and fall. He uses olopatadine BID for this during allergy season. This works relatively well for him.     Assessment & Plan    (R73.03) Borderline type 2 diabetes mellitus  (primary encounter diagnosis)  Comment: He has been followed for this since 2012. Diet-controlled. No diabetic retinopathy.  Plan: Discussed the importance of tight blood glucose control in the prevention of diabetic retinopathy. Recommend yearly dilated eye exam.    (H10.13) Allergic conjunctivitis, bilateral  Comment: Seasonal  Plan: Pataday refilled    (H52.4) Presbyopia OU  Comment: Good distance vision without correction  Plan: Ok to continue with OTC readers    -----------------------------------------------------------------------------------    Patient disposition:   Return in about 1 year (around 5/21/2019). or sooner as needed.    Teaching statement:  Complete documentation of historical and exam elements from today's encounter can be found in the full encounter summary report (not reduplicated in this progress note). I personally obtained the chief complaint(s) and history of present illness.  I confirmed and edited as necessary the review of systems, past medical/surgical history, family history, social history, and examination findings as documented by others; and I examined the patient myself. I personally reviewed the relevant tests, images, and reports as documented above.     I formulated and edited as necessary the assessment and plan and discussed the findings and management plan with the patient and family.    Maria Teresa Cabrera MD  Comprehensive Ophthalmology & Ocular Pathology  Department of Ophthalmology and  Visual Neurosciences  [email or web address]  Pager [phone]

## 2018-05-21 NOTE — MR AVS SNAPSHOT
After Visit Summary   5/21/2018    Sujata Valencia    MRN: [id number]           Patient Information     Date Of Birth          1955        Visit Information        Provider Department      5/21/2018 10:15 AM Maria Teresa Cabrera MD [facility]        Today's Diagnoses     Borderline type 2 diabetes mellitus    -  1    Allergic conjunctivitis, bilateral        Presbyopia           Follow-ups after your visit        Follow-up notes from your care team     Return in about 1 year (around 5/21/2019).      Your next 10 appointments already scheduled     Jun 08, 2018  9:00 AM CDT   LAB with  LAB ONLY   [facility] ([facility])    [address] [place] [address]   [phone]           Please do not eat 10-12 hours before your appointment if you are coming in fasting for labs on lipids, cholesterol, or glucose (sugar). This does not apply to pregnant women. Water, hot tea and black coffee (with nothing added) are okay. Do not drink other fluids, diet soda or chew gum.            Jl 15, 2018 11:35 AM CDT   (Arrive by 11:20 AM)   Return Visit with Wes Harris MD   [facility] ([facility])    [address] [place] [address] [phone]              Who to contact     Please call your clinic at [phone] to:    Ask questions about your health    Make or cancel appointments    Discuss your medicines    Learn about your test results    Speak to your doctor            Additional Information About Your Visit        [email or web address] Information     Blue Sourcet gives you secure access to your electronic health record. If you see a primary care provider, you can also send messages to your care team and make appointments. If you have questions, please call your primary care clinic.  If you do not have a primary care provider, please call [phone] and they will assist you.      Navut is an electronic  gateway that provides easy, online access to your medical records. With Civo, you can request a clinic appointment, read your test results, renew a prescription or communicate with your care team.     To access your existing account, please contact your [facility] or call [phone] for assistance.        Care EveryWhere ID     This is your Care EveryWhere ID. This could be used by other organizations to access your [place] medical records  [id number]         Blood Pressure from Last 3 Encounters:   05/09/18 128/81   11/10/17 137/89   10/18/17 144/89    Weight from Last 3 Encounters:   05/09/18 93.4 kg (205 lb 12.8 oz)   11/10/17 95.2 kg (209 lb 14.4 oz)   10/18/17 95.2 kg (209 lb 14.4 oz)              Today, you had the following     No orders found for display         Where to get your medicines      These medications were sent to [id number] - [place] - [address]  [address] [place] [address] [address]     Phone:  [phone]     olopatadine 0.1 % ophthalmic solution          Primary Care Provider Office Phone # Fax #    Wes LEY MD [place] [phone]       [address] [address]        Equal Access to Services     FRENCH MCELROY : Hadii aad ku hadasho Soomaali, waaxda luqadaha, qaybta kaalmada adeegyada, larry dejesus haysharee nye . So [facility] [phone].    ATENCIÓN: Si habla español, tiene a roberts disposición servicios gratuitos de asistencia lingüística. Llame al [phone].    We comply with applicable federal civil rights laws and [place] laws. We do not discriminate on the basis of race, color, national origin, age, disability, sex, sexual orientation, or gender identity.            Thank you!     Thank you for choosing EYE CLINIC  for your care. Our goal is always to provide you with excellent care. Hearing back from our patients is one way we can continue to improve our services. Please take a few minutes  to complete the written survey that you may receive in the mail after your visit with us. Thank you!             Your Updated Medication List - Protect others around you: Learn how to safely use, store and throw away your medicines at www.disposemymeds.org.          This list is accurate as of 5/21/18 11:24 AM.  Always use your most recent med list.                   Brand Name Dispense Instructions for use Diagnosis    aspirin 81 MG tablet      Take 1 tablet by mouth daily.        FISH OIL PO      Take 1 g by mouth daily        GINSENG PO      Take by mouth daily    Stress, Increased glucose level       losartan 25 MG tablet    COZAAR    90 tablet    Take 1 tablet (25 mg) by mouth daily    Benign essential hypertension, Screen for colon cancer, Benign nodular prostatic hyperplasia without lower urinary tract symptoms, Skin lesion, Erectile dysfunction due to arterial insufficiency, Smoking, Screening for prostate cancer       metoprolol succinate 25 MG 24 hr tablet    TOPROL XL    90 tablet    Take 1 tablet (25 mg) by mouth daily    Benign essential hypertension, Screen for colon cancer, Benign nodular prostatic hyperplasia without lower urinary tract symptoms, Skin lesion       MULTIVITAMINS PO      Take 1 tablet by mouth daily.        nicotine polacrilex 2 MG gum    NICORETTE    880 each    Place 1 each (2 mg) inside cheek as needed for smoking cessation *CHEW APPROX. 20 PCS PER DAY AS DIRECTED    Smoking, Erectile dysfunction due to arterial insufficiency, Benign essential hypertension, Screen for colon cancer, Benign nodular prostatic hyperplasia without lower urinary tract symptoms, Skin lesion, Screening for prostate cancer       olopatadine 0.1 % ophthalmic solution    PATANOL    1 Bottle    Place 1 drop into both eyes 2 times daily    Allergic conjunctivitis, bilateral       psyllium 58.6 % Powd    METAMUCIL     Take 2 teaspoonful by mouth daily        sildenafil 100 MG tablet    VIAGRA    8 tablet    Take  0.5-1 tablets ( mg) by mouth daily as needed    Erectile dysfunction due to arterial insufficiency, Benign essential hypertension, Screen for colon cancer, Benign nodular prostatic hyperplasia without lower urinary tract symptoms, Skin lesion, Smoking, Screening for prostate cancer

## 2018-05-21 NOTE — NURSING NOTE
Chief Complaints and History of Present Illnesses   Patient presents with     Eye Exam For Diabetes     HPI    Affected eye(s):  Both   Symptoms:        Frequency:  Constant       Do you have eye pain now?:  No      Comments:  States va is the same since last visit  Twitching in the YVONNE  +dry   +itchy with seasonal allergies   No F&F   BS does not check  Lab Results       Component                Value               Date                       A1C                      6.2                 08/16/2017                 A1C                      6.1                 04/07/2017                 A1C                      6.3                 01/29/2016                 A1C                      6.0                 10/03/2014                 A1C                      6.6                 08/20/2014            Sandra Licea COT 10:22 AM May 21, 2018

## 2018-05-28 NOTE — NURSING NOTE
Chief Complaint   Patient presents with     Results     Here to follow up on results     Chang Hall CMA at 12:57 PM on 4/26/2017      10

## 2018-06-08 ENCOUNTER — HOSPITAL ENCOUNTER (OUTPATIENT)
Dept: LAB | Facility: CLINIC | Age: 63
Discharge: HOME | End: 2018-06-08
Attending: INTERNAL MEDICINE | Admitting: INTERNAL MEDICINE
Payer: COMMERCIAL

## 2018-06-08 DIAGNOSIS — N40.0 BENIGN NODULAR PROSTATIC HYPERPLASIA WITHOUT LOWER URINARY TRACT SYMPTOMS: ICD-10-CM

## 2018-06-08 DIAGNOSIS — F17.200 SMOKING: ICD-10-CM

## 2018-06-08 DIAGNOSIS — L98.9 SKIN LESION: ICD-10-CM

## 2018-06-08 DIAGNOSIS — I10 BENIGN ESSENTIAL HYPERTENSION: ICD-10-CM

## 2018-06-08 DIAGNOSIS — Z12.5 SCREENING FOR PROSTATE CANCER: ICD-10-CM

## 2018-06-08 DIAGNOSIS — Z12.11 SCREEN FOR COLON CANCER: ICD-10-CM

## 2018-06-08 DIAGNOSIS — N52.01 ERECTILE DYSFUNCTION DUE TO ARTERIAL INSUFFICIENCY: ICD-10-CM

## 2018-06-08 LAB
ALBUMIN SERPL-MCNC: 3.8 G/DL (ref 3.4–5)
ALBUMIN UR-MCNC: 30 MG/DL
ALP SERPL-CCNC: 94 U/L (ref 40–150)
ALT SERPL W P-5'-P-CCNC: 126 U/L (ref 0–70)
ANION GAP SERPL CALCULATED.3IONS-SCNC: 11 MMOL/L (ref 3–14)
APPEARANCE UR: CLEAR
AST SERPL W P-5'-P-CCNC: 53 U/L (ref 0–45)
BASOPHILS # BLD AUTO: 0 10E9/L (ref 0–0.2)
BASOPHILS NFR BLD AUTO: 0.4 %
BILIRUB SERPL-MCNC: 0.7 MG/DL (ref 0.2–1.3)
BILIRUB UR QL STRIP: NEGATIVE
BUN SERPL-MCNC: 12 MG/DL (ref 7–30)
CALCIUM SERPL-MCNC: 8.8 MG/DL (ref 8.5–10.1)
CHLORIDE SERPL-SCNC: 103 MMOL/L (ref 94–109)
CHOLEST SERPL-MCNC: 175 MG/DL
CO2 SERPL-SCNC: 25 MMOL/L (ref 20–32)
COLOR UR AUTO: YELLOW
CREAT SERPL-MCNC: 0.76 MG/DL (ref 0.66–1.25)
DIFFERENTIAL METHOD BLD: NORMAL
EOSINOPHIL # BLD AUTO: 0.1 10E9/L (ref 0–0.7)
EOSINOPHIL NFR BLD AUTO: 1.6 %
ERYTHROCYTE [DISTWIDTH] IN BLOOD BY AUTOMATED COUNT: 12.1 % (ref 10–15)
GFR SERPL CREATININE-BSD FRML MDRD: >90 ML/MIN/1.7M2
GLUCOSE SERPL-MCNC: 276 MG/DL (ref 70–99)
GLUCOSE UR STRIP-MCNC: >1000 MG/DL
HBA1C MFR BLD: 10.2 % (ref 0–5.6)
HCT VFR BLD AUTO: 45.3 % (ref 40–53)
HDLC SERPL-MCNC: 51 MG/DL
HGB BLD-MCNC: 15.8 G/DL (ref 13.3–17.7)
HGB UR QL STRIP: NEGATIVE
IMM GRANULOCYTES # BLD: 0 10E9/L (ref 0–0.4)
IMM GRANULOCYTES NFR BLD: 0.5 %
KETONES UR STRIP-MCNC: NEGATIVE MG/DL
LDLC SERPL CALC-MCNC: 95 MG/DL
LEUKOCYTE ESTERASE UR QL STRIP: NEGATIVE
LYMPHOCYTES # BLD AUTO: 2 10E9/L (ref 0.8–5.3)
LYMPHOCYTES NFR BLD AUTO: 35.8 %
MCH RBC QN AUTO: 30.9 PG (ref 26.5–33)
MCHC RBC AUTO-ENTMCNC: 34.9 G/DL (ref 31.5–36.5)
MCV RBC AUTO: 89 FL (ref 78–100)
MONOCYTES # BLD AUTO: 0.5 10E9/L (ref 0–1.3)
MONOCYTES NFR BLD AUTO: 9.6 %
MUCOUS THREADS #/AREA URNS LPF: PRESENT /LPF
NEUTROPHILS # BLD AUTO: 2.9 10E9/L (ref 1.6–8.3)
NEUTROPHILS NFR BLD AUTO: 52.1 %
NITRATE UR QL: NEGATIVE
NONHDLC SERPL-MCNC: 124 MG/DL
PH UR STRIP: 6.5 PH (ref 5–7)
PLATELET # BLD AUTO: 151 10E9/L (ref 150–450)
POTASSIUM SERPL-SCNC: 4.2 MMOL/L (ref 3.4–5.3)
PROT SERPL-MCNC: 7.4 G/DL (ref 6.8–8.8)
PSA SERPL-ACNC: 0.3 UG/L (ref 0–4)
RBC # BLD AUTO: 5.12 10E12/L (ref 4.4–5.9)
RBC #/AREA URNS AUTO: <1 /HPF (ref 0–2)
SODIUM SERPL-SCNC: 139 MMOL/L (ref 133–144)
SOURCE: ABNORMAL
SP GR UR STRIP: 1.03 (ref 1–1.03)
TRIGL SERPL-MCNC: 146 MG/DL
TSH SERPL DL<=0.005 MIU/L-ACNC: 3.04 MU/L (ref 0.4–4)
UROBILINOGEN UR STRIP-MCNC: NORMAL MG/DL (ref 0–2)
WBC # BLD AUTO: 5.5 10E9/L (ref 4–11)
WBC #/AREA URNS AUTO: <1 /HPF (ref 0–5)

## 2018-06-08 PROCEDURE — 80061 LIPID PANEL: CPT | Performed by: INTERNAL MEDICINE

## 2018-06-08 PROCEDURE — 36415 COLL VENOUS BLD VENIPUNCTURE: CPT | Performed by: INTERNAL MEDICINE

## 2018-06-08 PROCEDURE — 83036 HEMOGLOBIN GLYCOSYLATED A1C: CPT | Performed by: INTERNAL MEDICINE

## 2018-06-08 PROCEDURE — 80053 COMPREHEN METABOLIC PANEL: CPT | Performed by: INTERNAL MEDICINE

## 2018-06-08 PROCEDURE — G0103 PSA SCREENING: HCPCS | Performed by: INTERNAL MEDICINE

## 2018-06-08 PROCEDURE — 81001 URINALYSIS AUTO W/SCOPE: CPT | Performed by: INTERNAL MEDICINE

## 2018-06-08 PROCEDURE — 85025 COMPLETE CBC W/AUTO DIFF WBC: CPT | Performed by: INTERNAL MEDICINE

## 2018-06-08 PROCEDURE — 84443 ASSAY THYROID STIM HORMONE: CPT | Performed by: INTERNAL MEDICINE

## 2018-06-08 PROCEDURE — 82306 VITAMIN D 25 HYDROXY: CPT | Performed by: INTERNAL MEDICINE

## 2018-06-12 LAB — DEPRECATED CALCIDIOL+CALCIFEROL SERPL-MC: 33 UG/L (ref 20–75)

## 2018-06-14 ENCOUNTER — TELEPHONE (OUTPATIENT)
Dept: CARDIOLOGY | Facility: CLINIC | Age: 63
End: 2018-06-14

## 2018-06-14 NOTE — TELEPHONE ENCOUNTER
Pt requested to have earlier appt with Dr. Rich then Sept 2018. [place] to make an appt with Dr. Rich. Explained this to Dr. Rich and she okay'd for pt to have DOD spot on 6/28 at 1:15pm. Called pt and transferred him to scheduling to have appts set up.

## 2018-06-15 ENCOUNTER — TELEPHONE (OUTPATIENT)
Dept: ENDOCRINOLOGY | Facility: CLINIC | Age: 63
End: 2018-06-15

## 2018-06-15 ENCOUNTER — OFFICE VISIT (OUTPATIENT)
Dept: INTERNAL MEDICINE | Facility: CLINIC | Age: 63
End: 2018-06-15
Payer: COMMERCIAL

## 2018-06-15 VITALS
SYSTOLIC BLOOD PRESSURE: 127 MMHG | DIASTOLIC BLOOD PRESSURE: 78 MMHG | BODY MASS INDEX: 27.67 KG/M2 | HEART RATE: 94 BPM | RESPIRATION RATE: 18 BRPM | WEIGHT: 198.4 LBS

## 2018-06-15 DIAGNOSIS — R74.8 ELEVATED LIVER ENZYMES: Primary | ICD-10-CM

## 2018-06-15 DIAGNOSIS — R73.9 HYPERGLYCEMIA: Primary | ICD-10-CM

## 2018-06-15 DIAGNOSIS — R73.09 INCREASED GLUCOSE LEVEL: ICD-10-CM

## 2018-06-15 RX ORDER — GLIMEPIRIDE 4 MG/1
4 TABLET ORAL
Qty: 30 TABLET | Refills: 1 | Status: SHIPPED | OUTPATIENT
Start: 2018-06-15 | End: 2018-06-20

## 2018-06-15 ASSESSMENT — PAIN SCALES - GENERAL: PAINLEVEL: MODERATE PAIN (5)

## 2018-06-15 NOTE — MR AVS SNAPSHOT
After Visit Summary   6/15/2018    Sujata Valencia    MRN: [id number]           Patient Information     Date Of Birth          1955        Visit Information        Provider Department      6/15/2018 11:35 AM Wes Harris MD [facility]        Today's Diagnoses     Elevated liver enzymes    -  1    Increased glucose level          Care Instructions    Primary Care Center Medication Refill Request Information:  * Please contact your pharmacy regarding ANY request for medication refills.  ** [facility] Prescription Fax = [phone]  * Please allow 3 business days for routine medication refills.  * Please allow 5 business days for controlled substance medication refills.     Primary Care Center Test Result notification information:  *You will be notified with in 7-10 days of your appointment day regarding the results of your test.  If you are on MyChart you will be notified as soon as the provider has reviewed the results and signed off on them.    Primary Care Center [phone]             Follow-ups after your visit        Additional Services     ENDOCRINOLOGY ADULT REFERRAL       Increased glucose                  Your next 10 appointments already scheduled     Jun 27, 2018 12:45 PM CDT   Ech Complete with [id number]   [facility] CV Echocardiography (Cardiovascular Imaging at [facility])    [address]  [address]  [facility] [address] [phone]           1. Please bring or wear a comfortable two-piece outfit. 2. You may eat, drink and take your normal medicines. 3. For any questions that cannot be answered, please contact the ordering physician            Jun 28, 2018  1:15 PM CDT   Return Visit with Danile Rich MD   [facility] ([facility])    [address]  [facility] [id number] [phone] OPT 2              Future tests that were ordered for you today     Open  Future Orders        Priority Expected Expires Ordered    F Actin EIA with reflex Routine  6/15/2019 6/15/2018    Anti Nuclear Mimi IgG by IFA with Reflex Routine  6/15/2019 6/15/2018    Hepatic panel Routine 6/15/2018 6/29/2018 6/15/2018    HCV Screen with Reflex Routine 6/15/2018 6/15/2019 6/15/2018    Hepatitis B core antibody Routine 6/15/2018 6/15/2019 6/15/2018    Hepatitis B Surface Antibody Routine 6/15/2018 6/15/2019 6/15/2018    Hepatitis B surface antigen Routine 6/15/2018 6/15/2019 6/15/2018    US Abdomen complete Routine  6/15/2019 6/15/2018            Who to contact     Please call your clinic at [phone] to:    Ask questions about your health    Make or cancel appointments    Discuss your medicines    Learn about your test results    Speak to your doctor            Additional Information About Your Visit        Target[employer] Information     [employer] gives you secure access to your electronic health record. If you see a primary care provider, you can also send messages to your care team and make appointments. If you have questions, please call your primary care clinic.  If you do not have a primary care provider, please call [phone] and they will assist you.      [employer]X is an electronic gateway that provides easy, online access to your medical records. With [employer], you can request a clinic appointment, read your test results, renew a prescription or communicate with your care team.     To access your existing account, please contact your [facility] or call [phone] for assistance.        Care EveryWhere ID     This is your Care EveryWhere ID. This could be used by other organizations to access your [place] medical records  [id number]        Your Vitals Were     Pulse Respirations BMI (Body Mass Index)             94 18 27.67 kg/m2          Blood Pressure from Last 3 Encounters:   06/15/18 127/78   05/09/18 128/81   11/10/17 137/89    Weight from Last 3  Encounters:   06/15/18 90 kg (198 lb 6.4 oz)   05/09/18 93.4 kg (205 lb 12.8 oz)   11/10/17 95.2 kg (209 lb 14.4 oz)              We Performed the Following     ENDOCRINOLOGY ADULT REFERRAL          Today's Medication Changes          These changes are accurate as of 6/15/18  2:37 PM.  If you have any questions, ask your nurse or doctor.               Start taking these medicines.        Dose/Directions    glimepiride 4 MG tablet   Commonly known as:  AMARYL   Used for:  Increased glucose level   Started by:  Wes Harris MD        Dose:  4 mg   Take 1 tablet (4 mg) by mouth every morning (before breakfast)   Quantity:  30 tablet   Refills:  1            Where to get your medicines      These medications were sent to [place] [address] [place] [facility] [place] - [address]  [address] [facility] [address]     Phone:  [phone]     glimepiride 4 MG tablet                Primary Care Provider Office Phone # Fax #    Wes Harris [phone]       [address] [address]        Equal Access to Services     [facility]: Hadii corey ku hadasho Soomaali, waaxda luqadaha, qaybta kaalmada adeegyada, larry nye . So [facility] [place] [phone].    ATENCIÓN: Si habla español, tiene a roberts disposición servicios gratuitos de asistencia lingüística. LlUniversity [facility]als [facility] [phone].    We comply with applicable federal civil rights laws and [place] laws. We do not discriminate on the basis of race, color, national origin, age, disability, sex, sexual orientation, or gender identity.            Thank you!     Thank you for choosing [facility]  for your care. Our goal is always to provide you with excellent care. Hearing back from our patients is one way we can continue to improve our services. Please take a few minutes to complete the written survey that you may receive in the mail after your visit with us. Thank you!             Your Updated Medication List  - Protect others around you: Learn how to safely use, store and throw away your medicines at www.disposemymeds.org.          This list is accurate as of 6/15/18  2:37 PM.  Always use your most recent med list.                   Brand Name Dispense Instructions for use Diagnosis    aspirin 81 MG tablet      Take 1 tablet by mouth daily.        FISH OIL PO      Take 1 g by mouth daily        GINSENG PO      Take by mouth daily    Stress, Increased glucose level       glimepiride 4 MG tablet    AMARYL    30 tablet    Take 1 tablet (4 mg) by mouth every morning (before breakfast)    Increased glucose level       losartan 25 MG tablet    COZAAR    90 tablet    Take 1 tablet (25 mg) by mouth daily    Benign essential hypertension, Screen for colon cancer, Benign nodular prostatic hyperplasia without lower urinary tract symptoms, Skin lesion, Erectile dysfunction due to arterial insufficiency, Smoking, Screening for prostate cancer       metoprolol succinate 25 MG 24 hr tablet    TOPROL XL    90 tablet    Take 1 tablet (25 mg) by mouth daily    Benign essential hypertension, Screen for colon cancer, Benign nodular prostatic hyperplasia without lower urinary tract symptoms, Skin lesion       MULTIVITAMINS PO      Take 1 tablet by mouth daily.        nicotine polacrilex 2 MG gum    NICORETTE    880 each    Place 1 each (2 mg) inside cheek as needed for smoking cessation *CHEW APPROX. 20 PCS PER DAY AS DIRECTED    Smoking, Erectile dysfunction due to arterial insufficiency, Benign essential hypertension, Screen for colon cancer, Benign nodular prostatic hyperplasia without lower urinary tract symptoms, Skin lesion, Screening for prostate cancer       olopatadine 0.1 % ophthalmic solution    PATANOL    1 Bottle    Place 1 drop into both eyes 2 times daily    Allergic conjunctivitis, bilateral       psyllium 58.6 % Powd    METAMUCIL     Take 2 teaspoonful by mouth daily        sildenafil 100 MG tablet    VIAGRA    8 tablet     Take 0.5-1 tablets ( mg) by mouth daily as needed    Erectile dysfunction due to arterial insufficiency, Benign essential hypertension, Screen for colon cancer, Benign nodular prostatic hyperplasia without lower urinary tract symptoms, Skin lesion, Smoking, Screening for prostate cancer

## 2018-06-15 NOTE — PROGRESS NOTES
"HPI:      Pt. Comes in for follow up today. Overall doing well. He stopped his Metoprolol (25 mg) and his erectile dysfunction complaints are less. He has wt. Loss and polyuria. No other HEENT, cardiopulmonary, abdominal, , neurological complaints. He specifically denies any rest/exertional CP/SOB.       PE:    Vitals noted gen nad cooperative alert, HEENT, ears normal oropharynx clear no exudate, neck supple nl rom, LCTA, B, good air movement, RRR, S1, S2, abdomen exam is benign, grossly normal neurological exam.    A/P:    1. Myocarditis; seen in Cardiology by Dr. Rich 5/4/17 with slight decrease in EF; stable. He has follow up next week 6/28/2018 with Dr. Rich  2. HTN; stable on low dose Losartan and could increase dose; recommend he consider increasing to 50 mg PO QD  3. Colonoscopy 5/17 repeat in 3 years  4. PSA done 4/7/2018  5. He was seen in Dermatology 5/19/17  6. See in GI for \"fatty liver\" 7/17 and advised no EtOH. Abdominal U/S done 5/2017 showing hepatic steatosis. Elevated liver tests and will repeat  7. Check with insurance for new Zoster Vaccination.   8. Elevated A1C. In 10 range. Will refer to Endocrinology. Discussed with Dr. Mg. Will start Glimepiride 4 mg PO QD and he will follow up next DM education.        Total time spent 25 minutes.  More than 50% of the time spent with Mr. Valencia on counseling / coordinating his care            "

## 2018-06-15 NOTE — NURSING NOTE
Chief Complaint   Patient presents with     Glucose     Patient is here for high glucose concerns.        Chang Hall CMA (AAMA) at 11:40 AM on 6/15/2018

## 2018-06-15 NOTE — PATIENT INSTRUCTIONS
[facility] Medication Refill Request Information:  * Please contact your pharmacy regarding ANY request for medication refills.  ** [facility] Prescription Fax = [phone]  * Please allow 3 business days for routine medication refills.  * Please allow 5 business days for controlled substance medication refills.     [facility] Test Result notification information:  *You will be notified with in 7-10 days of your appointment day regarding the results of your test.  If you are on MyChart you will be notified as soon as the provider has reviewed the results and signed off on them.    [facility] [phone]

## 2018-06-20 ENCOUNTER — TELEPHONE (OUTPATIENT)
Dept: INTERNAL MEDICINE | Facility: CLINIC | Age: 63
End: 2018-06-20

## 2018-06-20 DIAGNOSIS — R73.09 INCREASED GLUCOSE LEVEL: ICD-10-CM

## 2018-06-20 DIAGNOSIS — R74.8 ELEVATED LIVER ENZYMES: ICD-10-CM

## 2018-06-20 DIAGNOSIS — R94.5 ABNORMAL RESULTS OF LIVER FUNCTION STUDIES: Primary | ICD-10-CM

## 2018-06-20 LAB
ALBUMIN SERPL-MCNC: 4.2 G/DL (ref 3.4–5)
ALP SERPL-CCNC: 89 U/L (ref 40–150)
ALT SERPL W P-5'-P-CCNC: 91 U/L (ref 0–70)
ANION GAP SERPL CALCULATED.3IONS-SCNC: 12 MMOL/L (ref 3–14)
AST SERPL W P-5'-P-CCNC: 41 U/L (ref 0–45)
BILIRUB DIRECT SERPL-MCNC: 0.2 MG/DL (ref 0–0.2)
BILIRUB SERPL-MCNC: 0.7 MG/DL (ref 0.2–1.3)
BUN SERPL-MCNC: 13 MG/DL (ref 7–30)
CALCIUM SERPL-MCNC: 9.2 MG/DL (ref 8.5–10.1)
CHLORIDE SERPL-SCNC: 99 MMOL/L (ref 94–109)
CO2 SERPL-SCNC: 22 MMOL/L (ref 20–32)
CREAT SERPL-MCNC: 0.8 MG/DL (ref 0.66–1.25)
GFR SERPL CREATININE-BSD FRML MDRD: >90 ML/MIN/1.7M2
GLUCOSE SERPL-MCNC: 317 MG/DL (ref 70–99)
HBV CORE AB SERPL QL IA: NONREACTIVE
HBV SURFACE AB SERPL IA-ACNC: 0 M[IU]/ML
HBV SURFACE AG SERPL QL IA: NONREACTIVE
HCV AB SERPL QL IA: NONREACTIVE
POTASSIUM SERPL-SCNC: 4.2 MMOL/L (ref 3.4–5.3)
PROT SERPL-MCNC: 8 G/DL (ref 6.8–8.8)
SODIUM SERPL-SCNC: 133 MMOL/L (ref 133–144)

## 2018-06-20 RX ORDER — GLIMEPIRIDE 4 MG/1
4 TABLET ORAL
Qty: 60 TABLET | Refills: 1 | COMMUNITY
Start: 2018-06-20 | End: 2018-06-21

## 2018-06-20 NOTE — TELEPHONE ENCOUNTER
----- Message from Shruthi Mg MD sent at 6/20/2018  5:09 PM CDT -----  Thank you! You can increase him to glimepiride at 8 mg daily (max total dose) - I think he has room to go  ----- Message -----     From: Wes Harris MD     Sent: 6/20/2018   2:15 PM       To: Shruthi Mg MD    Dear Shruthi;    I talked to Mr. Valencia briefly today. No major complaints (just wt. Loss). He has been on glimepiride 4 mg for less than one week. Non-fasting glucose today 317. He has follow up with Irma 7/2/2018 for DM education  and Dr. Ceja later in 8/2018. Liver tests are lower today.     Just an update    Cedric VÁZQUEZ

## 2018-06-21 LAB — ANA SER QL IF: NEGATIVE

## 2018-06-21 RX ORDER — GLIMEPIRIDE 4 MG/1
4 TABLET ORAL
Qty: 60 TABLET | Refills: 1 | Status: SHIPPED | OUTPATIENT
Start: 2018-06-21 | End: 2018-08-01

## 2018-06-21 NOTE — TELEPHONE ENCOUNTER
Dear Kaycee;    Please see note attached from Dr. Mg Endocrine    Increased glimepiride to 4 mg BID (placed Rx. Historical this encounter)    Please give Mr. Valencia a call    ThanksCHICHO    Pt called and informed of increase his Glimepiride to bid.  Kaycee Tomas RN 10:37 AM on 6/21/2018.

## 2018-06-22 LAB — SMA IGG SER-ACNC: 10 UNITS (ref 0–19)

## 2018-06-27 ENCOUNTER — HOSPITAL ENCOUNTER (OUTPATIENT)
Dept: CARDIOLOGY | Facility: CLINIC | Age: 63
Discharge: HOME | End: 2018-06-27
Attending: INTERNAL MEDICINE | Admitting: INTERNAL MEDICINE
Payer: COMMERCIAL

## 2018-06-27 DIAGNOSIS — F17.200 SMOKING: ICD-10-CM

## 2018-06-27 DIAGNOSIS — N52.01 ERECTILE DYSFUNCTION DUE TO ARTERIAL INSUFFICIENCY: ICD-10-CM

## 2018-06-27 DIAGNOSIS — N40.0 BENIGN NODULAR PROSTATIC HYPERPLASIA WITHOUT LOWER URINARY TRACT SYMPTOMS: ICD-10-CM

## 2018-06-27 DIAGNOSIS — I10 BENIGN ESSENTIAL HYPERTENSION: ICD-10-CM

## 2018-06-27 DIAGNOSIS — I40.1 IDIOPATHIC MYOCARDITIS, UNSPECIFIED CHRONICITY: ICD-10-CM

## 2018-06-27 DIAGNOSIS — Z12.5 SCREENING FOR PROSTATE CANCER: ICD-10-CM

## 2018-06-27 DIAGNOSIS — Z12.11 SCREEN FOR COLON CANCER: ICD-10-CM

## 2018-06-27 DIAGNOSIS — L98.9 SKIN LESION: ICD-10-CM

## 2018-06-27 PROCEDURE — 25500064 ZZH RX 255 OP 636: Performed by: INTERNAL MEDICINE

## 2018-06-27 PROCEDURE — 93306 TTE W/DOPPLER COMPLETE: CPT | Mod: 26 | Performed by: INTERNAL MEDICINE

## 2018-06-27 PROCEDURE — 93306 TTE W/DOPPLER COMPLETE: CPT

## 2018-06-27 PROCEDURE — 40000264 ECHO COMPLETE WITH OPTISON

## 2018-06-27 RX ADMIN — HUMAN ALBUMIN MICROSPHERES AND PERFLUTREN 9 ML: 10; .22 INJECTION, SOLUTION INTRAVENOUS at 13:45

## 2018-06-28 ENCOUNTER — OFFICE VISIT (OUTPATIENT)
Dept: CARDIOLOGY | Facility: CLINIC | Age: 63
End: 2018-06-28
Payer: COMMERCIAL

## 2018-06-28 VITALS
WEIGHT: 200 LBS | BODY MASS INDEX: 28 KG/M2 | HEIGHT: 71 IN | DIASTOLIC BLOOD PRESSURE: 60 MMHG | HEART RATE: 64 BPM | SYSTOLIC BLOOD PRESSURE: 126 MMHG

## 2018-06-28 DIAGNOSIS — I40.1 IDIOPATHIC MYOCARDITIS, UNSPECIFIED CHRONICITY: ICD-10-CM

## 2018-06-28 PROCEDURE — 99213 OFFICE O/P EST LOW 20 MIN: CPT | Performed by: INTERNAL MEDICINE

## 2018-06-28 NOTE — LETTER
6/28/2018      Wes Harris MD  [address] [address]      RE: Sujata Valencia       Dear Colleague,    I had the pleasure of seeing Sujata Valencia in the [facility].    Service Date: 06/28/2018      HISTORY OF PRESENT ILLNESS:  Mr. Valencia is a pleasant 62-year-old gentleman who I last saw in 05/2017.  I met him in 2014 when he presented with chest pain and was found to have myocarditis.  On coronary angiogram at that time, he did not have any coronary artery disease.      Mr. Valencia has been doing well.  His ejection fraction has been very stable in the range of about 50% since diagnosis.  He has been on Cozaar and Toprol and tolerating them well.  He has not had any symptoms of chest pain or discomfort.  He is very active, walking and feels well with this activity.      Unfortunately, Mr. Valencia has just been diagnosed with diabetes.  He is now watching his alcohol intake more closely.  He was drinking 4 large alcoholic beverages a day.  He is also walking.  We discussed the alcohol could play a role in his chronic cardiomyopathy as well and that this may help with that, which he was glad to hear.  He is on a baby aspirin.  He is not interested in being on a statin.  His last cholesterol showed an HDL of 51 and LDL of 95.  His A1c was up to 10 with the diagnosis.  It sounds like it was pretty rapid onset.  He noted he had lost nearly 10 pounds in a month.      IMPRESSION, REPORT, PLAN:   1.  History of myocarditis with ejection fraction stable around 50%, unchanged compared to 2014.   2.  Hyperlipidemia, not on treatment, as he is not interested in being on a statin.   3.  Hypertension, well controlled.   4.  Diabetes, new diagnosis.   5.  History of alcohol abuse.      DISCUSSION:  It was a pleasure to see Mr. Valencia in followup.  Clinically, he is doing really well.  He has made some lifestyle changes now with the diagnosis of diabetes, which I was  really glad to see her.  I am hoping that he can remain primarily abstinent from alcohol, which may help with his cardiomyopathy.  He does not want to be on a statin, so it was not initiated in the setting of his diabetes.  He did not have significant coronary artery disease at the time of his cath, however, in .  We discussed followup in a year or sooner if there are any issues in the interim.  He understands and is in agreement with this plan as outlined.  All questions were answered.  It was a pleasure to see him.  Please do not hesitate to contact me with any questions or concerns.         LUCAS BUNCH MD             D: 2018   T: 2018   MT: KIRK      Name:     ZULEIKA REYES   MRN:      [id number]        Account:      [id number]   :      1955           Service Date: 2018      Document: [id number]         Outpatient Encounter Prescriptions as of 2018   Medication Sig Dispense Refill     aspirin 81 MG tablet Take 1 tablet by mouth daily.       GINSENG PO Take by mouth daily       glimepiride (AMARYL) 4 MG tablet Take 1 tablet (4 mg) by mouth 2 times daily 60 tablet 1     losartan (COZAAR) 25 MG tablet Take 1 tablet (25 mg) by mouth daily 90 tablet 3     Multiple Vitamin (MULTIVITAMINS PO) Take 1 tablet by mouth daily.       nicotine polacrilex (NICORETTE) 2 MG gum Place 1 each (2 mg) inside cheek as needed for smoking cessation *CHEW APPROX. 20 PCS PER DAY AS DIRECTED 880 each 11     olopatadine (PATANOL) 0.1 % ophthalmic solution Place 1 drop into both eyes 2 times daily 1 Bottle 11     Omega-3 Fatty Acids (FISH OIL PO) Take 1 g by mouth daily        psyllium (METAMUCIL) 58.6 % POWD Take 2 teaspoonful by mouth daily       sildenafil (VIAGRA) 100 MG tablet Take 0.5-1 tablets ( mg) by mouth daily as needed 8 tablet 12     metoprolol succinate (TOPROL XL) 25 MG 24 hr tablet Take 1 tablet (25 mg) by mouth daily (Patient not taking: Reported on 2018) 90 tablet 2      [DISCONTINUED] sodium chloride (PF) 0.9% PF flush 10 mL        No facility-administered encounter medications on file as of 6/28/2018.        Again, thank you for allowing me to participate in the care of your patient.      Sincerely,    Daniel Rich MD     [facility]

## 2018-06-28 NOTE — MR AVS SNAPSHOT
After Visit Summary   6/28/2018    Sujata Valencia    MRN: [id number]           Patient Information     Date Of Birth          1955        Visit Information        Provider Department      6/28/2018 1:15 PM March, Daniel Langley MD [facility]        Today's Diagnoses     Idiopathic myocarditis, unspecified chronicity           Follow-ups after your visit        Additional Services     Follow-Up with Cardiologist                 Your next 10 appointments already scheduled     Jul 02, 2018 10:30 AM CDT   (Arrive by 10:15 AM)   Office Visit with Irma Hutchinson RD   [facility] ([facility])    [address] [place] [id number] [phone]           Bring a current list of meds and any records pertaining to this visit. For Physicals, please bring immunization records and any forms needing to be filled out. Please arrive 10 minutes early to complete paperwork.            Aug 22, 2018  5:00 PM CDT   (Arrive by 4:45 PM)   NEW DIABETES with Denise Ceja MD   [facility] ([facility])    [address] [place] [id number] [phone]              Future tests that were ordered for you today     Open Future Orders        Priority Expected Expires Ordered    Follow-Up with Cardiologist Routine 6/28/2019 6/29/2019 6/28/2018    Lipid Profile Routine 6/28/2019 6/28/2019 6/28/2018    ALT Routine 6/28/2019 6/28/2019 6/28/2018    Echocardiogram Routine 6/28/2019 6/29/2019 6/28/2018    ECHO COMPLETE WITH OPTISON Routine 6/28/2018 8/1/2018 5/4/2017            Who to contact     If you have questions or need follow up information about today's clinic visit or your schedule please contact [facility] directly at [phone].  Normal or non-critical lab and imaging results will be communicated to you by  "MyChart, letter or phone within 4 business days after the clinic has received the results. If you do not hear from us within 7 days, please contact the clinic through [email or web address] or phone. If you have a critical or abnormal lab result, we will notify you by phone as soon as possible.  Submit refill requests through [employer] or call your pharmacy and they will forward the refill request to us. Please allow 3 business days for your refill to be completed.          Additional Information About Your Visit        [email or web address] Information     [employer] gives you secure access to your electronic health record. If you see a primary care provider, you can also send messages to your care team and make appointments. If you have questions, please call your primary care clinic.  If you do not have a primary care provider, please call [phone] and they will assist you.        Care EveryWhere ID     This is your Care EveryWhere ID. This could be used by other organizations to access your [place] medical records  [id number]        Your Vitals Were     Pulse Height BMI (Body Mass Index)             64 1.803 m (5' 10.98\") 27.91 kg/m2          Blood Pressure from Last 3 Encounters:   06/28/18 126/60   06/15/18 127/78   05/09/18 128/81    Weight from Last 3 Encounters:   06/28/18 90.7 kg (200 lb)   06/15/18 90 kg (198 lb 6.4 oz)   05/09/18 93.4 kg (205 lb 12.8 oz)              We Performed the Following     Follow-Up with Cardiologist        Primary Care Provider Office Phone # Fax #    Wes Harris [phone]       [address] [address]        Equal Access to Services     [facility] : Hadii corey Camara, waaxda luqadaha, qaybta kaallarry saldana. So [facility] [phone].    ATENCIÓN: Si habla español, tiene a roberts disposición servicios gratuitos de asistencia lingüística. Llame al [phone].    We comply with applicable federal civil " rights laws and [place] laws. We do not discriminate on the basis of race, color, national origin, age, disability, sex, sexual orientation, or gender identity.            Thank you!     Thank you for choosing [facility]   [facility]  for your care. Our goal is always to provide you with excellent care. Hearing back from our patients is one way we can continue to improve our services. Please take a few minutes to complete the written survey that you may receive in the mail after your visit with us. Thank you!             Your Updated Medication List - Protect others around you: Learn how to safely use, store and throw away your medicines at www.disposemymeds.org.          This list is accurate as of 6/28/18  1:48 PM.  Always use your most recent med list.                   Brand Name Dispense Instructions for use Diagnosis    aspirin 81 MG tablet      Take 1 tablet by mouth daily.        FISH OIL PO      Take 1 g by mouth daily        GINSENG PO      Take by mouth daily    Stress, Increased glucose level       glimepiride 4 MG tablet    AMARYL    60 tablet    Take 1 tablet (4 mg) by mouth 2 times daily    Increased glucose level       losartan 25 MG tablet    COZAAR    90 tablet    Take 1 tablet (25 mg) by mouth daily    Benign essential hypertension, Screen for colon cancer, Benign nodular prostatic hyperplasia without lower urinary tract symptoms, Skin lesion, Erectile dysfunction due to arterial insufficiency, Smoking, Screening for prostate cancer       metoprolol succinate 25 MG 24 hr tablet    TOPROL XL    90 tablet    Take 1 tablet (25 mg) by mouth daily    Benign essential hypertension, Screen for colon cancer, Benign nodular prostatic hyperplasia without lower urinary tract symptoms, Skin lesion       MULTIVITAMINS PO      Take 1 tablet by mouth daily.        nicotine polacrilex 2 MG gum    NICORETTE    880 each    Place 1 each (2 mg) inside cheek as needed for smoking cessation  *CHEW APPROX. 20 PCS PER DAY AS DIRECTED    Smoking, Erectile dysfunction due to arterial insufficiency, Benign essential hypertension, Screen for colon cancer, Benign nodular prostatic hyperplasia without lower urinary tract symptoms, Skin lesion, Screening for prostate cancer       olopatadine 0.1 % ophthalmic solution    PATANOL    1 Bottle    Place 1 drop into both eyes 2 times daily    Allergic conjunctivitis, bilateral       psyllium 58.6 % Powd    METAMUCIL     Take 2 teaspoonful by mouth daily        sildenafil 100 MG tablet    VIAGRA    8 tablet    Take 0.5-1 tablets ( mg) by mouth daily as needed    Erectile dysfunction due to arterial insufficiency, Benign essential hypertension, Screen for colon cancer, Benign nodular prostatic hyperplasia without lower urinary tract symptoms, Skin lesion, Smoking, Screening for prostate cancer

## 2018-06-28 NOTE — PROGRESS NOTES
HPI:     Please see dictated note    PAST MEDICAL HISTORY:  Past Medical History:   Diagnosis Date     Arthritis      Back pains, lower      Chest pain 7/28/2014     Hypertension      Myocarditis (H)      Other abnormal glucose 7/21/2011     Other and unspecified hyperlipidemia 7/21/2011     Subclinical hypothyroidism        CURRENT MEDICATIONS:  Current Outpatient Prescriptions   Medication Sig Dispense Refill     aspirin 81 MG tablet Take 1 tablet by mouth daily.       GINSENG PO Take by mouth daily       glimepiride (AMARYL) 4 MG tablet Take 1 tablet (4 mg) by mouth 2 times daily 60 tablet 1     losartan (COZAAR) 25 MG tablet Take 1 tablet (25 mg) by mouth daily 90 tablet 3     Multiple Vitamin (MULTIVITAMINS PO) Take 1 tablet by mouth daily.       nicotine polacrilex (NICORETTE) 2 MG gum Place 1 each (2 mg) inside cheek as needed for smoking cessation *CHEW APPROX. 20 PCS PER DAY AS DIRECTED 880 each 11     olopatadine (PATANOL) 0.1 % ophthalmic solution Place 1 drop into both eyes 2 times daily 1 Bottle 11     Omega-3 Fatty Acids (FISH OIL PO) Take 1 g by mouth daily        psyllium (METAMUCIL) 58.6 % POWD Take 2 teaspoonful by mouth daily       sildenafil (VIAGRA) 100 MG tablet Take 0.5-1 tablets ( mg) by mouth daily as needed 8 tablet 12     metoprolol succinate (TOPROL XL) 25 MG 24 hr tablet Take 1 tablet (25 mg) by mouth daily (Patient not taking: Reported on 6/28/2018) 90 tablet 2       PAST SURGICAL HISTORY:  Past Surgical History:   Procedure Laterality Date     COLONOSCOPY  1998    fistula      CORONARY ANGIOGRAPHY ADULT ORDER  07/29/2014    normal coronary arteries       ALLERGIES     Allergies   Allergen Reactions     Seasonal Allergies        FAMILY HISTORY:  Family History   Problem Relation Age of Onset     Osteoperosis Mother      Diabetes Maternal Grandmother      Glaucoma No family hx of      Macular Degeneration No family hx of        SOCIAL HISTORY:  Social History     Social History      "Marital status:      Spouse name: N/A     Number of children: 1     Years of education: N/A     Occupational History      Unknown     SEMI-RETIRED, OWNS A [employer]     Social History Main Topics     Smoking status: Former Smoker     Packs/day: 1.00     Years: 25.00     Start date: 1975     Quit date: 10/31/2001     Smokeless tobacco: Never Used     Alcohol use Yes      Comment: scotch 2 times a week, wine 5 days per week at drs request     Drug use: No     Sexual activity: Not Asked     Other Topics Concern     None     Social History Narrative       ROS:   Constitutional: No fever, chills, or sweats. No weight gain/loss   ENT: No visual disturbance, ear ache, epistaxis, sore throat  Allergies/Immunologic: Negative.   Respiratory: No cough, hemoptysia  Cardiovascular: As per HPI  GI: No nausea, vomiting, hematemesis, melena, or hematochezia  : No urinary frequency, dysuria, or hematuria  Integument: Negative  Psychiatric: Negative  Neuro: Negative  Endocrinology: Negative   Musculoskeletal: Negative    EXAM:  /60  Pulse 64  Ht 1.803 m (5' 10.98\")  Wt 90.7 kg (200 lb)  BMI 27.91 kg/m2  In general, the patient is a pleasant male in no apparent distress.    HEENT: NC/AT.  PERRLA.  EOMI.  Sclerae white, not injected.  Nares clear.  Pharynx without erythema or exudate.  Dentition intact.    Neck: . Carotids +4/4 bilaterally without bruits.  No jugular venous distension.   Heart: RRR. Normal S1, S2 splits physiologically. No murmur, rub, click, or gallop.  Lungs: CTA.  No ronchi, wheezes, rales.    Abdomen: Soft, nontender, nondistended.  Extremities: No clubbing, cyanosis, or edema  Neurologic: Alert and oriented to person/place/time, normal speech, gait and affect  Skin: No petechiae, purpura or rash.    Labs:  LIPID RESULTS:  Lab Results   Component Value Date    CHOL 175 06/08/2018    HDL 51 06/08/2018    LDL 95 06/08/2018    TRIG 146 06/08/2018    CHOLHDLRATIO 4.6 10/03/2014    [facility] 124 " 06/08/2018       LIVER ENZYME RESULTS:  Lab Results   Component Value Date    AST 41 06/20/2018    ALT 91 (H) 06/20/2018       CBC RESULTS:  Lab Results   Component Value Date    WBC 5.5 06/08/2018    RBC 5.12 06/08/2018    HGB 15.8 06/08/2018    HCT 45.3 06/08/2018    MCV 89 06/08/2018    MCH 30.9 06/08/2018    MCHC 34.9 06/08/2018    RDW 12.1 06/08/2018     06/08/2018       BMP RESULTS:  Lab Results   Component Value Date     06/20/2018    POTASSIUM 4.2 06/20/2018    CHLORIDE 99 06/20/2018    CO2 22 06/20/2018    ANIONGAP 12 06/20/2018     (H) 06/20/2018    BUN 13 06/20/2018    CR 0.80 06/20/2018    GFRESTIMATED >90 06/20/2018    GFRESTBLACK >90 06/20/2018    TRESSA 9.2 06/20/2018        A1C RESULTS:  Lab Results   Component Value Date    A1C 10.2 (H) 06/08/2018       INR RESULTS:  Lab Results   Component Value Date    INR 1.09 07/20/2017    INR 1.04 03/04/2016       ROGER Rich MD     CC  Patient Care Team:  Yael Navarro MD as PCP - General (Internal Medicine)  Maria Teresa Cabrera MD as MD (Ophthalmology)  YAEL NAVARRO

## 2018-06-29 NOTE — PROGRESS NOTES
Service Date: 06/28/2018      HISTORY OF PRESENT ILLNESS:  Mr. Valencia is a pleasant 62-year-old gentleman who I last saw in 05/2017.  I met him in 2014 when he presented with chest pain and was found to have myocarditis.  On coronary angiogram at that time, he did not have any coronary artery disease.      Mr. Valencia has been doing well.  His ejection fraction has been very stable in the range of about 50% since diagnosis.  He has been on Cozaar and Toprol and tolerating them well.  He has not had any symptoms of chest pain or discomfort.  He is very active, walking and feels well with this activity.      Unfortunately, Mr. Valencia has just been diagnosed with diabetes.  He is now watching his alcohol intake more closely.  He was drinking 4 large alcoholic beverages a day.  He is also walking.  We discussed the alcohol could play a role in his chronic cardiomyopathy as well and that this may help with that, which he was glad to hear.  He is on a baby aspirin.  He is not interested in being on a statin.  His last cholesterol showed an HDL of 51 and LDL of 95.  His A1c was up to 10 with the diagnosis.  It sounds like it was pretty rapid onset.  He noted he had lost nearly 10 pounds in a month.      IMPRESSION, REPORT, PLAN:   1.  History of myocarditis with ejection fraction stable around 50%, unchanged compared to 2014.   2.  Hyperlipidemia, not on treatment, as he is not interested in being on a statin.   3.  Hypertension, well controlled.   4.  Diabetes, new diagnosis.   5.  History of alcohol abuse.      DISCUSSION:  It was a pleasure to see Mr. Valencia in followup.  Clinically, he is doing really well.  He has made some lifestyle changes now with the diagnosis of diabetes, which I was really glad to see her.  I am hoping that he can remain primarily abstinent from alcohol, which may help with his cardiomyopathy.  He does not want to be on a statin, so it was not initiated in the setting of his diabetes.  He did not have  significant coronary artery disease at the time of his cath, however, in .  We discussed followup in a year or sooner if there are any issues in the interim.  He understands and is in agreement with this plan as outlined.  All questions were answered.  It was a pleasure to see him.  Please do not hesitate to contact me with any questions or concerns.         LUCAS BUNCH MD             D: 2018   T: 2018   MT: KIRK      Name:     ZULEIKA REYES   MRN:      [id number]        Account:      [id number]   :      1955           Service Date: 2018      Document: [id number]

## 2018-07-02 ENCOUNTER — OFFICE VISIT (OUTPATIENT)
Dept: EDUCATION SERVICES | Facility: CLINIC | Age: 63
End: 2018-07-02
Payer: COMMERCIAL

## 2018-07-02 VITALS — WEIGHT: 203.4 LBS | BODY MASS INDEX: 28.38 KG/M2

## 2018-07-02 DIAGNOSIS — E11.9 DIABETES MELLITUS WITHOUT COMPLICATION (H): Primary | ICD-10-CM

## 2018-07-02 NOTE — MR AVS SNAPSHOT
After Visit Summary   2018    Sujata Valencia    MRN: [id number]           Patient Information     Date Of Birth          1955        Visit Information        Provider Department      2018 10:30 AM Irma Hutchinson RD M [facility] Diabetes        Care Instructions    1. We reviewed healthy diet and carbohydrate counting today. Aim for 60-75 grams of carbohydrate for each meal and 0-30 grams for snacks if you are hungry  2. Great job with walking, keep walking every day for at least 30 minutes  3. We reviewed low blood sugar today. Always carry carbohydrate with you such as glucose tabs when you are away from home  4. Drink alcohol with a snack or meal, not on an empty stomach. Limit 2 drinks per occasion.  5. Test your blood sugar 2 times per day. Fastin-130 and 2 hours after meals: less than 180  6. Follow up  at 2:30 with Ana DE LEÓN  7. FOllow up  at 1:30 with me and 2:30 with Ana Hutchinson RD, LD, CDE  Diabetes Care  [address]  [address] [address]  Phone: [phone]  Appointment line: [phone]  Email: [email or web address]              Follow-ups after your visit        Your next 10 appointments already scheduled     Aug 22, 2018  5:00 PM CDT   (Arrive by 4:45 PM)   NEW DIABETES with MD GURPREET Page [facility] Endocrinology ([facility])    [address] [place] [id number] [phone]              Who to contact     Please call your clinic at [phone] to:    Ask questions about your health    Make or cancel appointments    Discuss your medicines    Learn about your test results    Speak to your doctor            Additional Information About Your Visit        General [employer] Information     [employer] gives you secure access to your electronic health record. If you see a primary care provider, you can also send messages to  your care team and make appointments. If you have questions, please call your primary care clinic.  If you do not have a primary care provider, please call [phone] and they will assist you.      [employer] is an electronic gateway that provides easy, online access to your medical records. With [employer], you can request a clinic appointment, read your test results, renew a prescription or communicate with your care team.     To access your existing account, please contact your [facility] or call [phone] for assistance.        Care EveryWhere ID     This is your Care EveryWhere ID. This could be used by other organizations to access your [place] medical records  [id number]         Blood Pressure from Last 3 Encounters:   06/28/18 126/60   06/15/18 127/78   05/09/18 128/81    Weight from Last 3 Encounters:   06/28/18 90.7 kg (200 lb)   06/15/18 90 kg (198 lb 6.4 oz)   05/09/18 93.4 kg (205 lb 12.8 oz)              Today, you had the following     No orders found for display       Primary Care Provider Office Phone # Fax #    Wes Harris [phone] [address] [address]        Equal Access to Services     FRENCH MCELROY : Hadii aad ku hadasho Soomaali, waaxda luqadaha, qaybta kaalmada adeegyada, waxay idiin hayantonion cat hoang larichard . So [facility] [phone].    ATENCIÓN: Si habla español, tiene a roberts disposición servicios gratuitos de asistencia lingüística. Llame al [phone].    We comply with applicable federal civil rights laws and [place] laws. We do not discriminate on the basis of race, color, national origin, age, disability, sex, sexual orientation, or gender identity.            Thank you!     Thank you for choosing [facility] DIABETES  for your care. Our goal is always to provide you with excellent care. Hearing back from our patients is one way we can continue to improve our services. Please take a few minutes to  complete the written survey that you may receive in the mail after your visit with us. Thank you!             Your Updated Medication List - Protect others around you: Learn how to safely use, store and throw away your medicines at www.disposemymeds.org.          This list is accurate as of 7/2/18 12:01 PM.  Always use your most recent med list.                   Brand Name Dispense Instructions for use Diagnosis    aspirin 81 MG tablet      Take 1 tablet by mouth daily.        blood glucose monitoring meter device kit    no brand specified    1 kit    Use to test blood sugar 2-4 times daily or as directed.    Increased glucose level       blood glucose monitoring test strip    no brand specified    100 strip    Use to test blood sugars 2-4 times daily or as directed    Increased glucose level       FISH OIL PO      Take 1 g by mouth daily        GINSENG PO      Take by mouth daily    Stress, Increased glucose level       glimepiride 4 MG tablet    AMARYL    60 tablet    Take 1 tablet (4 mg) by mouth 2 times daily    Increased glucose level       losartan 25 MG tablet    COZAAR    90 tablet    Take 1 tablet (25 mg) by mouth daily    Benign essential hypertension, Screen for colon cancer, Benign nodular prostatic hyperplasia without lower urinary tract symptoms, Skin lesion, Erectile dysfunction due to arterial insufficiency, Smoking, Screening for prostate cancer       metoprolol succinate 25 MG 24 hr tablet    TOPROL XL    90 tablet    Take 1 tablet (25 mg) by mouth daily    Benign essential hypertension, Screen for colon cancer, Benign nodular prostatic hyperplasia without lower urinary tract symptoms, Skin lesion       MULTIVITAMINS PO      Take 1 tablet by mouth daily.        nicotine polacrilex 2 MG gum    NICORETTE    880 each    Place 1 each (2 mg) inside cheek as needed for smoking cessation *CHEW APPROX. 20 PCS PER DAY AS DIRECTED    Smoking, Erectile dysfunction due to arterial insufficiency, Benign  essential hypertension, Screen for colon cancer, Benign nodular prostatic hyperplasia without lower urinary tract symptoms, Skin lesion, Screening for prostate cancer       olopatadine 0.1 % ophthalmic solution    PATANOL    1 Bottle    Place 1 drop into both eyes 2 times daily    Allergic conjunctivitis, bilateral       psyllium 58.6 % Powd    METAMUCIL     Take 2 teaspoonful by mouth daily        sildenafil 100 MG tablet    VIAGRA    8 tablet    Take 0.5-1 tablets ( mg) by mouth daily as needed    Erectile dysfunction due to arterial insufficiency, Benign essential hypertension, Screen for colon cancer, Benign nodular prostatic hyperplasia without lower urinary tract symptoms, Skin lesion, Smoking, Screening for prostate cancer

## 2018-07-02 NOTE — PROGRESS NOTES
"  Diabetes Self Management Training: Individual Review Visit    Sujata Valencia presents today for education related to Type 2 diabetes.    He is accompanied by self    Patient Problem List and Family Medical History reviewed for relevant medical history, current medical status, and diabetes risk factors.    Current Diabetes Management per Patient:  Taking diabetes medications?   yes:     Diabetes Medication(s)     Sulfonylureas Sig    glimepiride (AMARYL) 4 MG tablet Take 1 tablet (4 mg) by mouth 2 times daily          Past Diabetes Education: Newly diagnosed    Patient glucose self monitoring as follows: BG meter taught today.   BG results: post-breakfast glucose- 151mg/dl 2.5 hours after breakfast in my office today     Do you have any difficulty affording your medications or glucose monitoring supplies?  No           Vitals:  Wt 92.3 kg (203 lb 6.4 oz)  BMI 28.38 kg/m2  Estimated body mass index is 28.38 kg/(m^2) as calculated from the following:    Height as of 6/28/18: 1.803 m (5' 10.98\").    Weight as of this encounter: 92.3 kg (203 lb 6.4 oz).   Last 3 BP:   BP Readings from Last 3 Encounters:   06/28/18 126/60   06/15/18 127/78   05/09/18 128/81     History   Smoking Status     Former Smoker     Packs/day: 1.00     Years: 25.00     Start date: 1975     Quit date: 10/31/2001   Smokeless Tobacco     Never Used       Labs:  Lab Results   Component Value Date    A1C 10.2 06/08/2018     Lab Results   Component Value Date     06/20/2018     Lab Results   Component Value Date    LDL 95 06/08/2018     HDL Cholesterol   Date Value Ref Range Status   06/08/2018 51 >39 mg/dL Final   ]  GFR Estimate   Date Value Ref Range Status   06/20/2018 >90 >60 mL/min/1.7m2 Final     Comment:     Non  GFR Calc     GFR Estimate If Black   Date Value Ref Range Status   06/20/2018 >90 >60 mL/min/1.7m2 Final     Comment:      GFR Calc     Lab Results   Component Value Date    CR 0.80 " 06/20/2018     No results found for: MICROALBUMIN    Nutrition Review:  Sujata is here today for new dx type 2 diabetes. I have read his PMH. He is retired, lives with his wife. He is originally from [place]. He tells me he is exercising more and has cut down on rice and red meat since his diagnosis. He has many questions today. He tells me he does not like to take medications, but he is taking his glimipiride, however he would like it to be reduced. He asks me if he should get a juicer. He states he eats very healthy.     Diet Recall:   Breakfast:tea with 1% milk and 2 biscuits after his morning walk, then psylium husk with pomegranite juice.Then fruit, 2 eggs/ 3-4 crackers, 15-20 almonds, black coffee  AM snack:none  Lunch:salad, grilled fish/chicken/meat/sausage, fruit or rice/lentils or chick peas or black bean and salad  PM snack:tea with 2 crackers/chickpeas/peanuts/vegetables/cheese  Dinner:chicken/meat/fish/vegetables/yogurt/lentils, 1 large chapatti, water  Evening snack:minerva    Eats out in restaurants: about 2 times per month: Chinese or Azeri  Beverages: water, coffee, tea, sparkling water  ETOH: 2 vodka/lime/club soda per Weekend night 2x/wk     Physical Activity:    Walks dog every morning 1.5 miles, yardwork/mowing , walks again a few nights/wk with his wife 1.5 miles.      REviewed basic pathophysiology of type 2 diabetes focusing on weight loss and exercise to improve insulin resistance. Gave Antonia written and verbal information on general healthy eating, low saturated, low trans fat, low sodium and carbohydrate counting. Discussed benefits of moderate weight loss of 5-10%, approximately 2-4# per month for improved diabetes control regarding blood glucose, blood pressure and lipids. Worked with Sujata  to set goals for improved diet and weight loss. Reviewed low bg rx and instructed Ernst to always carry carbs with  Him when away from home. INstructed Ernst to have a meal or  snack with his vodka to prevent low bg. Instructed pt on the One Touch Verio bg meter. Reviewed bg goals:  fasting and less than 180 2 hours after meals. His bg 2.5 hours after breakfast today was 151mg/dl. HE tells me he wants to check his bg 2x/day.   Education provided today on:  AADE Self-Care Behaviors:  Healthy Eating: carbohydrate counting, weight reduction, heart healthy diet, portion control and label reading  Being Active: relationship to blood glucose, describe appropriate activity program and precautions to take  Monitoring: purpose, proper technique, individual blood glucose targets, frequency of monitoring and proper sharps disposal  Problem Solving: low blood glucose - causes, signs/symptoms, treatment and prevention and carrying a carbohydrate source at all times    Pt verbalized understanding of concepts discussed and recommendations provided today.       Education Materials Provided:  Thierno Taking Charge of Your Diabetes Book and Carbohydrate Counting    ASSESSMENT: Sujata verbalized recommendations today however he appears very anxious about taking diabetes medications and verbalized many times during our session that he would like to stop/decrese the glimipiride soon which I explained that with exercise and weight loss this may be a possibility at some point however it is not possible to predict at this time. He may not be a carb counting candidate, focusing on portion control may be best option for him. He needs full diabetes education and reinforcement and support.       PLAN:  See Patient Instructions for co-developed, patient-stated behavior change goals.  AVS printed and provided to patient today.    FOLLOW-UP:  Follow-up appointment scheduled on 7/12 with Ana Ford and 8/1 with Ana and myself.  Chart routed to referring provider.    Time Spent: 90 minutes  Encounter Type: Individual    Any diabetes medication dose changes were made via the CDE Protocol and Collaborative  Practice Agreement with the patient's referring provider. A copy of this encounter was shared with the provider.

## 2018-07-02 NOTE — PATIENT INSTRUCTIONS
1. We reviewed healthy diet and carbohydrate counting today. Aim for 60-75 grams of carbohydrate for each meal and 0-30 grams for snacks if you are hungry  2. Great job with walking, keep walking every day for at least 30 minutes  3. We reviewed low blood sugar today. Always carry carbohydrate with you such as glucose tabs when you are away from home  4. Drink alcohol with a snack or meal, not on an empty stomach. Limit 2 drinks per occasion.  5. Test your blood sugar 2 times per day. Fastin-130 and 2 hours after meals: less than 180  6. Follow up  at 2:30 with Ana GUERINE  7. FOllow up  at 1:30 with me and 2:30 with Ana Hutchinson RD, LD, CDE  Diabetes Care  [address]  [address] [place] [address]  [id number]  Phone: [phone]  Appointment line: [phone]  Email: [email or web address]

## 2018-07-12 ENCOUNTER — OFFICE VISIT (OUTPATIENT)
Dept: EDUCATION SERVICES | Facility: CLINIC | Age: 63
End: 2018-07-12
Payer: COMMERCIAL

## 2018-07-12 DIAGNOSIS — E11.9 DIABETES MELLITUS, TYPE 2 (H): Primary | ICD-10-CM

## 2018-07-12 NOTE — MR AVS SNAPSHOT
After Visit Summary   7/12/2018    Sujata Valencia    MRN: [id number]           Patient Information     Date Of Birth          1955        Visit Information        Provider Department      7/12/2018 2:30 PM Ana Ford RN M [facility] Diabetes        Today's Diagnoses     Diabetes mellitus, type 2 (H)    -  1       Follow-ups after your visit        Your next 10 appointments already scheduled     Jul 27, 2018  7:50 AM CDT   US ABDOMEN COMPLETE with SHUS1   [facility] Ultrasound ([facility] [place])    [address] [facility] [address] [phone]           Please bring a list of your medicines (including vitamins, minerals and over-the-counter drugs). Also, tell your doctor about any allergies you may have. Wear comfortable clothes and leave your valuables at home.  Adults: No eating, smoking, gum chewing or drinking for 8 hours before the exam. You may take medicine with a small sip of water.  Children: - Infants, breast-fed: may have breast milk up to 2 hours before exam. - Infants, formula: may have bottle until 4 hours before exam. - Children 1-5 years: No food or drink for 4 hours before exam. - Children 6 -12 years: No food or drink for 6 hours before exam. - Children over 12 years: No food or drink for 8 hours before exam. - J Tube Fed: No need to stop feedings.  Please call the Imaging Department at your exam site with any questions.            Aug 01, 2018  1:30 PM CDT   (Arrive by 1:15 PM)   Office Visit with KALYAN Dobbins [facility] Diabetes ([facility])    [address] [place] [address] [phone]           Bring a current list of meds and any records pertaining to this visit. For Physicals, please bring immunization records and any forms needing to be filled out. Please arrive 10 minutes early to complete paperwork.            Aug 01, 2018  2:30 PM CDT   (Arrive by 2:15 PM)   Office  Visit with Ana Ford RN   [facility] Diabetes ([facility])    [address] [address] [phone]           Bring a current list of meds and any records pertaining to this visit. For Physicals, please bring immunization records and any forms needing to be filled out. Please arrive 10 minutes early to complete paperwork.            Aug 22, 2018  5:00 PM CDT   (Arrive by 4:45 PM)   NEW DIABETES with Denise Ceja MD   [facility] Endocrinology ([facility])    [address] [address] [phone]              Who to contact     Please call your clinic at [phone] to:    Ask questions about your health    Make or cancel appointments    Discuss your medicines    Learn about your test results    Speak to your doctor            Additional Information About Your Visit        [employer] Information     [employer] gives you secure access to your electronic health record. If you see a primary care provider, you can also send messages to your care team and make appointments. If you have questions, please call your primary care clinic.  If you do not have a primary care provider, please call [phone] and they will assist you.      [employer] is an electronic gateway that provides easy, online access to your medical records. With [employer], you can request a clinic appointment, read your test results, renew a prescription or communicate with your care team.     To access your existing account, please contact your [facility] or call [phone] for assistance.        Care EveryWhere ID     This is your Care EveryWhere ID. This could be used by other organizations to access your [place] medical records  [id number]         Blood Pressure from Last 3 Encounters:   06/28/18 126/60   06/15/18 127/78   05/09/18 128/81    Weight from Last 3 Encounters:   07/02/18 92.3 kg  (203 lb 6.4 oz)   06/28/18 90.7 kg (200 lb)   06/15/18 90 kg (198 lb 6.4 oz)              Today, you had the following     No orders found for display       Primary Care Provider Office Phone # Fax #    Wes Harris [phone] [address] [address]        Equal Access to Services     ELIS MCELROY : Hadii aad ku hadasho Soomaali, waaxda luqadaha, qaybta kaalmada adeegyada, waxay idiin hayaan adeeg kharash la'aan ah. So [place] [phone].    ATENCIÓN: Si habla fern, tiene a roberts disposición servicios gratuitos de asistencia lingüística. Llame al [phone].    We comply with applicable federal civil rights laws and [place] laws. We do not discriminate on the basis of race, color, national origin, age, disability, sex, sexual orientation, or gender identity.            Thank you!     Thank you for choosing [facility] DIABETES  for your care. Our goal is always to provide you with excellent care. Hearing back from our patients is one way we can continue to improve our services. Please take a few minutes to complete the written survey that you may receive in the mail after your visit with us. Thank you!             Your Updated Medication List - Protect others around you: Learn how to safely use, store and throw away your medicines at www.disposemymeds.org.          This list is accurate as of 7/12/18 11:59 PM.  Always use your most recent med list.                   Brand Name Dispense Instructions for use Diagnosis    aspirin 81 MG tablet      Take 1 tablet by mouth daily.        blood glucose monitoring lancets     100 each    Use to test blood sugars 2 times daily or as directed.    Diabetes mellitus without complication (H)       blood glucose monitoring meter device kit    no brand specified    1 kit    Use to test blood sugar 2-4 times daily or as directed.    Increased glucose level       * blood glucose monitoring test strip    no brand specified    100  strip    Use to test blood sugars 2-4 times daily or as directed    Increased glucose level       * blood glucose monitoring test strip    no brand specified    100 strip    Use to test blood sugars 2 times daily or as directed    Diabetes mellitus without complication (H)       FISH OIL PO      Take 1 g by mouth daily        GINSENG PO      Take by mouth daily    Stress, Increased glucose level       glimepiride 4 MG tablet    AMARYL    60 tablet    Take 1 tablet (4 mg) by mouth 2 times daily    Increased glucose level       losartan 25 MG tablet    COZAAR    90 tablet    Take 1 tablet (25 mg) by mouth daily    Benign essential hypertension, Screen for colon cancer, Benign nodular prostatic hyperplasia without lower urinary tract symptoms, Skin lesion, Erectile dysfunction due to arterial insufficiency, Smoking, Screening for prostate cancer       metoprolol succinate 25 MG 24 hr tablet    TOPROL XL    90 tablet    Take 1 tablet (25 mg) by mouth daily    Benign essential hypertension, Screen for colon cancer, Benign nodular prostatic hyperplasia without lower urinary tract symptoms, Skin lesion       MULTIVITAMINS PO      Take 1 tablet by mouth daily.        nicotine polacrilex 2 MG gum    NICORETTE    880 each    Place 1 each (2 mg) inside cheek as needed for smoking cessation *CHEW APPROX. 20 PCS PER DAY AS DIRECTED    Smoking, Erectile dysfunction due to arterial insufficiency, Benign essential hypertension, Screen for colon cancer, Benign nodular prostatic hyperplasia without lower urinary tract symptoms, Skin lesion, Screening for prostate cancer       olopatadine 0.1 % ophthalmic solution    PATANOL    1 Bottle    Place 1 drop into both eyes 2 times daily    Allergic conjunctivitis, bilateral       psyllium 58.6 % Powd    METAMUCIL     Take 2 teaspoonful by mouth daily        sildenafil 100 MG tablet    VIAGRA    8 tablet    Take 0.5-1 tablets ( mg) by mouth daily as needed    Erectile dysfunction due  to arterial insufficiency, Benign essential hypertension, Screen for colon cancer, Benign nodular prostatic hyperplasia without lower urinary tract symptoms, Skin lesion, Smoking, Screening for prostate cancer       * Notice:  This list has 2 medication(s) that are the same as other medications prescribed for you. Read the directions carefully, and ask your doctor or other care provider to review them with you.

## 2018-07-16 NOTE — PROGRESS NOTES
"DIABETES EDUCATION NOTE:    Sujata Valencia presents today for education related to Type 2 diabetes    Patient is being treated with:  oral agents, diet and exercise  He is accompanied by self    PATIENT CONCERNS RELATED TO DIABETES SELF MANAGEMENT:     \"I am going to beat this thing.\"  \"I want off the medications, it has to be reduced.\"    States that he is having hypoglycemia on current dose of Glimeperide dose of 4 mg in AM and 2 mg in the evening.    He saw our dietitian about a month ago.  He has reduced his calories extensively but is eating about 60-75 grams of CHO at meals.     ASSESSMENT:  Current Diabetes Management per Patient:  Taking diabetes medications?   yes:     Diabetes Medication(s)     Sulfonylureas Sig    glimepiride (AMARYL) 4 MG tablet Take 1 tablet (4 mg) by mouth 2 times daily        Monitoring  Patient glucose self monitoring as follows: two times daily.   BG meter: Contour Next One meter  BG results: see blood glucose log attached to this encounter     BG values are: In goal  Patient's most recent   Lab Results   Component Value Date    A1C 10.2 06/08/2018    is not meeting goal of <7.0    Todays Blood Glucose result was 135 about an hour after a meal on Enio Contour meter.    Exercise: no regular exercise program    Nutrition:   Eats three meals per day, about 60-75 grams per, plus snacks of 15-30 grams.  Diet is high in fruits and vegetables.  No sugared beverages.       Hypoglycemia:   Patient is at risk of hypoglycemia? Yes.  Note the two episodes of hypoglycemia that occurred in the late morning, which is typically after he has been working out in the yard (does all of his own yardwork).  He independently reduced his Glimepiride from 4 mg BID to 4 mg in AM and 2 mg in PM about 4 days prior to this appointment.                           EDUCATION and INSTRUCTION PROVIDED AT THIS VISIT:      Reviewed his current diet.  States that he is attempting to lose a modest amount of " weight.  He states that he has reduced his alcohol intake in an effort to get his LFT's to normalize. He now is only drinking alcohol on the weekends. He has the idea that alcohol contains sugar, as well.  We had a discussion about the effect that alcohol has on blood glucose control and potential for hypoglycemia, particularly in light of using sulfonylureas.  Encouraged abstinence.  He would like to use Metformin instead of a sulfonylurea.  Explained that likely the reason that Metformin was not prescribed for him had to do with his LFT's and history.  Reviewed how Glimepiride works--discovered today that in addition to taking his medication, he is also eating Fenugreek and Bitter Gourd (Bitter Melon) as well.  Discussed the effect that these foods can have on blood glucose.  He is not going to stop eating them, as he believes that they are beneficial and states that in [place], this is all that is prescribed for diabetes.  He is currently having mild hypoglycemia two to three times per week.  Advised that if he is not going to stop eating Fenugreek and Bitter Gourd, that he should probably reduce the amount of Glimepiride he is taking.  Advised to change his dosing to taking a single dose of 4 mg in the evening, to prevent daytime hypoglycemia when active.      His blood glucoses today appear to be in very good control.  Explained that he will need to have several more appointments in order to receive all the information he needs to manage his diabetes going forward.  Also reviewed the pathophysiology of diabetes and explained that although we can certainly reduce risk by good control, that there is no cure for diabetes and life long management is needed to prevent complications.      Patient-stated goal written and given to Sujata Valencia.  Verbalized and demonstrated understanding of instructions.     PLAN:  See patient instructions  AVS printed and given to patient    FOLLOW-UP:        Time spent with  patient at today's visit was 60 minutes.      Any diabetes medication dose changes were made via the CDE Protocol and Collaborative Practice Agreement with [facility].  A copy of this encounter was provided to patient's referring provider.

## 2018-07-27 ENCOUNTER — HOSPITAL ENCOUNTER (OUTPATIENT)
Dept: LAB | Facility: CLINIC | Age: 63
End: 2018-07-27
Attending: INTERNAL MEDICINE
Payer: COMMERCIAL

## 2018-07-27 ENCOUNTER — TELEPHONE (OUTPATIENT)
Dept: CARDIOLOGY | Facility: CLINIC | Age: 63
End: 2018-07-27

## 2018-07-27 ENCOUNTER — HOSPITAL ENCOUNTER (OUTPATIENT)
Dept: ULTRASOUND IMAGING | Facility: CLINIC | Age: 63
Discharge: HOME | End: 2018-07-27
Attending: INTERNAL MEDICINE | Admitting: INTERNAL MEDICINE
Payer: COMMERCIAL

## 2018-07-27 DIAGNOSIS — I40.1: Primary | ICD-10-CM

## 2018-07-27 DIAGNOSIS — R73.09 INCREASED GLUCOSE LEVEL: ICD-10-CM

## 2018-07-27 DIAGNOSIS — I40.1 IDIOPATHIC MYOCARDITIS, UNSPECIFIED CHRONICITY: ICD-10-CM

## 2018-07-27 DIAGNOSIS — R74.8 ELEVATED LIVER ENZYMES: ICD-10-CM

## 2018-07-27 DIAGNOSIS — E78.5 HYPERLIPIDEMIA LDL GOAL <100: Primary | ICD-10-CM

## 2018-07-27 LAB — ALT SERPL W P-5'-P-CCNC: 68 U/L (ref 0–70)

## 2018-07-27 PROCEDURE — 84460 ALANINE AMINO (ALT) (SGPT): CPT | Performed by: INTERNAL MEDICINE

## 2018-07-27 PROCEDURE — 36415 COLL VENOUS BLD VENIPUNCTURE: CPT | Performed by: INTERNAL MEDICINE

## 2018-07-27 PROCEDURE — 76700 US EXAM ABDOM COMPLETE: CPT

## 2018-07-27 NOTE — TELEPHONE ENCOUNTER
ALT ordered by Dr. Rich drawn this am at [facility]. Pt was not due for labs until July 2019, new ALT order placed. Called pt and communicated ALT within normal limits, advised will re-check again next year with lipids prior to annual visit with Dr. Rich. Pt verbalized understanding, no further questions.    Component      Latest Ref Rng & Units 7/27/2018   ALT      0 - 70 U/L 68

## 2018-08-01 ENCOUNTER — OFFICE VISIT (OUTPATIENT)
Dept: EDUCATION SERVICES | Facility: CLINIC | Age: 63
End: 2018-08-01
Payer: COMMERCIAL

## 2018-08-01 ENCOUNTER — MYC MEDICAL ADVICE (OUTPATIENT)
Dept: INTERNAL MEDICINE | Facility: CLINIC | Age: 63
End: 2018-08-01

## 2018-08-01 VITALS — BODY MASS INDEX: 28.38 KG/M2 | WEIGHT: 203.4 LBS

## 2018-08-01 DIAGNOSIS — E11.9 DIABETES MELLITUS, TYPE 2 (H): Primary | ICD-10-CM

## 2018-08-01 DIAGNOSIS — E11.9 DIABETES MELLITUS, TYPE 2 (H): ICD-10-CM

## 2018-08-01 DIAGNOSIS — E11.9 DIABETES MELLITUS WITHOUT COMPLICATION (H): Primary | ICD-10-CM

## 2018-08-01 RX ORDER — METFORMIN HCL 500 MG
500 TABLET, EXTENDED RELEASE 24 HR ORAL 2 TIMES DAILY WITH MEALS
Qty: 60 TABLET | Refills: 11 | Status: SHIPPED | OUTPATIENT
Start: 2018-08-01 | End: 2018-08-22

## 2018-08-01 RX ORDER — GLIMEPIRIDE 2 MG/1
2 TABLET ORAL
COMMUNITY
Start: 2018-08-01 | End: 2018-08-22

## 2018-08-01 NOTE — MR AVS SNAPSHOT
After Visit Summary   8/1/2018    Sujata Valencia    MRN: [id number]           Patient Information     Date Of Birth          1955        Visit Information        Provider Department      8/1/2018 2:30 PM Ana Ford RN [facility] Diabetes        Today's Diagnoses     Diabetes mellitus, type 2 (H)    -  1       Follow-ups after your visit        Your next 10 appointments already scheduled     Aug 08, 2018  2:35 PM CDT   (Arrive by 2:20 PM)   Return Visit with Wes Harris MD   [facility] Primary Care Clinic ([facility])    [address] [facility]  [address] [facility] [address] [phone]            Aug 22, 2018  5:00 PM CDT   (Arrive by 4:45 PM)   NEW DIABETES with Denise Ceja MD   [facility] Endocrinology ([facility])    [address] [place] [address]  [address] [facility] [address] [phone]              Who to contact     Please call your clinic at [phone] to:    Ask questions about your health    Make or cancel appointments    Discuss your medicines    Learn about your test results    Speak to your doctor            Additional Information About Your Visit        [employer] Information     [employer] gives you secure access to your electronic health record. If you see a primary care provider, you can also send messages to your care team and make appointments. If you have questions, please call your primary care clinic.  If you do not have a primary care provider, please call [phone] and they will assist you.      [employer] is an electronic gateway that provides easy, online access to your medical records. With [employer], you can request a clinic appointment, read your test results, renew a prescription or communicate with your care team.     To access your existing account, please contact your [facility] or call [phone] for assistance.        Care EveryWhere ID     This is your Care  EveryWhere ID. This could be used by other organizations to access your [place] medical records  [id number]         Blood Pressure from Last 3 Encounters:   06/28/18 126/60   06/15/18 127/78   05/09/18 128/81    Weight from Last 3 Encounters:   08/01/18 92.3 kg (203 lb 6.4 oz)   07/02/18 92.3 kg (203 lb 6.4 oz)   06/28/18 90.7 kg (200 lb)              Today, you had the following     No orders found for display         Today's Medication Changes          These changes are accurate as of 8/1/18  5:03 PM.  If you have any questions, ask your nurse or doctor.               Start taking these medicines.        Dose/Directions    metFORMIN 500 MG 24 hr tablet   Commonly known as:  GLUCOPHAGE-XR   Used for:  Diabetes mellitus, type 2 (H)   Started by:  Ana Ford RN        Dose:  500 mg   Take 1 tablet (500 mg) by mouth 2 times daily (with meals)   Quantity:  60 tablet   Refills:  11         These medicines have changed or have updated prescriptions.        Dose/Directions    AMARYL 2 MG tablet   This may have changed:    - medication strength  - how much to take   Generic drug:  glimepiride   Changed by:  Ana Ford RN        Dose:  2 mg   Take 1 tablet (2 mg) by mouth 2 times daily   Refills:  0            Where to get your medicines      These medications were sent to [facility] - [place], [place] - [address]  [address], [facility] [address]     Phone:  [phone]     metFORMIN 500 MG 24 hr tablet                Primary Care Provider Office Phone # Fax #    Wes Harris [phone] [address] [address]        Equal Access to Services     [facility]: Hadii corey hurd Sojuliet, waaxda luqadaha, qaybta kaalmada [place], larry bower. So [facility] [phone].    ATENCIÓN: Si habla español, tiene a roberts disposición servicios gratuitos de asistencia lingüística. Llame al [phone].    We comply with applicable  federal civil rights laws and [place] laws. We do not discriminate on the basis of race, color, national origin, age, disability, sex, sexual orientation, or gender identity.            Thank you!     Thank you for choosing [facility] DIABETES  for your care. Our goal is always to provide you with excellent care. Hearing back from our patients is one way we can continue to improve our services. Please take a few minutes to complete the written survey that you may receive in the mail after your visit with us. Thank you!             Your Updated Medication List - Protect others around you: Learn how to safely use, store and throw away your medicines at www.disposemymeds.org.          This list is accurate as of 8/1/18  5:03 PM.  Always use your most recent med list.                   Brand Name Dispense Instructions for use Diagnosis    AMARYL 2 MG tablet   Generic drug:  glimepiride      Take 1 tablet (2 mg) by mouth 2 times daily        aspirin 81 MG tablet      Take 1 tablet by mouth daily.        blood glucose monitoring lancets     100 each    Use to test blood sugars 2 times daily or as directed.    Diabetes mellitus without complication (H)       blood glucose monitoring meter device kit    no brand specified    1 kit    Use to test blood sugar 2-4 times daily or as directed.    Increased glucose level       * blood glucose monitoring test strip    no brand specified    100 strip    Use to test blood sugars 2-4 times daily or as directed    Increased glucose level       * blood glucose monitoring test strip    no brand specified    100 strip    Use to test blood sugars 2 times daily or as directed    Diabetes mellitus without complication (H)       FISH OIL PO      Take 1 g by mouth daily        GINSENG PO      Take by mouth daily    Stress, Increased glucose level       losartan 25 MG tablet    COZAAR    90 tablet    Take 1 tablet (25 mg) by mouth daily    Benign essential hypertension, Screen for colon  cancer, Benign nodular prostatic hyperplasia without lower urinary tract symptoms, Skin lesion, Erectile dysfunction due to arterial insufficiency, Smoking, Screening for prostate cancer       metFORMIN 500 MG 24 hr tablet    GLUCOPHAGE-XR    60 tablet    Take 1 tablet (500 mg) by mouth 2 times daily (with meals)    Diabetes mellitus, type 2 (H)       metoprolol succinate 25 MG 24 hr tablet    TOPROL XL    90 tablet    Take 1 tablet (25 mg) by mouth daily    Benign essential hypertension, Screen for colon cancer, Benign nodular prostatic hyperplasia without lower urinary tract symptoms, Skin lesion       MULTIVITAMINS PO      Take 1 tablet by mouth daily.        nicotine polacrilex 2 MG gum    NICORETTE    880 each    Place 1 each (2 mg) inside cheek as needed for smoking cessation *CHEW APPROX. 20 PCS PER DAY AS DIRECTED    Smoking, Erectile dysfunction due to arterial insufficiency, Benign essential hypertension, Screen for colon cancer, Benign nodular prostatic hyperplasia without lower urinary tract symptoms, Skin lesion, Screening for prostate cancer       olopatadine 0.1 % ophthalmic solution    PATANOL    1 Bottle    Place 1 drop into both eyes 2 times daily    Allergic conjunctivitis, bilateral       psyllium 58.6 % Powd    METAMUCIL     Take 2 teaspoonful by mouth daily        sildenafil 100 MG tablet    VIAGRA    8 tablet    Take 0.5-1 tablets ( mg) by mouth daily as needed    Erectile dysfunction due to arterial insufficiency, Benign essential hypertension, Screen for colon cancer, Benign nodular prostatic hyperplasia without lower urinary tract symptoms, Skin lesion, Smoking, Screening for prostate cancer       * Notice:  This list has 2 medication(s) that are the same as other medications prescribed for you. Read the directions carefully, and ask your doctor or other care provider to review them with you.

## 2018-08-01 NOTE — MR AVS SNAPSHOT
After Visit Summary   8/1/2018    Sujata Valencia    MRN: [id number]           Patient Information     Date Of Birth          1955        Visit Information        Provider Department      8/1/2018 1:30 PM Irma Hutchinson RD [facility] Diabetes        Today's Diagnoses     Diabetes mellitus without complication (H)    -  1    Diabetes mellitus, type 2 (H)           Follow-ups after your visit        Your next 10 appointments already scheduled     Aug 08, 2018  2:35 PM CDT   (Arrive by 2:20 PM)   Return Visit with Wes Harris MD   [facility] ([facility])    [address] [place] [address]  4th [facility] [address] [phone]            Aug 22, 2018  5:00 PM CDT   (Arrive by 4:45 PM)   NEW DIABETES with Denise Ceja MD   [facility] Endocrinology ([facility])    [address] [place] [id number] [phone]              Who to contact     Please call your clinic at [phone] to:    Ask questions about your health    Make or cancel appointments    Discuss your medicines    Learn about your test results    Speak to your doctor            Additional Information About Your Visit        [employer] Information     [employer] gives you secure access to your electronic health record. If you see a primary care provider, you can also send messages to your care team and make appointments. If you have questions, please call your primary care clinic.  If you do not have a primary care provider, please call [phone] and they will assist you.      [employer] is an electronic gateway that provides easy, online access to your medical records. With [employer] you can request a clinic appointment, read your test results, renew a prescription or communicate with your care team.     To access your existing account, please contact your [facility] or call [phone] for  assistance.        Care EveryWhere ID     This is your Care EveryWhere ID. This could be used by other organizations to access your [facility] medical records  [id number]        Your Vitals Were     BMI (Body Mass Index)                   28.38 kg/m2            Blood Pressure from Last 3 Encounters:   06/28/18 126/60   06/15/18 127/78   05/09/18 128/81    Weight from Last 3 Encounters:   08/01/18 92.3 kg (203 lb 6.4 oz)   07/02/18 92.3 kg (203 lb 6.4 oz)   06/28/18 90.7 kg (200 lb)              We Performed the Following     DIABETES EDUCATION - Individual  []        Primary Care Provider Office Phone # Fax #    Wes Harris [phone] [address] [address]        Equal Access to Services     [facility] : Hadii aad charlette hadasho Soomaali, waaxda luqadaha, qaybta kaalmada adeeronyada, larry nye . So [facility] [phone].    ATENCIÓN: Si habla español, tiene a roberts disposición servicios gratuitos de asistencia lingüística. Malvin al [phone].    We comply with applicable federal civil rights laws and [place] laws. We do not discriminate on the basis of race, color, national origin, age, disability, sex, sexual orientation, or gender identity.            Thank you!     Thank you for choosing [facility] DIABETES  for your care. Our goal is always to provide you with excellent care. Hearing back from our patients is one way we can continue to improve our services. Please take a few minutes to complete the written survey that you may receive in the mail after your visit with us. Thank you!             Your Updated Medication List - Protect others around you: Learn how to safely use, store and throw away your medicines at www.disposemymeds.org.          This list is accurate as of 8/1/18  4:08 PM.  Always use your most recent med list.                   Brand Name Dispense Instructions for use Diagnosis    aspirin 81 MG tablet      Take 1  tablet by mouth daily.        blood glucose monitoring lancets     100 each    Use to test blood sugars 2 times daily or as directed.    Diabetes mellitus without complication (H)       blood glucose monitoring meter device kit    no brand specified    1 kit    Use to test blood sugar 2-4 times daily or as directed.    Increased glucose level       * blood glucose monitoring test strip    no brand specified    100 strip    Use to test blood sugars 2-4 times daily or as directed    Increased glucose level       * blood glucose monitoring test strip    no brand specified    100 strip    Use to test blood sugars 2 times daily or as directed    Diabetes mellitus without complication (H)       FISH OIL PO      Take 1 g by mouth daily        GINSENG PO      Take by mouth daily    Stress, Increased glucose level       glimepiride 4 MG tablet    AMARYL    60 tablet    Take 1 tablet (4 mg) by mouth 2 times daily    Increased glucose level       losartan 25 MG tablet    COZAAR    90 tablet    Take 1 tablet (25 mg) by mouth daily    Benign essential hypertension, Screen for colon cancer, Benign nodular prostatic hyperplasia without lower urinary tract symptoms, Skin lesion, Erectile dysfunction due to arterial insufficiency, Smoking, Screening for prostate cancer       metoprolol succinate 25 MG 24 hr tablet    TOPROL XL    90 tablet    Take 1 tablet (25 mg) by mouth daily    Benign essential hypertension, Screen for colon cancer, Benign nodular prostatic hyperplasia without lower urinary tract symptoms, Skin lesion       MULTIVITAMINS PO      Take 1 tablet by mouth daily.        nicotine polacrilex 2 MG gum    NICORETTE    880 each    Place 1 each (2 mg) inside cheek as needed for smoking cessation *CHEW APPROX. 20 PCS PER DAY AS DIRECTED    Smoking, Erectile dysfunction due to arterial insufficiency, Benign essential hypertension, Screen for colon cancer, Benign nodular prostatic hyperplasia without lower urinary tract  symptoms, Skin lesion, Screening for prostate cancer       olopatadine 0.1 % ophthalmic solution    PATANOL    1 Bottle    Place 1 drop into both eyes 2 times daily    Allergic conjunctivitis, bilateral       psyllium 58.6 % Powd    METAMUCIL     Take 2 teaspoonful by mouth daily        sildenafil 100 MG tablet    VIAGRA    8 tablet    Take 0.5-1 tablets ( mg) by mouth daily as needed    Erectile dysfunction due to arterial insufficiency, Benign essential hypertension, Screen for colon cancer, Benign nodular prostatic hyperplasia without lower urinary tract symptoms, Skin lesion, Smoking, Screening for prostate cancer       * Notice:  This list has 2 medication(s) that are the same as other medications prescribed for you. Read the directions carefully, and ask your doctor or other care provider to review them with you.

## 2018-08-01 NOTE — TELEPHONE ENCOUNTER
Melanie Harris  1. Could you please release the result for my abdominal scan? I am getting a bit worried. Please let me know if all is OK.  2. I just met with Susana at Endocrinology- she has stopped Glimepride (the daily tests show the readings are very low). Can you please prescribe Metformin instead- if you think that is OK?  Thank you in advance.  Best regards  Sujata Valencia

## 2018-08-01 NOTE — Clinical Note
Hi Dr. Harris,  Please see my note--I sent you an order for Metformin  mg BID, which is pending your OK.   I realize you have an appointment with him this next week regarding liver function.  He was quite anxious about dying in his sleep from Glimepiride and since his BG's were all in target range on 2 mg (he reduced the dose himself about 5 days ago), I just discontinued it until he can see you.  Instructed to call if he starts to see very high blood glucoses.  Please let me know if you would rather do something differently.  Thanks much.  Ana.

## 2018-08-01 NOTE — PROGRESS NOTES
"DIABETES EDUCATION NOTE:    Sujata Valencia presents today for education related to Type 2 diabetes    Patient is being treated with:  oral agents  He is accompanied by self    PATIENT CONCERNS RELATED TO DIABETES SELF MANAGEMENT:     \"I want to be put on Metformin.  I want to be put on Metformin right away.  My doctor friend tells me that I should never have been put on Glimiperide and that I could die in my sleep if I keep taking it.\"  States that since our last visit he has further cut the dose to 2 mg.     Explained to patient that Dr. Harris has to OK any new medications, and reminded patient that the reason Metformin was not prescribed to begin with was because of elevated liver enzymes.  Patient states that he knows his ALT has returned to normal, so wants Metformin prescribed post-haste.     States that he has significantly reduced his ETOH consumption and his BP has now returned to normal as well, and he wants to get off his BP med.      He has an appointment pending with Dr. Patel next week, and the results of his liver ultrasound will be reviewed with him then.      ASSESSMENT:    Current Diabetes Management per Patient:  Taking diabetes medications?   yes:     Diabetes Medication(s)     Sulfonylureas Sig    glimepiride (AMARYL) 2 MG tablet Take 1 tablet (2 mg) by mouth 2 times daily    glimepiride (AMARYL) 4 MG tablet Take 1 tablet (4 mg) by mouth 2 times daily        Monitoring  Patient glucose self monitoring as follows: two times daily.   BG meter: Contour Next  Meter--results reviewed by Irma Hutchinson RD-MASSIEL today.  His fasting and post meal blood glucoses are all in target range, and he is having some low blood glucoses (fasting).  None are significantly low, but we checked him in the office today and his reading was 70.        BG values are: In goal  Patient's most recent   Lab Results   Component Value Date    A1C 10.2 06/08/2018    is not meeting goal of <7.0    Hypoglycemia:   Patient " is at risk of hypoglycemia? Yes                          EDUCATION and INSTRUCTION PROVIDED AT THIS VISIT:       Reviewed patient's blood glucoses with him, and attempted to allay his anxiety about dying in his sleep.  Explained that oral hypoglycemic agents are unlikely to cause a life threatening drop in blood glucose.  However, since he is so anxious about this, and because his blood glucoses are all in target range on a very small dose of Glimepiride, and he has an appointment already set up with Dr. Harris for next week, I advised him to go ahead and stop the Glimepiride altogether and continue to monitor.  Advised that blood glucoses may run a little higher than he is used to but he should be OK until he is able to see Dr. Harris for decision about Metformin.  Order pended to Dr. Patel for MetforminXR 500 mg BID.  If OK, then will review instructions with the patient over the phone.      Patient-stated goal written and given to Sujata Valencia.  Verbalized and demonstrated understanding of instructions.     PLAN:  See patient instructions  AVS printed and given to patient    FOLLOW-UP:      By phone for now.  Patient declined to make an appointment as he is uncertain as to when he will be available for an appointment.    Time spent with patient at today's visit was 30 minutes.      Any diabetes medication dose changes were made via the CDE Protocol and Collaborative Practice Agreement with [facility] and  [facility].  A copy of this encounter was provided to patient's referring provider.

## 2018-08-01 NOTE — PROGRESS NOTES
"  Diabetes Self Management Training: Follow-up Visit    Sujata Valencia presents today for education and evaluation of glucose control related to Type 2 diabetes.    He is accompanied by self    Patient Problem List and Family Medical History reviewed for relevant medical history, current medical status, and diabetes risk factors.    Current Diabetes Management per Patient:  Taking diabetes medications?   yes:     Diabetes Medication(s)     Sulfonylureas Sig    glimepiride (AMARYL) 4 MG tablet Take 1 tablet (4 mg) by mouth 2 times daily          Past Diabetes Education: Newly diagnosed    Patient glucose self monitoring as follows:one- two times daily.   BG results: fasting glucose- 119, 115, 114, 113, 92, 93, 83 and post-breakfast glucose- 102, 101, 80, 75, 125, 111, 114, 95     Do you have any difficulty affording your medications or glucose monitoring supplies?  No           Vitals:  Wt 92.3 kg (203 lb 6.4 oz)  BMI 28.38 kg/m2  Estimated body mass index is 28.38 kg/(m^2) as calculated from the following:    Height as of 6/28/18: 1.803 m (5' 10.98\").    Weight as of this encounter: 92.3 kg (203 lb 6.4 oz).   Last 3 BP:   BP Readings from Last 3 Encounters:   06/28/18 126/60   06/15/18 127/78   05/09/18 128/81     History   Smoking Status     Former Smoker     Packs/day: 1.00     Years: 25.00     Start date: 1975     Quit date: 10/31/2001   Smokeless Tobacco     Never Used       Labs:  Lab Results   Component Value Date    A1C 10.2 06/08/2018     Lab Results   Component Value Date     06/20/2018     Lab Results   Component Value Date    LDL 95 06/08/2018     HDL Cholesterol   Date Value Ref Range Status   06/08/2018 51 >39 mg/dL Final   ]  GFR Estimate   Date Value Ref Range Status   06/20/2018 >90 >60 mL/min/1.7m2 Final     Comment:     Non  GFR Calc     GFR Estimate If Black   Date Value Ref Range Status   06/20/2018 >90 >60 mL/min/1.7m2 Final     Comment:      GFR Calc "     Lab Results   Component Value Date    CR 0.80 06/20/2018     No results found for: MICROALBUMIN    Nutrition Review:  Sujata returns for diabetes/nutrition education f/u. He brings his bg meter for review. He tells me he has reduced his glimiperide to 2 mg. He is wanting to get off it and start metformin.     Diet Recall:   Breakfast: tea with 2 biscuits and 2 tablespoons psylium husk before walking dog. When returns: RX bar from  [employer], banana, mixed fruit bowl, almonds, walnuts, black coffee  Lunch:large vegetable salad with grilled chicken or other meat, 1 slice rye bread or 1 cup quinoa/rice, fruit  PM snack:tea, 2 biscuits, peanuts, roasted chickpeas  Dinner:grileed chicken and vegetables, 1 whole wheat tortilla, chobani yogurt, ocassional dessert recently at birthday parties  Evening snack:none    Eats out in restaurants: about 2 times or less  times per month  Beverages: water, black coffee, tea with 2% milk  ETOH: Sujata tells me he used to drink 6 drinks/night prior to his diabetes diagnosis but has stopped this. He will occasionally have 2 drinks     Physical Activity:    Walks dog 1.5 miles/day plus yard work/gardening      Gave Sujata lots of positive feedback for making above lifestyle changes resulting in a 2 pound weight loss and improved bg control. Worked with Sujata to develop goals for continued lifestyle changes towards continued weight loss and improved diabetes control. Reviewed recommended amount of alcohol to consume if desired-no more than 2 drinks/occassion (12 oz beer/5oz wine/1.5 oz liquor). Reviewed low sodium diet, artificial sweeteners. Reviewed benefits of continued weight loss with regards to improved blood sugars/blood pressure/lipids. HIs weight goal is 190 pounds.       Education provided today on:  AADE Self-Care Behaviors:  Healthy Eating: carbohydrate counting, weight reduction, heart healthy diet, eating out, portion control and label  reading  Being Active: relationship to blood glucose, describe appropriate activity program and precautions to take    Pt verbalized understanding of concepts discussed and recommendations provided today.       ASSESSMENT: Sujata has lost 2 pounds since our last visit with a total weight loss of approximately 6 pounds since his diagnosis. His bg are well controlled on his current regimen however he does not want to take the glimiperide as he believes there are too many side effects. This has been reviewed with him with regards to hypoglycemia and how to treat however he does remain very anxious to sto this and start metformin. He has a follow up appointment with Dr. Harris in one week for discussion regarding this. Needs more ed, will f/u in one month.       PLAN:  Healthy diet review  Continued gradual weight loss towards goal of 190 pounds  Continue with current exercise  AVS printed and provided to patient today.    FOLLOW-UP:  One month    Time Spent: 60 minutes  Encounter Type: Individual    Any diabetes medication dose changes were made via the CDE Protocol and Collaborative Practice Agreement with the patient's referring provider. A copy of this encounter was shared with the provider.

## 2018-08-02 NOTE — TELEPHONE ENCOUNTER
(1) Liver U/s is stable and still shows hepatic steatosis (fattly liver)    (2) Recommend he keep his 8/8/2018 appt. With me and we can go over all the labs and discuss starting Metformin    Thanks,  CHICHO Harris    Pt called and is aware of results and appt on the 8th of August to see Dr Harris.  Kaycee Tomas RN 9:15 AM on 8/6/2018.

## 2018-08-08 ENCOUNTER — OFFICE VISIT (OUTPATIENT)
Dept: INTERNAL MEDICINE | Facility: CLINIC | Age: 63
End: 2018-08-08
Payer: COMMERCIAL

## 2018-08-08 VITALS
SYSTOLIC BLOOD PRESSURE: 123 MMHG | HEART RATE: 95 BPM | OXYGEN SATURATION: 98 % | BODY MASS INDEX: 27.93 KG/M2 | DIASTOLIC BLOOD PRESSURE: 76 MMHG | WEIGHT: 200.2 LBS

## 2018-08-08 DIAGNOSIS — R74.8 ELEVATED LIVER ENZYMES: ICD-10-CM

## 2018-08-08 DIAGNOSIS — R73.09 INCREASED GLUCOSE LEVEL: Primary | ICD-10-CM

## 2018-08-08 DIAGNOSIS — R73.09 INCREASED GLUCOSE LEVEL: ICD-10-CM

## 2018-08-08 DIAGNOSIS — L98.9 SKIN LESION: ICD-10-CM

## 2018-08-08 LAB
ALBUMIN SERPL-MCNC: 4 G/DL (ref 3.4–5)
ALBUMIN UR-MCNC: NEGATIVE MG/DL
ALP SERPL-CCNC: 87 U/L (ref 40–150)
ALT SERPL W P-5'-P-CCNC: 59 U/L (ref 0–70)
ANION GAP SERPL CALCULATED.3IONS-SCNC: 7 MMOL/L (ref 3–14)
APPEARANCE UR: CLEAR
AST SERPL W P-5'-P-CCNC: 30 U/L (ref 0–45)
BILIRUB DIRECT SERPL-MCNC: 0.1 MG/DL (ref 0–0.2)
BILIRUB SERPL-MCNC: 0.4 MG/DL (ref 0.2–1.3)
BILIRUB UR QL STRIP: NEGATIVE
BUN SERPL-MCNC: 14 MG/DL (ref 7–30)
CALCIUM SERPL-MCNC: 9.3 MG/DL (ref 8.5–10.1)
CHLORIDE SERPL-SCNC: 103 MMOL/L (ref 94–109)
CO2 SERPL-SCNC: 25 MMOL/L (ref 20–32)
COLOR UR AUTO: YELLOW
CREAT SERPL-MCNC: 0.84 MG/DL (ref 0.66–1.25)
GFR SERPL CREATININE-BSD FRML MDRD: >90 ML/MIN/1.7M2
GLUCOSE SERPL-MCNC: 100 MG/DL (ref 70–99)
GLUCOSE UR STRIP-MCNC: NEGATIVE MG/DL
HBA1C MFR BLD: 7.1 % (ref 0–5.6)
HGB UR QL STRIP: NEGATIVE
KETONES UR STRIP-MCNC: NEGATIVE MG/DL
LEUKOCYTE ESTERASE UR QL STRIP: NEGATIVE
MUCOUS THREADS #/AREA URNS LPF: PRESENT /LPF
NITRATE UR QL: NEGATIVE
PH UR STRIP: 7 PH (ref 5–7)
POTASSIUM SERPL-SCNC: 3.8 MMOL/L (ref 3.4–5.3)
PROT SERPL-MCNC: 7.9 G/DL (ref 6.8–8.8)
RBC #/AREA URNS AUTO: <1 /HPF (ref 0–2)
SODIUM SERPL-SCNC: 135 MMOL/L (ref 133–144)
SOURCE: ABNORMAL
SP GR UR STRIP: 1.01 (ref 1–1.03)
UROBILINOGEN UR STRIP-MCNC: 0 MG/DL (ref 0–2)
WBC #/AREA URNS AUTO: <1 /HPF (ref 0–5)

## 2018-08-08 RX ORDER — CLOTRIMAZOLE AND BETAMETHASONE DIPROPIONATE 10; .64 MG/G; MG/G
CREAM TOPICAL 2 TIMES DAILY
Qty: 15 G | Refills: 3 | Status: SHIPPED | OUTPATIENT
Start: 2018-08-08 | End: 2019-08-29

## 2018-08-08 ASSESSMENT — PAIN SCALES - GENERAL: PAINLEVEL: MODERATE PAIN (4)

## 2018-08-08 NOTE — NURSING NOTE
Chief Complaint   Patient presents with     Diabetes     Pt. here for follow up.       Aurora cMkeon LPN at 3:01 PM on 8/8/2018

## 2018-08-08 NOTE — MR AVS SNAPSHOT
After Visit Summary   8/8/2018    Sujata Valencia    MRN: [id number]           Patient Information     Date Of Birth          1955        Visit Information        Provider Department      8/8/2018 2:35 PM Wes Harris MD [facility]        Today's Diagnoses     Increased glucose level    -  1    Elevated liver enzymes        Skin lesion          Care Instructions    Primary Care Center Phone Number [phone]  Primary Care Center Medication Refill Request Information:  * Please contact your pharmacy regarding ANY request for medication refills.  ** [facility] Prescription Fax = [phone]  * Please allow 3 business days for routine medication refills.  * Please allow 5 business days for controlled substance medication refills.     Primary Care Center Test Result notification information:  *You will be notified with in 7-10 days of your appointment day regarding the results of your test.  If you are on MyChart you will be notified as soon as the provider has reviewed the results and signed off on them.                 [facility]         Internal Medicine Resident                   Continuity Clinic    Who We Are    Resident Continuity Clinic is a part of the [facility].  Resident physicians see patients independently and establish a relationship with them over the course of their three-year residency program.  As with the Primary Care Clinic, our Resident Continuity Clinic models a group practice.  If your doctor is not available, you will be able to see another resident physician.  At the end of a resident s training, patients will be transitioned to a new resident physician for ongoing care.     We treat patients with a wide array of medical needs from routine physicals, to acute illnesses, to diabetes and blood pressure management, to complex medical illness.  What is a Resident Physician?    Resident physicians hold medical degrees and are  doctors. They are training to become specialists in Internal Medicine. They work under the supervision of board-certified faculty physicians.  Expectations for Your Care    We strive to provide accessible, quality care at all times.    In order to provide this care, it is best to see your primary care resident doctor consistently rather switching between providers.  In the event you do see another physician, you should schedule a follow-up visit with your usual primary care doctor.    If you are transitioning your care from another clinic, it is helpful to have your records available for your doctor to review.    We do not prescribe controlled substances, such as ADD medications or narcotic pain medications, on your first visit.  We will review your health records and concerns prior to devising a treatment plan with you in order to provide the best care.      Clinic Services     Extended clinic hours; patient  to help navigate your visit;  parking; laboratory and imaging services with evening and weekend hours    Multiple medical and surgical specialties in one building    Complementary services, including Nutrition, Integrative Medicine, Pharmacy consultations, Mental and Behavioral Health, Sports Medicine and Physical Therapy    Thank You    We would like to thank you for choosing the [facility] Internal Medicine Resident Continuity Clinic for your primary care. You are making a priceless contribution to the training of the next generation of health care practitioners.     Contact us at [phone] for appointments or questions.    Resident Clinic Hours are Tuesdays and Thursdays, 7:30am-5:00pm    Residents  Melina Garcia MD   (Female )   Rachele Munoz MD   (Female)   Grady Calvert MD  (Male)   Ender Messer MD  (Male)   Jenny Abernathy MD   (Female)   Sebastián Rowland MD  (Male)    Louie Mercado MD  (Male)   Orlin Darden MD  (Male)   Ender Arita MD (Male)   Estevan  MD Remigio  (Male)   Louise Corley MD (Female)    Rae Posadas MD (Female)   Radha Finn MD  (Male)   Mohini Umana MD(Female)   Alyce Samaniego MD  (Female)    Supervising Physicians   MD Gabrielle Hopper, MD Dusty Candelario, MD Shruthi Salinas MD Tanya Melnik, MD Charles Moldow, MD Heather Thompson Buum, MD Kathleen Watson, MD                        Follow-ups after your visit        Additional Services     DERMATOLOGY REFERRAL       Skin lesion                  Your next 10 appointments already scheduled     Aug 22, 2018  5:00 PM CDT   (Arrive by 4:45 PM)   NEW DIABETES with Denise Ceja MD   [facility] ([facility] [place])    [address] [place], [place] [id number] [phone]            Sep 05, 2018  3:35 PM CDT   (Arrive by 3:20 PM)   Return Visit with Wes Harris MD   [facility] ([facility] [place])    [address] [place] [id number] [phone]              Future tests that were ordered for you today     Open Future Orders        Priority Expected Expires Ordered    Basic metabolic panel Routine 8/8/2018 8/22/2018 8/8/2018    UA with Micro reflex to Culture Routine 8/8/2018 8/8/2019 8/8/2018    Hepatic panel Routine 8/8/2018 8/22/2018 8/8/2018    Hemoglobin A1c Routine 8/8/2018 8/22/2018 8/8/2018            Who to contact     Please call your clinic at [phone] to:    Ask questions about your health    Make or cancel appointments    Discuss your medicines    Learn about your test results    Speak to your doctor            Additional Information About Your Visit        [employer] Information     [employer] gives you secure access to your electronic health record. If you see a primary care provider, you can also send messages to your care team and make appointments. If you have questions, please call your primary  [facility] clinic.  If you do not have a primary care provider, please call [phone] and they will assist you.      [employer] is an electronic gateway that provides easy, online access to your medical records. With [employer], you can request a clinic appointment, read your test results, renew a prescription or communicate with your care team.     To access your existing account, please contact your [facility] or call [phone] for assistance.        Care EveryWhere ID     This is your Care EveryWhere ID. This could be used by other organizations to access your [place] medical records  [id number]        Your Vitals Were     Pulse Pulse Oximetry BMI (Body Mass Index)             95 98% 27.93 kg/m2          Blood Pressure from Last 3 Encounters:   08/08/18 123/76   06/28/18 126/60   06/15/18 127/78    Weight from Last 3 Encounters:   08/08/18 90.8 kg (200 lb 3.2 oz)   08/01/18 92.3 kg (203 lb 6.4 oz)   07/02/18 92.3 kg (203 lb 6.4 oz)              We Performed the Following     DERMATOLOGY REFERRAL          Today's Medication Changes          These changes are accurate as of 8/8/18  4:04 PM.  If you have any questions, ask your nurse or doctor.               Start taking these medicines.        Dose/Directions    clotrimazole-betamethasone cream   Commonly known as:  LOTRISONE   Used for:  Skin lesion   Started by:  Wes Harris MD        Apply topically 2 times daily   Quantity:  15 g   Refills:  3            Where to get your medicines      These medications were sent to [place] [address] [place] [facility] [address]  [address] [facility] [address]     Phone:  [phone]     clotrimazole-betamethasone cream                Primary Care Provider Office Phone # Fax #    Wes Harris [phone]       [address] [address]        Equal Access to Services     FRENCH MCELROY AH: Camden Camara, mack bob  larry salinaseron nye ah. Beatrice [facility] [phone].    ATENCIÓN: Si adriana shirley, tiene a roberts disposición servicios gratuitos de asistencia lingüística. Malvin al [phone].    We comply with applicable federal civil rights laws and [place] laws. We do not discriminate on the basis of race, color, national origin, age, disability, sex, sexual orientation, or gender identity.            Thank you!     Thank you for choosing [facility]  for your care. Our goal is always to provide you with excellent care. Hearing back from our patients is one way we can continue to improve our services. Please take a few minutes to complete the written survey that you may receive in the mail after your visit with us. Thank you!             Your Updated Medication List - Protect others around you: Learn how to safely use, store and throw away your medicines at www.disposemymeds.org.          This list is accurate as of 8/8/18  4:04 PM.  Always use your most recent med list.                   Brand Name Dispense Instructions for use Diagnosis    AMARYL 2 MG tablet   Generic drug:  glimepiride      Take 1 tablet (2 mg) by mouth 2 times daily        aspirin 81 MG tablet      Take 1 tablet by mouth daily.        blood glucose monitoring lancets     100 each    Use to test blood sugars 2 times daily or as directed.    Diabetes mellitus without complication (H)       blood glucose monitoring meter device kit    no brand specified    1 kit    Use to test blood sugar 2-4 times daily or as directed.    Increased glucose level       * blood glucose monitoring test strip    no brand specified    100 strip    Use to test blood sugars 2-4 times daily or as directed    Increased glucose level       * blood glucose monitoring test strip    no brand specified    100 strip    Use to test blood sugars 2 times daily or as directed    Diabetes mellitus without complication (H)        clotrimazole-betamethasone cream    LOTRISONE    15 g    Apply topically 2 times daily    Skin lesion       FISH OIL PO      Take 1 g by mouth daily        GINSENG PO      Take by mouth daily    Stress, Increased glucose level       losartan 25 MG tablet    COZAAR    90 tablet    Take 1 tablet (25 mg) by mouth daily    Benign essential hypertension, Screen for colon cancer, Benign nodular prostatic hyperplasia without lower urinary tract symptoms, Skin lesion, Erectile dysfunction due to arterial insufficiency, Smoking, Screening for prostate cancer       metFORMIN 500 MG 24 hr tablet    GLUCOPHAGE-XR    60 tablet    Take 1 tablet (500 mg) by mouth 2 times daily (with meals)    Diabetes mellitus, type 2 (H)       metoprolol succinate 25 MG 24 hr tablet    TOPROL XL    90 tablet    Take 1 tablet (25 mg) by mouth daily    Benign essential hypertension, Screen for colon cancer, Benign nodular prostatic hyperplasia without lower urinary tract symptoms, Skin lesion       MULTIVITAMINS PO      Take 1 tablet by mouth daily.        nicotine polacrilex 2 MG gum    NICORETTE    880 each    Place 1 each (2 mg) inside cheek as needed for smoking cessation *CHEW APPROX. 20 PCS PER DAY AS DIRECTED    Smoking, Erectile dysfunction due to arterial insufficiency, Benign essential hypertension, Screen for colon cancer, Benign nodular prostatic hyperplasia without lower urinary tract symptoms, Skin lesion, Screening for prostate cancer       olopatadine 0.1 % ophthalmic solution    PATANOL    1 Bottle    Place 1 drop into both eyes 2 times daily    Allergic conjunctivitis, bilateral       psyllium 58.6 % Powd    METAMUCIL     Take 2 teaspoonful by mouth daily        sildenafil 100 MG tablet    VIAGRA    8 tablet    Take 0.5-1 tablets ( mg) by mouth daily as needed    Erectile dysfunction due to arterial insufficiency, Benign essential hypertension, Screen for colon cancer, Benign nodular prostatic hyperplasia without lower  urinary tract symptoms, Skin lesion, Smoking, Screening for prostate cancer       * Notice:  This list has 2 medication(s) that are the same as other medications prescribed for you. Read the directions carefully, and ask your doctor or other care provider to review them with you.

## 2018-08-08 NOTE — PATIENT INSTRUCTIONS
Primary Care Center Phone Number [phone]  Primary Care Center Medication Refill Request Information:  * Please contact your pharmacy regarding ANY request for medication refills.  ** [facility] Prescription Fax = [phone]  * Please allow 3 business days for routine medication refills.  * Please allow 5 business days for controlled substance medication refills.     Primary Care Center Test Result notification information:  *You will be notified with in 7-10 days of your appointment day regarding the results of your test.  If you are on MyChart you will be notified as soon as the provider has reviewed the results and signed off on them.                 [facility]         Internal Medicine Resident                   Continuity Clinic    Who We Are    Resident Continuity Clinic is a part of the [facility].  Resident physicians see patients independently and establish a relationship with them over the course of their three-year residency program.  As with the Primary Care Clinic, our Resident Continuity Clinic models a group practice.  If your doctor is not available, you will be able to see another resident physician.  At the end of a resident s training, patients will be transitioned to a new resident physician for ongoing care.     We treat patients with a wide array of medical needs from routine physicals, to acute illnesses, to diabetes and blood pressure management, to complex medical illness.  What is a Resident Physician?    Resident physicians hold medical degrees and are doctors. They are training to become specialists in Internal Medicine. They work under the supervision of board-certified faculty physicians.  Expectations for Your Care    We strive to provide accessible, quality care at all times.    In order to provide this care, it is best to see your primary care resident doctor consistently rather switching between providers.  In the event you do see another physician, you  should schedule a follow-up visit with your usual primary care doctor.    If you are transitioning your care from another clinic, it is helpful to have your records available for your doctor to review.    We do not prescribe controlled substances, such as ADD medications or narcotic pain medications, on your first visit.  We will review your health records and concerns prior to devising a treatment plan with you in order to provide the best care.      Clinic Services     Extended clinic hours; patient  to help navigate your visit;  parking; laboratory and imaging services with evening and weekend hours    Multiple medical and surgical specialties in one building    Complementary services, including Nutrition, Integrative Medicine, Pharmacy consultations, Mental and Behavioral Health, Sports Medicine and Physical Therapy    Thank You    We would like to thank you for choosing the [facility] Internal Medicine Resident Continuity Clinic for your primary care. You are making a priceless contribution to the training of the next generation of health care practitioners.     Contact us at [phone] for appointments or questions.    Resident Clinic Hours are Tuesdays and Thursdays, 7:30am-5:00pm    Residents  Melina Garcia MD   (Female )   Rachele Munoz MD   (Female)   Grady Calvert MD  (Male)   Ender Messer MD  (Male)   Jenny Abernathy MD   (Female)   Sebastián Rowland MD  (Male)    Louie Mercado MD  (Male)   Orlin Darden MD  (Male)   Ender Arita MD (Male)   Estevan Flood MD  (Male)   Louise Corley MD (Female)    Rae Posadas MD (Female)   Radha Finn MD  (Male)   oMhini Umana MD(Female)   Alyce Samaniego MD  (Female)    Supervising Physicians   MD Gabrielle Hopper MD Briar Duffy, MD James Langland, MD Mary Logeais, MD Tanya Melnik, MD Charles Moldow, MD Heather Thompson Buum, MD Kathleen Watson, MD

## 2018-08-08 NOTE — PROGRESS NOTES
Chief Complaint   Sujata Valencia is a 62 year old male presents for   Chief Complaint   Patient presents with     Diabetes     Pt. here for follow up.      SUBJECTIVE:  Sujata Valencia is a 63 yo M with hx of cardiomyopathy and recent diagnosis of DM who presents for management of his diabetes. He feels that he does not need his HTN or diabetes medications. He feels these have over treated him and describes having issues keeping his sugars WNL and feeling run down on his losartan. He reports in June he cut down on his alcohol from 28 drinks per week to 4 drinks per week. Since that point his medications have made him feel worse. He acknowledges his DM and HTN may have been brought on by his drinking. Does have some new numbness in his left calve with walking that started two weeks ago and is relieved with rest. Increased urination recently, but has been drinking more water per wife. He has a spot on his back which has been itchy, relieved with steroid cream. He is not interested in getting this likely SK removed at this time.     Medications and allergies were reviewed by me today.     Social History   History   Smoking Status     Former Smoker     Packs/day: 1.00     Years: 25.00     Start date: 1975     Quit date: 10/31/2001   Smokeless Tobacco     Never Used      PHQ-2 ( 1999 Pf[employer]) 7/28/2014 10/31/2011   Q1: Little interest or pleasure in doing things 1 0   Q2: Feeling down, depressed or hopeless 1 0   PHQ-2 Score 2 0     No flowsheet data found.    Past Medical History   Patient Active Problem List   Diagnosis     Abdominal pain     Dry eye syndrome     Presbyopia     Hyperlipidemia     Abnormal glucose     Subclinical hypothyroidism     Chest pain     Adjustment disorder with anxiety     Sibling relational problem     Myocarditis (H)     Neoplasm of uncertain behavior of skin     Diabetes mellitus, type 2 (H)       Review of Systems   5 point ROS completed and negative except noted above, including Gen,  CV, Resp, GI, MS    Physical Exam   /76  Pulse 95  Wt 90.8 kg (200 lb 3.2 oz)  SpO2 98%  BMI 27.93 kg/m2  Gen: Elderly male, no distress, comfortable, pleasant   Eyes: anicteric, normal extra-ocular movements   Cardiovascular: regular rate and rhythm, normal S1 and S2, no murmurs, rubs or gallops, peripheral pulses full and symmetric   Respiratory: clear to auscultation, no wheezes or crackles, normal breath sounds   Skin: multiple subherroic keratosis present on back, midline back SK with evidence of friction, no jaundice   Psychological: appropriate mood     Assessment & Plan    63 yo male here for diabetes follow-up. He reports significantly cutting down on his EtOH intake(previously 28/wk, now 4/wk). This may explain how he has maintained sugars in low 100's without medications and keep his BP WNL. Will obtain labs to further determine how his sugar control is overall.     Increased glucose level  - Hemoglobin A1c  - Basic metabolic panel  - UA     Elevated liver enzymes  - Hepatic panel    Skin lesion - sub keratosis with evidence of friction from itching   - DERMATOLOGY REFERRAL      HM:  No HM at this visit     RTC: Return in about 4 weeks (around 9/5/2018).    Nikko Nuñez, MS4      Staff note; I personally interviewed pt. And conducted physical examination. I agree with above assessment and plan      CHICHO Harris MD    Total time spent 25 minutes.  More than 50% of the time spent with Mr. Valencia on counseling / coordinating his care

## 2018-08-22 ENCOUNTER — OFFICE VISIT (OUTPATIENT)
Dept: ENDOCRINOLOGY | Facility: CLINIC | Age: 63
End: 2018-08-22
Payer: COMMERCIAL

## 2018-08-22 VITALS
HEART RATE: 98 BPM | SYSTOLIC BLOOD PRESSURE: 127 MMHG | DIASTOLIC BLOOD PRESSURE: 86 MMHG | WEIGHT: 201.1 LBS | HEIGHT: 72 IN | BODY MASS INDEX: 27.24 KG/M2

## 2018-08-22 DIAGNOSIS — E11.65 TYPE 2 DIABETES MELLITUS WITH HYPERGLYCEMIA, WITHOUT LONG-TERM CURRENT USE OF INSULIN (H): ICD-10-CM

## 2018-08-22 RX ORDER — METFORMIN HCL 500 MG
500 TABLET, EXTENDED RELEASE 24 HR ORAL
Qty: 90 TABLET | Refills: 3 | Status: SHIPPED | OUTPATIENT
Start: 2018-08-22 | End: 2019-06-12

## 2018-08-22 NOTE — PATIENT INSTRUCTIONS
Please take metformin 500 mg XR daily    Please check your blood sugars when you are fasting in the morning, and before a meal and 2 hours after a meal and at bedtime and log carefully into a notebook and bring the notebook to all of your visits    Fasting goals are . 2 hours after eating goals are: <180

## 2018-08-22 NOTE — MR AVS SNAPSHOT
After Visit Summary   8/22/2018    Sujata Valencia    MRN: [id number]           Patient Information     Date Of Birth          1955        Visit Information        Provider Department      8/22/2018 5:00 PM Denise Ceja MD [facility] Endocrinology        Today's Diagnoses     Type 2 diabetes mellitus with hyperglycemia, without long-term current use of insulin (H)          Care Instructions    Please take metformin 500 mg XR daily    Please check your blood sugars when you are fasting in the morning, and before a meal and 2 hours after a meal and at bedtime and log carefully into a notebook and bring the notebook to all of your visits    Fasting goals are . 2 hours after eating goals are: <180            Follow-ups after your visit        Follow-up notes from your care team     Return if symptoms worsen or fail to improve.      Your next 10 appointments already scheduled     Sep 05, 2018  3:35 PM CDT   (Arrive by 3:20 PM)   Return Visit with Wes Harris MD   [facility] ([facility])    [address] [place] [id number] [phone]              Who to contact     Please call your clinic at [phone] to:    Ask questions about your health    Make or cancel appointments    Discuss your medicines    Learn about your test results    Speak to your doctor            Additional Information About Your Visit        [employer] Information     [employer] gives you secure access to your electronic health record. If you see a primary care provider, you can also send messages to your care team and make appointments. If you have questions, please call your primary care clinic.  If you do not have a primary care provider, please call [phone] and they will assist you.      [employer] is an electronic gateway that provides easy, online access to your medical records. With [employer], you can request a clinic appointment, read  your test results, renew a prescription or communicate with your care team.     To access your existing account, please contact your [facility] or call [phone] for assistance.        Care EveryWhere ID     This is your Care EveryWhere ID. This could be used by other organizations to access your [facility] medical records  [id number]        Your Vitals Were     Pulse Height BMI (Body Mass Index)             98 1.829 m (6') 27.27 kg/m2          Blood Pressure from Last 3 Encounters:   08/22/18 127/86   08/08/18 123/76   06/28/18 126/60    Weight from Last 3 Encounters:   08/22/18 91.2 kg (201 lb 1.6 oz)   08/08/18 90.8 kg (200 lb 3.2 oz)   08/01/18 92.3 kg (203 lb 6.4 oz)              Today, you had the following     No orders found for display         Today's Medication Changes          These changes are accurate as of 8/22/18  6:15 PM.  If you have any questions, ask your nurse or doctor.               These medicines have changed or have updated prescriptions.        Dose/Directions    metFORMIN 500 MG 24 hr tablet   Commonly known as:  GLUCOPHAGE-XR   This may have changed:  when to take this   Used for:  Type 2 diabetes mellitus with hyperglycemia, without long-term current use of insulin (H)   Changed by:  Denise Ceja MD        Dose:  500 mg   Take 1 tablet (500 mg) by mouth daily (with dinner)   Quantity:  90 tablet   Refills:  3            Where to get your medicines      These medications were sent to [place] [address] [facility] [place] - [address]  [address] [facility] [address]     Phone:  [phone]     metFORMIN 500 MG 24 hr tablet                Primary Care Provider Office Phone # Fax #    Wes Harris [phone]       [address] [address]        Equal Access to Services     FRENCH MCELROY : Camden Camara, waaxda luqadaha, qaybta kaalcheri glez, larry bower.  So [facility] [phone].    ATENCIÓN: Si ignaciola fern, tiene a roberts disposición servicios gratuitos de asistencia lingüística. Malvin al [phone].    We comply with applicable federal civil rights laws and [place] laws. We do not discriminate on the basis of race, color, national origin, age, disability, sex, sexual orientation, or gender identity.            Thank you!     Thank you for choosing [facility]  for your care. Our goal is always to provide you with excellent care. Hearing back from our patients is one way we can continue to improve our services. Please take a few minutes to complete the written survey that you may receive in the mail after your visit with us. Thank you!             Your Updated Medication List - Protect others around you: Learn how to safely use, store and throw away your medicines at www.disposemymeds.org.          This list is accurate as of 8/22/18  6:15 PM.  Always use your most recent med list.                   Brand Name Dispense Instructions for use Diagnosis    aspirin 81 MG tablet      Take 1 tablet by mouth daily.        blood glucose monitoring lancets     100 each    Use to test blood sugars 2 times daily or as directed.    Diabetes mellitus without complication (H)       blood glucose monitoring meter device kit    no brand specified    1 kit    Use to test blood sugar 2-4 times daily or as directed.    Increased glucose level       * blood glucose monitoring test strip    no brand specified    100 strip    Use to test blood sugars 2-4 times daily or as directed    Increased glucose level       * blood glucose monitoring test strip    no brand specified    100 strip    Use to test blood sugars 2 times daily or as directed    Diabetes mellitus without complication (H)       clotrimazole-betamethasone cream    LOTRISONE    15 g    Apply topically 2 times daily    Skin lesion       FISH OIL PO      Take 1 g by mouth daily        GINSENG PO      Take by mouth daily     Stress, Increased glucose level       losartan 25 MG tablet    COZAAR    90 tablet    Take 1 tablet (25 mg) by mouth daily    Benign essential hypertension, Screen for colon cancer, Benign nodular prostatic hyperplasia without lower urinary tract symptoms, Skin lesion, Erectile dysfunction due to arterial insufficiency, Smoking, Screening for prostate cancer       metFORMIN 500 MG 24 hr tablet    GLUCOPHAGE-XR    90 tablet    Take 1 tablet (500 mg) by mouth daily (with dinner)    Type 2 diabetes mellitus with hyperglycemia, without long-term current use of insulin (H)       metoprolol succinate 25 MG 24 hr tablet    TOPROL XL    90 tablet    Take 1 tablet (25 mg) by mouth daily    Benign essential hypertension, Screen for colon cancer, Benign nodular prostatic hyperplasia without lower urinary tract symptoms, Skin lesion       MULTIVITAMINS PO      Take 1 tablet by mouth daily.        nicotine polacrilex 2 MG gum    NICORETTE    880 each    Place 1 each (2 mg) inside cheek as needed for smoking cessation *CHEW APPROX. 20 PCS PER DAY AS DIRECTED    Smoking, Erectile dysfunction due to arterial insufficiency, Benign essential hypertension, Screen for colon cancer, Benign nodular prostatic hyperplasia without lower urinary tract symptoms, Skin lesion, Screening for prostate cancer       olopatadine 0.1 % ophthalmic solution    PATANOL    1 Bottle    Place 1 drop into both eyes 2 times daily    Allergic conjunctivitis, bilateral       psyllium 58.6 % Powd    METAMUCIL     Take 2 teaspoonful by mouth daily        sildenafil 100 MG tablet    VIAGRA    8 tablet    Take 0.5-1 tablets ( mg) by mouth daily as needed    Erectile dysfunction due to arterial insufficiency, Benign essential hypertension, Screen for colon cancer, Benign nodular prostatic hyperplasia without lower urinary tract symptoms, Skin lesion, Smoking, Screening for prostate cancer       * Notice:  This list has 2 medication(s) that are the same as  other medications prescribed for you. Read the directions carefully, and ask your doctor or other care provider to review them with you.

## 2018-08-22 NOTE — NURSING NOTE
Chief Complaint   Patient presents with     Consult     Type II Diabetes      Nathalie Leroy, AMI

## 2018-08-22 NOTE — LETTER
8/22/2018     RE: Sujata Valencia  [address] [place] [place] [address]     Dear Colleague,    Thank you for referring your patient, Sujata Valencia, to the [facility]OLOGY at [facility]. Please see a copy of my visit note below.    Reason for visit/consult: Diabetes    Primary care provider: Wes Harris    HPI:  61 yo male seen for evaluation and management of type 2 diabetes. His A1c is 7.1% on 8/8/18. He wonders whether he should start metformin or not. He has reduced his alcohol drinking significantly and has noticed that his blood sugars are under much better control. He complains of excessive urination and is planning to see urology for this. He is otherwise feeling fine.  Glucose data is as follows: Average: 136, # values: 24, values above goal: 0, values within goal: 24, values below goal: 0, highest value: 164, lowest value: 99, standard deviation: 15.    Past Medical/Surgical History:  Past Medical History:   Diagnosis Date     Arthritis      Back pains, lower      Chest pain 7/28/2014     Hypertension      Myocarditis (H)      Other abnormal glucose 7/21/2011     Other and unspecified hyperlipidemia 7/21/2011     Subclinical hypothyroidism      Past Surgical History:   Procedure Laterality Date     COLONOSCOPY  1998    fistula      CORONARY ANGIOGRAPHY ADULT ORDER  07/29/2014    normal coronary arteries       Allergies:  Allergies   Allergen Reactions     Seasonal Allergies        Current Medications   Current Outpatient Prescriptions   Medication     aspirin 81 MG tablet     clotrimazole-betamethasone (LOTRISONE) cream     GINSENG PO     metFORMIN (GLUCOPHAGE-XR) 500 MG 24 hr tablet     Multiple Vitamin (MULTIVITAMINS PO)     nicotine polacrilex (NICORETTE) 2 MG gum     olopatadine (PATANOL) 0.1 % ophthalmic solution     Omega-3 Fatty Acids (FISH OIL PO)     psyllium (METAMUCIL) 58.6 % POWD     sildenafil (VIAGRA) 100 MG tablet     blood glucose  monitoring (NO BRAND SPECIFIED) meter device kit     blood glucose monitoring (NO BRAND SPECIFIED) test strip     blood glucose monitoring (NO BRAND SPECIFIED) test strip     blood glucose monitoring (ONE TOUCH DELICA) lancets     losartan (COZAAR) 25 MG tablet     metoprolol succinate (TOPROL XL) 25 MG 24 hr tablet     No current facility-administered medications for this visit.        Family History:  Family History   Problem Relation Age of Onset     Osteoporosis Mother      Diabetes Maternal Grandmother      Glaucoma No family hx of      Macular Degeneration No family hx of        Social History:  Social History   Substance Use Topics     Smoking status: Former Smoker     Packs/day: 1.00     Years: 25.00     Start date: 1975     Quit date: 10/31/2001     Smokeless tobacco: Never Used     Alcohol use Yes      Comment: scotch 2 times a week, wine 5 days per week at drs request     Exam  Blood pressure 127/86, pulse 98, height 1.829 m (6'), weight 91.2 kg (201 lb 1.6 oz).  Gen: well appearing, nad, pleasant and conversant  HEENT: anicteric, EOMI, no proptosis or lid lag, no goiter  Neuro:  No tremor    Labs/Imaging  As above. Creatinine normal. Sodiums normal.    Assessment and Plan  Type 2 Diabetes, good control    Patient Instructions:  Please take metformin 500 mg XR daily    Please check your blood sugars when you are fasting in the morning, and before a meal and 2 hours after a meal and at bedtime and log carefully into a notebook and bring the notebook to all of your visits    Fasting goals are . 2 hours after eating goals are: <180    RTC: CHIRAG Ceja MD, MPH  Attending Physician  Diabetes/Endocrinology/Metabolism    Time: 40 min spent on evaluation, management, counseling, education, & motivational interviewing with greater than 50% of the total time was spent on counseling and coordinating care    Again, thank you for allowing me to participate in the care of your patient.       Sincerely,    Denise Ceja MD

## 2018-09-04 NOTE — PROGRESS NOTES
Reason for visit/consult: Diabetes    Primary care provider: Wes Harris    HPI:  63 yo male seen for evaluation and management of type 2 diabetes. His A1c is 7.1% on 8/8/18. He wonders whether he should start metformin or not. He has reduced his alcohol drinking significantly and has noticed that his blood sugars are under much better control. He complains of excessive urination and is planning to see urology for this. He is otherwise feeling fine.  Glucose data is as follows: Average: 136, # values: 24, values above goal: 0, values within goal: 24, values below goal: 0, highest value: 164, lowest value: 99, standard deviation: 15.    Past Medical/Surgical History:  Past Medical History:   Diagnosis Date     Arthritis      Back pains, lower      Chest pain 7/28/2014     Hypertension      Myocarditis (H)      Other abnormal glucose 7/21/2011     Other and unspecified hyperlipidemia 7/21/2011     Subclinical hypothyroidism      Past Surgical History:   Procedure Laterality Date     COLONOSCOPY  1998    fistula      CORONARY ANGIOGRAPHY ADULT ORDER  07/29/2014    normal coronary arteries       Allergies:  Allergies   Allergen Reactions     Seasonal Allergies        Current Medications   Current Outpatient Prescriptions   Medication     aspirin 81 MG tablet     clotrimazole-betamethasone (LOTRISONE) cream     GINSENG PO     metFORMIN (GLUCOPHAGE-XR) 500 MG 24 hr tablet     Multiple Vitamin (MULTIVITAMINS PO)     nicotine polacrilex (NICORETTE) 2 MG gum     olopatadine (PATANOL) 0.1 % ophthalmic solution     Omega-3 Fatty Acids (FISH OIL PO)     psyllium (METAMUCIL) 58.6 % POWD     sildenafil (VIAGRA) 100 MG tablet     blood glucose monitoring (NO BRAND SPECIFIED) meter device kit     blood glucose monitoring (NO BRAND SPECIFIED) test strip     blood glucose monitoring (NO BRAND SPECIFIED) test strip     blood glucose monitoring (ONE TOUCH DELICA) lancets     losartan (COZAAR) 25 MG tablet     metoprolol succinate  (TOPROL XL) 25 MG 24 hr tablet     No current facility-administered medications for this visit.        Family History:  Family History   Problem Relation Age of Onset     Osteoporosis Mother      Diabetes Maternal Grandmother      Glaucoma No family hx of      Macular Degeneration No family hx of        Social History:  Social History   Substance Use Topics     Smoking status: Former Smoker     Packs/day: 1.00     Years: 25.00     Start date: 1975     Quit date: 10/31/2001     Smokeless tobacco: Never Used     Alcohol use Yes      Comment: scotch 2 times a week, wine 5 days per week at drs request     Exam  Blood pressure 127/86, pulse 98, height 1.829 m (6'), weight 91.2 kg (201 lb 1.6 oz).  Gen: well appearing, nad, pleasant and conversant  HEENT: anicteric, EOMI, no proptosis or lid lag, no goiter  Neuro:  No tremor    Labs/Imaging  As above. Creatinine normal. Sodiums normal.    Assessment and Plan  Type 2 Diabetes, good control    Patient Instructions:  Please take metformin 500 mg XR daily    Please check your blood sugars when you are fasting in the morning, and before a meal and 2 hours after a meal and at bedtime and log carefully into a notebook and bring the notebook to all of your visits    Fasting goals are . 2 hours after eating goals are: <180    RTC: CHIRAG Ceja MD, MPH  Attending Physician  Diabetes/Endocrinology/Metabolism    Time: 40 min spent on evaluation, management, counseling, education, & motivational interviewing with greater than 50% of the total time was spent on counseling and coordinating care

## 2018-09-05 ENCOUNTER — OFFICE VISIT (OUTPATIENT)
Dept: INTERNAL MEDICINE | Facility: CLINIC | Age: 63
End: 2018-09-05
Payer: COMMERCIAL

## 2018-09-05 VITALS
WEIGHT: 202.7 LBS | DIASTOLIC BLOOD PRESSURE: 80 MMHG | SYSTOLIC BLOOD PRESSURE: 125 MMHG | HEART RATE: 101 BPM | BODY MASS INDEX: 27.49 KG/M2

## 2018-09-05 DIAGNOSIS — M54.89 OTHER CHRONIC BACK PAIN: ICD-10-CM

## 2018-09-05 DIAGNOSIS — N40.0 BENIGN PROSTATIC HYPERPLASIA WITHOUT LOWER URINARY TRACT SYMPTOMS: Primary | ICD-10-CM

## 2018-09-05 DIAGNOSIS — G89.29 OTHER CHRONIC BACK PAIN: ICD-10-CM

## 2018-09-05 PROBLEM — E11.9 DIABETES MELLITUS, TYPE 2 (H): Status: RESOLVED | Noted: 2018-08-01 | Resolved: 2018-09-05

## 2018-09-05 PROBLEM — D48.5 NEOPLASM OF UNCERTAIN BEHAVIOR OF SKIN: Status: RESOLVED | Noted: 2017-05-18 | Resolved: 2018-09-05

## 2018-09-05 ASSESSMENT — PAIN SCALES - GENERAL: PAINLEVEL: MODERATE PAIN (4)

## 2018-09-05 NOTE — MR AVS SNAPSHOT
After Visit Summary   9/5/2018    Sujata Valencia    MRN: [id number]           Patient Information     Date Of Birth          1955        Visit Information        Provider Department      9/5/2018 3:35 PM Wes Harris MD [facility]        Today's Diagnoses     Benign prostatic hyperplasia without lower urinary tract symptoms    -  1    Other chronic back pain          Care Instructions    Please go to the lab and x-ray on the first floor before you leave today.     Urology [phone] ([facility] [place] 4th [place])              Follow-ups after your visit        Additional Services     UROLOGY ADULT REFERRAL       Possible BPH                  Follow-up notes from your care team     Return in about 2 months (around 11/5/2018).      Your next 10 appointments already scheduled     Nov 06, 2018 12:00 PM CST   (Arrive by 11:45 AM)   NEW BENIGN PROSTATIC HYPERTROPHY with Mendez Rivera MD   [facility] Prostate [facility] Urologic Cancers ([facility])    [address] [place] [address] [phone]            Nov 06, 2018  1:45 PM CST   XR LUMBAR SPINE 2/3 VIEWS with UCXR1   [facility] Xray ([facility])    [address] [place] [address] [phone]           Please bring a list of your current medicines to your exam. (Include vitamins, minerals and over-thecounter medicines.) Leave your valuables at home.  Tell your doctor if there is a chance you may be pregnant.  You do not need to do anything special for this exam.            Nov 06, 2018  1:55 PM CST   XR PELVIS AND HIP BILATERAL 2 VIEWS with UCXR1   [facility] Xray ([facility])    [address] [place] [address] [phone]           Please bring a list of your current medicines to your exam. (Include vitamins, minerals and  over-thecounter medicines.) Leave your valuables at home.  Tell your doctor if there is a chance you may be pregnant.  You do not need to do anything special for this exam.            Nov 09, 2018 11:35 AM CST   (Arrive by 11:20 AM)   Return Visit with Wes Harris MD   [facility] ([facility])    [address] [address] [phone]              Future tests that were ordered for you today     Open Future Orders        Priority Expected Expires Ordered    XR Lumbar Spine 2/3 Views Routine 9/5/2018 9/5/2019 9/5/2018    XR Pelvis and Hip Bilateral 2 Views Routine 9/5/2018 9/5/2019 9/5/2018            Who to contact     Please call your clinic at [phone] to:    Ask questions about your health    Make or cancel appointments    Discuss your medicines    Learn about your test results    Speak to your doctor            Additional Information About Your Visit        [facility] [employer] Information     [employer] [facility] gives you secure access to your electronic health record. If you see a primary care provider, you can also send messages to your care team and make appointments. If you have questions, please call your primary care clinic.  If you do not have a primary care provider, please call [phone] and they will assist you.      [employer] [facility] [employer] is an electronic gateway that provides easy, online access to your medical records. With [employer] [facility] [employer], you can request a clinic appointment, read your test results, renew a prescription or communicate with your care team.     To access your existing account, please contact your [facility] or call [phone] for assistance.        Care EveryWhere ID     This is your Care EveryWhere ID. This could be used by other organizations to access your [place] medical records  [id number]        Your Vitals Were     Pulse BMI (Body Mass Index)                101 27.49 kg/m2           Blood Pressure  from Last 3 Encounters:   09/05/18 125/80   08/22/18 127/86   08/08/18 123/76    Weight from Last 3 Encounters:   09/05/18 91.9 kg (202 lb 11.2 oz)   08/22/18 91.2 kg (201 lb 1.6 oz)   08/08/18 90.8 kg (200 lb 3.2 oz)              We Performed the Following     UROLOGY ADULT REFERRAL        Primary Care Provider Office Phone # Fax #    Wes Harris [phone] [address] [address]        Equal Access to Services     [facility]: Hadii aad ku hadasho Sokyleali, waaxda luqadaha, qaybta kaalmada adeegyada, larry nye . So [facility] [phone].    ATENCIÓN: Si habla español, tiene a roebrts disposición servicios gratuitos de asistencia lingüística. Llame al [phone].    We comply with applicable federal civil rights laws and [place] laws. We do not discriminate on the basis of race, color, national origin, age, disability, sex, sexual orientation, or gender identity.            Thank you!     Thank you for choosing [facility]  for your care. Our goal is always to provide you with excellent care. Hearing back from our patients is one way we can continue to improve our services. Please take a few minutes to complete the written survey that you may receive in the mail after your visit with us. Thank you!             Your Updated Medication List - Protect others around you: Learn how to safely use, store and throw away your medicines at www.disposemymeds.org.          This list is accurate as of 9/5/18  4:58 PM.  Always use your most recent med list.                   Brand Name Dispense Instructions for use Diagnosis    aspirin 81 MG tablet      Take 1 tablet by mouth daily.        blood glucose monitoring lancets     100 each    Use to test blood sugars 2 times daily or as directed.    Diabetes mellitus without complication (H)       blood glucose monitoring meter device kit    no brand specified    1 kit    Use to test  blood sugar 2-4 times daily or as directed.    Increased glucose level       * blood glucose monitoring test strip    no brand specified    100 strip    Use to test blood sugars 2-4 times daily or as directed    Increased glucose level       * blood glucose monitoring test strip    no brand specified    100 strip    Use to test blood sugars 2 times daily or as directed    Diabetes mellitus without complication (H)       clotrimazole-betamethasone cream    LOTRISONE    15 g    Apply topically 2 times daily    Skin lesion       FISH OIL PO      Take 1 g by mouth daily        GINSENG PO      Take by mouth daily    Stress, Increased glucose level       losartan 25 MG tablet    COZAAR    90 tablet    Take 1 tablet (25 mg) by mouth daily    Benign essential hypertension, Screen for colon cancer, Benign nodular prostatic hyperplasia without lower urinary tract symptoms, Skin lesion, Erectile dysfunction due to arterial insufficiency, Smoking, Screening for prostate cancer       metFORMIN 500 MG 24 hr tablet    GLUCOPHAGE-XR    90 tablet    Take 1 tablet (500 mg) by mouth daily (with dinner)    Type 2 diabetes mellitus with hyperglycemia, without long-term current use of insulin (H)       metoprolol succinate 25 MG 24 hr tablet    TOPROL XL    90 tablet    Take 1 tablet (25 mg) by mouth daily    Benign essential hypertension, Screen for colon cancer, Benign nodular prostatic hyperplasia without lower urinary tract symptoms, Skin lesion       MULTIVITAMINS PO      Take 1 tablet by mouth daily.        nicotine polacrilex 2 MG gum    NICORETTE    880 each    Place 1 each (2 mg) inside cheek as needed for smoking cessation *CHEW APPROX. 20 PCS PER DAY AS DIRECTED    Smoking, Erectile dysfunction due to arterial insufficiency, Benign essential hypertension, Screen for colon cancer, Benign nodular prostatic hyperplasia without lower urinary tract symptoms, Skin lesion, Screening for prostate cancer       olopatadine 0.1 %  ophthalmic solution    PATANOL    1 Bottle    Place 1 drop into both eyes 2 times daily    Allergic conjunctivitis, bilateral       psyllium 58.6 % Powd    METAMUCIL     Take 2 teaspoonful by mouth daily        sildenafil 100 MG tablet    VIAGRA    8 tablet    Take 0.5-1 tablets ( mg) by mouth daily as needed    Erectile dysfunction due to arterial insufficiency, Benign essential hypertension, Screen for colon cancer, Benign nodular prostatic hyperplasia without lower urinary tract symptoms, Skin lesion, Smoking, Screening for prostate cancer       * Notice:  This list has 2 medication(s) that are the same as other medications prescribed for you. Read the directions carefully, and ask your doctor or other care provider to review them with you.

## 2018-09-05 NOTE — PROGRESS NOTES
HPI:    Pt. Comes in for follow up today. Overall doing well. He has stopped drinking EtOH. He is exercising more. He is checking his Blood sugars and BP. He has some mild urinary sxs. He has some mild lower back pain. O/w no additional HEENT, cardiopulmonary, abdominal, , neurological complaints.    PE:    Vitals noted gen nad cooperative alert, HEENT oropharynx clear neck supple nl rom, LCTA, B, good air movement, RRR, S1, S2,. No MRG, no acute findings abdominal exam. No tenderness to palpation of lower back    A/P:    1. PSA checked at 0.30 6/8/2018. Will refer to Urology for possible BPH  2. H/o myocarditis; see by Dr. Rich, Cardiology 6/28/2018; he had resting echo 6/27/2018: EF 45-50% with mild global hypokinesis  3. EtOH use/abuse; he states he has stopped drinking  4. Fatty liver; abdominal U/S 7/27/18 stable and seen in GI 7/2017; liver tests normal 8/8/2018  5. DM2; A1c 7.1% 8/8/2018 (down from 10.2% 6/8/2018). Seen by Dr. Ceja, Endo 8/22/2018 and he remains on Metformin  6. Colonoscopy 5/2017 repeat in 3 years  7. Seen in Dermatology 5/2017  8. Ordered plain films lumbar spine and pelvis for back pain  9. Check with insurance regarding new Shingles vaccine    I will see him back 11/2018    Total time spent 25 minutes.  More than 50% of the time spent with Mr. Valencia on counseling / coordinating his care

## 2018-09-05 NOTE — NURSING NOTE
Chief Complaint   Patient presents with     Recheck Medication     follow up and talk about diabetes meds     Urinary Problem     excessive urination     Back Pain       Jae Desai, EMT

## 2018-09-06 ENCOUNTER — PRE VISIT (OUTPATIENT)
Dept: UROLOGY | Facility: CLINIC | Age: 63
End: 2018-09-06

## 2018-09-06 NOTE — TELEPHONE ENCOUNTER
MEDICAL RECORDS REQUEST   [place] [facility]  Urology Clinic  [address] [place] [address]  PHONE: [phone]  Fax: [phone]        FUTURE VISIT INFORMATION                                                   Sujata Valencia, : 1955 scheduled for future visit at [facility]    APPOINTMENT INFORMATION:    Date: 2018    Provider:  Mendez Rivera    Reason for Visit/Diagnosis: BPH    REFERRAL INFORMATION:    Referring provider:  Wes Harris    Specialty: MD    Referring providers clinic:  [facility]    Clinic contact number:  [phone]    RECORDS REQUESTED FOR VISIT                                                     NOTES  STATUS/DETAILS   OFFICE NOTE from referring provider  yes   OFFICE NOTE from other specialist  no   DISCHARGE SUMMARY from hospital  no   DISCHARGE REPORT from the ER  no   OPERATIVE REPORT  no   MEDICATION LIST  yes       PRE-VISIT CHECKLIST      Record collection complete Yes   Appointment appropriately scheduled           (right time/right provider) Yes   MyChart activation Yes   Questionnaire complete If no, please explain in process     Completed by: Malika Perry

## 2018-11-09 ENCOUNTER — OFFICE VISIT (OUTPATIENT)
Dept: INTERNAL MEDICINE | Facility: CLINIC | Age: 63
End: 2018-11-09
Payer: COMMERCIAL

## 2018-11-09 VITALS
OXYGEN SATURATION: 97 % | BODY MASS INDEX: 27.29 KG/M2 | WEIGHT: 201.2 LBS | HEART RATE: 88 BPM | SYSTOLIC BLOOD PRESSURE: 122 MMHG | DIASTOLIC BLOOD PRESSURE: 77 MMHG

## 2018-11-09 DIAGNOSIS — Z23 NEED FOR VACCINATION: ICD-10-CM

## 2018-11-09 DIAGNOSIS — R73.09 INCREASED GLUCOSE LEVEL: ICD-10-CM

## 2018-11-09 DIAGNOSIS — Z23 NEED FOR PROPHYLACTIC VACCINATION AND INOCULATION AGAINST INFLUENZA: Primary | ICD-10-CM

## 2018-11-09 DIAGNOSIS — M54.89 OTHER ACUTE BACK PAIN: ICD-10-CM

## 2018-11-09 ASSESSMENT — PAIN SCALES - GENERAL: PAINLEVEL: NO PAIN (0)

## 2018-11-09 NOTE — NURSING NOTE
Chief Complaint   Patient presents with     Recheck Medication     here to follow up        Jae Desai, EMT 11:57 AM on 11/9/2018.

## 2018-11-09 NOTE — PATIENT INSTRUCTIONS
[facility] Medication Refill Request Information:  * Please contact your pharmacy regarding ANY request for medication refills.  ** [facility] Prescription Fax = [phone]  * Please allow 3 business days for routine medication refills.  * Please allow 5 business days for controlled substance medication refills.     [facility] Test Result notification information:  *You will be notified with in 7-10 days of your appointment day regarding the results of your test.  If you are on MyChart you will be notified as soon as the provider has reviewed the results and signed off on them.    [facility]: [phone]

## 2018-11-09 NOTE — NURSING NOTE
Sujata Valencia      1.  Has the patient received the information for the influenza vaccine? YES    2.  Does the patient have any of the following contraindications?     Allergy to eggs? No     Allergic reaction to previous influenza vaccines? No     Any other problems to previous influenza vaccines? No     Paralyzed by Guillain-Pfeifer syndrome? No     Currently pregnant? NO     Current moderate or severe illness? No     Allergy to contact lens solution? No    3.  The vaccine has been administered in the usual fashion and the patient was instructed to wait 20 minutes before leaving the building in the event of an allergic reaction: YES    Recorded by Jae Desai

## 2018-11-09 NOTE — MR AVS SNAPSHOT
After Visit Summary   11/9/2018    Sujata Valencia    MRN: [id number]           Patient Information     Date Of Birth          1955        Visit Information        Provider Department      11/9/2018 11:35 AM Wes Harris MD [facility]        Today's Diagnoses     Need for prophylactic vaccination and inoculation against influenza    -  1    Increased glucose level        Other acute back pain        Need for vaccination          Care Instructions    Primary Care Center Medication Refill Request Information:  * Please contact your pharmacy regarding ANY request for medication refills.  ** [facility] Prescription Fax = [phone]  * Please allow 3 business days for routine medication refills.  * Please allow 5 business days for controlled substance medication refills.     Primary Care Center Test Result notification information:  *You will be notified with in 7-10 days of your appointment day regarding the results of your test.  If you are on MyChart you will be notified as soon as the provider has reviewed the results and signed off on them.    [facility]: [phone]             Follow-ups after your visit        Additional Services     ORTHOPEDICS ADULT REFERRAL       Dr. Daigle hip and back pain                  Your next 10 appointments already scheduled     Nov 15, 2018 12:00 PM CST   (Arrive by 11:45 AM)   New Patient Visit with Adan Daigle MD   [facility] ([facility])    [address] [place] [address]  [address] [facility] [address] [phone]            Dec 10, 2018 11:30 AM CST   LAB with  LAB   [facility] ([facility])    [address] [place] [address]  [address]st [facility] [address] [phone]           Please do not eat 10-12 hours before your appointment if you are coming in fasting for labs on lipids, cholesterol, or glucose (sugar). This does not apply to pregnant women.  Water, hot tea and black coffee (with nothing added) are okay. Do not drink other fluids, diet soda or chew gum.            Dec 10, 2018 12:35 PM CST   (Arrive by 12:20 PM)   Return Visit with Wes Harris MD   [facility] ([facility])    [address] [id number] [phone]              Future tests that were ordered for you today     Open Future Orders        Priority Expected Expires Ordered    Hemoglobin A1c Routine 11/9/2018 11/23/2018 11/9/2018    CBC with platelets differential Routine 11/9/2018 11/23/2018 11/9/2018    Comprehensive metabolic panel Routine 11/9/2018 11/23/2018 11/9/2018            Who to contact     Please call your clinic at [phone] to:    Ask questions about your health    Make or cancel appointments    Discuss your medicines    Learn about your test results    Speak to your doctor            Additional Information About Your Visit        [employer] Information     [employer] gives you secure access to your electronic health record. If you see a primary care provider, you can also send messages to your care team and make appointments. If you have questions, please call your primary care clinic.  If you do not have a primary care provider, please call [phone] and they will assist you.      [employer] is an electronic gateway that provides easy, online access to your medical records. With [employer], you can request a clinic appointment, read your test results, renew a prescription or communicate with your care team.     To access your existing account, please contact your [facility] or call [phone] for assistance.        Care EveryWhere ID     This is your Care EveryWhere ID. This could be used by other organizations to access your [place] medical records  [id number]        Your Vitals Were     Pulse Pulse Oximetry BMI (Body Mass Index)             88 97% 27.29 kg/m2           Blood Pressure from Last 3 Encounters:   11/09/18 122/77   09/05/18 125/80   08/22/18 127/86    Weight from Last 3 Encounters:   11/09/18 91.3 kg (201 lb 3.2 oz)   09/05/18 91.9 kg (202 lb 11.2 oz)   08/22/18 91.2 kg (201 lb 1.6 oz)              We Performed the Following     FLU VACCINE HIGH DOSE PRESERVATIVE FREE, AGE =>65 YR     ORTHOPEDICS ADULT REFERRAL     Pneumococcal vaccine 13 valent PCV13 IM (Prevnar) [35709]        Primary Care Provider Office Phone # Fax #    Wes Harris [phone] [address] [address]        Equal Access to Services     FRENCH MCELROY : Camden franklino Sojuliet, waaxda luqadaha, qaybta kaalmada adeegyajamie, larry bower. So [facility] [phone].    ATENCIÓN: Si habla español, tiene a roberts disposición servicios gratuitos de asistencia lingüística. [facility] [phone].    We comply with applicable federal civil rights laws and [place] laws. We do not discriminate on the basis of race, color, national origin, age, disability, sex, sexual orientation, or gender identity.            Thank you!     Thank you for choosing [facility]  for your care. Our goal is always to provide you with excellent care. Hearing back from our patients is one way we can continue to improve our services. Please take a few minutes to complete the written survey that you may receive in the mail after your visit with us. Thank you!             Your Updated Medication List - Protect others around you: Learn how to safely use, store and throw away your medicines at www.disposemymeds.org.          This list is accurate as of 11/9/18 12:48 PM.  Always use your most recent med list.                   Brand Name Dispense Instructions for use Diagnosis    aspirin 81 MG tablet      Take 1 tablet by mouth daily.        blood glucose monitoring lancets     100 each    Use to test blood sugars 2 times daily or as directed.     Diabetes mellitus without complication (H)       blood glucose monitoring meter device kit    no brand specified    1 kit    Use to test blood sugar 2-4 times daily or as directed.    Increased glucose level       * blood glucose monitoring test strip    no brand specified    100 strip    Use to test blood sugars 2-4 times daily or as directed    Increased glucose level       * blood glucose monitoring test strip    no brand specified    100 strip    Use to test blood sugars 2 times daily or as directed    Diabetes mellitus without complication (H)       clotrimazole-betamethasone cream    LOTRISONE    15 g    Apply topically 2 times daily    Skin lesion       FISH OIL PO      Take 1 g by mouth daily        GINSENG PO      Take by mouth daily    Stress, Increased glucose level       losartan 25 MG tablet    COZAAR    90 tablet    Take 1 tablet (25 mg) by mouth daily    Benign essential hypertension, Screen for colon cancer, Benign nodular prostatic hyperplasia without lower urinary tract symptoms, Skin lesion, Erectile dysfunction due to arterial insufficiency, Smoking, Screening for prostate cancer       metFORMIN 500 MG 24 hr tablet    GLUCOPHAGE-XR    90 tablet    Take 1 tablet (500 mg) by mouth daily (with dinner)    Type 2 diabetes mellitus with hyperglycemia, without long-term current use of insulin (H)       metoprolol succinate 25 MG 24 hr tablet    TOPROL XL    90 tablet    Take 1 tablet (25 mg) by mouth daily    Benign essential hypertension, Screen for colon cancer, Benign nodular prostatic hyperplasia without lower urinary tract symptoms, Skin lesion       MULTIVITAMINS PO      Take 1 tablet by mouth daily.        nicotine polacrilex 2 MG gum    NICORETTE    880 each    Place 1 each (2 mg) inside cheek as needed for smoking cessation *CHEW APPROX. 20 PCS PER DAY AS DIRECTED    Smoking, Erectile dysfunction due to arterial insufficiency, Benign essential hypertension, Screen for colon cancer, Benign  nodular prostatic hyperplasia without lower urinary tract symptoms, Skin lesion, Screening for prostate cancer       olopatadine 0.1 % ophthalmic solution    PATANOL    1 Bottle    Place 1 drop into both eyes 2 times daily    Allergic conjunctivitis, bilateral       psyllium 58.6 % Powd    METAMUCIL     Take 2 teaspoonful by mouth daily        sildenafil 100 MG tablet    VIAGRA    8 tablet    Take 0.5-1 tablets ( mg) by mouth daily as needed    Erectile dysfunction due to arterial insufficiency, Benign essential hypertension, Screen for colon cancer, Benign nodular prostatic hyperplasia without lower urinary tract symptoms, Skin lesion, Smoking, Screening for prostate cancer       * Notice:  This list has 2 medication(s) that are the same as other medications prescribed for you. Read the directions carefully, and ask your doctor or other care provider to review them with you.

## 2018-11-09 NOTE — PROGRESS NOTES
HPI:    Pt. Comes in for follow up today. Overall doing well. He has stopped drinking EtOH. He is exercising more. He is checking his Blood sugars and BP. He has some mild urinary sxs. He has some mild lower back pain. O/w no additional HEENT, cardiopulmonary, abdominal, , neurological complaints.    PE:    Vitals noted gen nad cooperative alert, HEENT oropharynx clear neck supple nl rom, LCTA, B, good air movement, RRR, S1, S2,. No MRG, no acute findings abdominal exam. No tenderness to palpation of lower back    A/P:    1. PSA checked at 0.30 6/8/2018. Will refer to Urology for possible BPH and he wants to wait on any surgery for now.   2. H/o myocarditis; see by Dr. Rich, Cardiology 6/28/2018; he had resting echo 6/27/2018: EF 45-50% with mild global hypokinesis  3. EtOH use/abuse; he states he has stopped drinking  4. Fatty liver; abdominal U/S 7/27/18 stable and seen in GI 7/2017; liver tests normal 8/8/2018  5. DM2; A1c 7.1% 8/8/2018 (down from 10.2% 6/8/2018). Seen by Dr. Ceja, Endo 8/22/2018 and he remains on Metformin. Placed future order for A1C today 11/9/2018  6. Colonoscopy 5/2017 repeat in 3 years  7. Seen in Dermatology 5/2017  8. Ordered plain films lumbar spine and pelvis for back pain and placed referral to Dr. Daigle, ortho today  9. Check with insurance regarding new Shingles vaccine        Total time spent 25 minutes.  More than 50% of the time spent with Mr. Valencia on counseling / coordinating his care

## 2018-11-12 NOTE — TELEPHONE ENCOUNTER
FUTURE VISIT INFORMATION      FUTURE VISIT INFORMATION:    Date: 11/15/18    Time:     [place] [facility] [place]  REFERRAL INFORMATION:    Referring provider:  Wes Harris    Referring providers clinic:      Reason for visit/diagnosis  hip and back pain per Dr. Harris [facility].     RECORDS REQUESTED FROM:       Clinic name Comments Records Status Imaging Status     internal n/a

## 2018-11-15 ENCOUNTER — OFFICE VISIT (OUTPATIENT)
Dept: ORTHOPEDICS | Facility: CLINIC | Age: 63
End: 2018-11-15
Payer: COMMERCIAL

## 2018-11-15 ENCOUNTER — PRE VISIT (OUTPATIENT)
Dept: ORTHOPEDICS | Facility: CLINIC | Age: 63
End: 2018-11-15

## 2018-11-15 ENCOUNTER — TELEPHONE (OUTPATIENT)
Dept: ORTHOPEDICS | Facility: CLINIC | Age: 63
End: 2018-11-15

## 2018-11-15 VITALS
HEIGHT: 72 IN | DIASTOLIC BLOOD PRESSURE: 77 MMHG | SYSTOLIC BLOOD PRESSURE: 122 MMHG | BODY MASS INDEX: 27.22 KG/M2 | WEIGHT: 201 LBS

## 2018-11-15 DIAGNOSIS — M48.062 SPINAL STENOSIS OF LUMBAR REGION WITH NEUROGENIC CLAUDICATION: Primary | ICD-10-CM

## 2018-11-15 DIAGNOSIS — M25.561 RIGHT KNEE PAIN, UNSPECIFIED CHRONICITY: Primary | ICD-10-CM

## 2018-11-15 NOTE — TELEPHONE ENCOUNTER
Returned call to patient, informing him that Dr. Daigle agreed to do x-rays of the right knee. He can have these done the same day as his MRI or the day he follows up with Dr. Daigle. He verbalized understanding. Order placed.

## 2018-11-15 NOTE — Clinical Note
Thank you for allowing me to see your patient in Sports Medicine Clinic.  Please see the attached copy of our visit.  Sincerely,  Adan Daigle MD

## 2018-11-15 NOTE — MR AVS SNAPSHOT
After Visit Summary   11/15/2018    Sujata Valencia    MRN: [id number]           Patient Information     Date Of Birth          1955        Visit Information        Provider Department      11/15/2018 12:00 PM Adan Daigle MD [facility] Sports Medicine        Today's Diagnoses     Spinal stenosis of lumbar region with neurogenic claudication    -  1       Follow-ups after your visit        Your next 10 appointments already scheduled     Nov 20, 2018 10:00 AM CST   MR LUMBAR SPINE W/O CONTRAST with SHMRP2   [facility] ([facility])    [address] [place] [id number]   [phone]           How do I prepare for my exam? (Food and drink instructions) **If you will be receiving sedation or general anesthesia, please see special notes below.**  How do I prepare for my exam? (Other instructions) Take your medicines as usual, unless your doctor tells you not to. Please remove any body piercings and hair extensions before you arrive. Follow your doctor s orders. If you do not, we may have to postpone your exam. You may or may not receive IV contrast for this exam pending the discretion of the Radiologist.  You do not need to do anything special to prepare. **If you will be receiving sedation or general anesthesia, please see special notes below.**  What should I wear:  The MRI machine uses a strong magnet. Please wear clothes without metal (snaps, zippers). A sweatsuit works well, or we may give you a hospital gown.  How long does the exam take: Most tests take 30 to 60 minutes.  HOWEVER, IF YOUR DOCTOR PRESCRIBES ANESTHESIA please plan on spending four to five hours in the recovery room.  What should I bring: Bring a list of your current medicines to your exam (including vitamins, minerals and over-the-counter drugs). Also bring the results of similar scans you may have had.  Do I need a : **If you will be receiving sedation or general  anesthesia, please see special notes below.**  What should I do after the exam? No Restrictions, You may resume normal activities.  What is this test: MRI (magnetic resonance imaging) uses a strong magnet and radio waves to look inside the body. An MRA (magnetic resonance angiogram) does the same thing, but it lets us look at your blood vessels. A computer turns the radio waves into pictures showing cross sections of the body, much like slices of bread. This helps us see any problems more clearly.  Who should I call with questions: Please call the Imaging Department at your exam site with any questions. Directions, parking instructions, and other information is available on our website, [email or web address].  How do I prepare if I m having sedation or anesthesia? **IMPORTANT** THE INSTRUCTIONS BELOW ARE ONLY FOR THOSE PATIENTS WHO HAVE BEEN TOLD THEY WILL RECEIVE SEDATION OR GENERAL ANESTHESIA DURING THEIR MRI PROCEDURE:  IF YOU WILL RECEIVE SEDATION (take medicine to help you relax during your exam): You must get the medicine from your doctor before you arrive. Bring the medicine to the exam. Do not take it at home. Arrive one hour early. Bring someone who can take you home after the test. Your medicine will make you sleepy. After the exam, you may not drive, take a bus or take a taxi by yourself. No eating 8 hours before your exam. You may have clear liquids up until 4 hours before your exam. (Clear liquids include water, clear tea, black coffee and fruit juice without pulp.)  IF YOU WILL RECEIVE ANESTHESIA (be asleep for your exam): Arrive 1 1/2 hours early. Bring someone who can take you home after the test. You may not drive, take a bus or take a taxi by yourself. No eating 8 hours before your exam. You may have clear liquids up until 4 hours before your exam. (Clear liquids include water, clear tea, black coffee and fruit juice without pulp.) You will spend four to five hours in the recovery room.             Nov 26, 2018 12:15 PM CST   (Arrive by 12:00 PM)   Return Visit with Adan Daigle MD   [facility] Sports Medicine ([facility])    [address]  5th Floor  [facility] [address] [phone]            Dec 10, 2018 11:30 AM CST   LAB with  LAB   [facility] Lab ([facility])    [address]  1st Floor  [facility] [id number] [phone]           Please do not eat 10-12 hours before your appointment if you are coming in fasting for labs on lipids, cholesterol, or glucose (sugar). This does not apply to pregnant women. Water, hot tea and black coffee (with nothing added) are okay. Do not drink other fluids, diet soda or chew gum.            Dec 10, 2018 12:35 PM CST   (Arrive by 12:20 PM)   Return Visit with Wes Harris MD   [facility] Primary Care Clinic ([facility])    [address]  4th Floor  [facility] [address] [phone]              Future tests that were ordered for you today     Open Future Orders        Priority Expected Expires Ordered    MRI Lumbar spine w/o contrast Routine  11/15/2019 11/15/2018            Who to contact     Please call your clinic at [phone] to:    Ask questions about your health    Make or cancel appointments    Discuss your medicines    Learn about your test results    Speak to your doctor            Additional Information About Your Visit        [employer] Information     [employer] gives you secure access to your electronic health record. If you see a primary care provider, you can also send messages to your care team and make appointments. If you have questions, please call your primary care clinic.  If you do not have a primary care provider, please call [phone] and they will assist you.      [employer] is an electronic gateway that provides easy, online access to your medical records. With [employer], you can request a clinic appointment, read your test  results, renew a prescription or communicate with your care team.     To access your existing account, please contact your [facility] or call [phone] for assistance.        Care EveryWhere ID     This is your Care EveryWhere ID. This could be used by other organizations to access your [place] medical records  [id number]        Your Vitals Were     Height BMI (Body Mass Index)                1.829 m (6') 27.26 kg/m2           Blood Pressure from Last 3 Encounters:   11/15/18 122/77   11/09/18 122/77   09/05/18 125/80    Weight from Last 3 Encounters:   11/15/18 91.2 kg (201 lb)   11/09/18 91.3 kg (201 lb 3.2 oz)   09/05/18 91.9 kg (202 lb 11.2 oz)               Primary Care Provider Office Phone # Fax #    Wes Harris [phone]       [address] [address]        Equal Access to Services     FRENCH MCELROY : Hadii aad ku hadasho Soomaali, waaxda luqadaha, qaybta kaalmada adeegyada, waxay idiin hayaan nguyễneg natalya nye . So [facility] [phone].    ATENCIÓN: Si habla español, tiene a roberts disposición servicios gratuitos de asistencia lingüística. Llame al [phone].    We comply with applicable federal civil rights laws and [place] laws. We do not discriminate on the basis of race, color, national origin, age, disability, sex, sexual orientation, or gender identity.            Thank you!     Thank you for choosing [facility]  for your care. Our goal is always to provide you with excellent care. Hearing back from our patients is one way we can continue to improve our services. Please take a few minutes to complete the written survey that you may receive in the mail after your visit with us. Thank you!             Your Updated Medication List - Protect others around you: Learn how to safely use, store and throw away your medicines at www.disposemymeds.org.          This list is accurate as of 11/15/18  1:03 PM.  Always  use your most recent med list.                   Brand Name Dispense Instructions for use Diagnosis    aspirin 81 MG tablet      Take 1 tablet by mouth daily.        blood glucose monitoring lancets     100 each    Use to test blood sugars 2 times daily or as directed.    Diabetes mellitus without complication (H)       blood glucose monitoring meter device kit    no brand specified    1 kit    Use to test blood sugar 2-4 times daily or as directed.    Increased glucose level       * blood glucose monitoring test strip    no brand specified    100 strip    Use to test blood sugars 2-4 times daily or as directed    Increased glucose level       * blood glucose monitoring test strip    no brand specified    100 strip    Use to test blood sugars 2 times daily or as directed    Diabetes mellitus without complication (H)       clotrimazole-betamethasone cream    LOTRISONE    15 g    Apply topically 2 times daily    Skin lesion       FISH OIL PO      Take 1 g by mouth daily        GINSENG PO      Take by mouth daily    Stress, Increased glucose level       losartan 25 MG tablet    COZAAR    90 tablet    Take 1 tablet (25 mg) by mouth daily    Benign essential hypertension, Screen for colon cancer, Benign nodular prostatic hyperplasia without lower urinary tract symptoms, Skin lesion, Erectile dysfunction due to arterial insufficiency, Smoking, Screening for prostate cancer       metFORMIN 500 MG 24 hr tablet    GLUCOPHAGE-XR    90 tablet    Take 1 tablet (500 mg) by mouth daily (with dinner)    Type 2 diabetes mellitus with hyperglycemia, without long-term current use of insulin (H)       metoprolol succinate 25 MG 24 hr tablet    TOPROL XL    90 tablet    Take 1 tablet (25 mg) by mouth daily    Benign essential hypertension, Screen for colon cancer, Benign nodular prostatic hyperplasia without lower urinary tract symptoms, Skin lesion       MULTIVITAMINS PO      Take 1 tablet by mouth daily.        nicotine polacrilex 2  MG gum    NICORETTE    880 each    Place 1 each (2 mg) inside cheek as needed for smoking cessation *CHEW APPROX. 20 PCS PER DAY AS DIRECTED    Smoking, Erectile dysfunction due to arterial insufficiency, Benign essential hypertension, Screen for colon cancer, Benign nodular prostatic hyperplasia without lower urinary tract symptoms, Skin lesion, Screening for prostate cancer       olopatadine 0.1 % ophthalmic solution    PATANOL    1 Bottle    Place 1 drop into both eyes 2 times daily    Allergic conjunctivitis, bilateral       psyllium 58.6 % Powd    METAMUCIL     Take 2 teaspoonful by mouth daily        sildenafil 100 MG tablet    VIAGRA    8 tablet    Take 0.5-1 tablets ( mg) by mouth daily as needed    Erectile dysfunction due to arterial insufficiency, Benign essential hypertension, Screen for colon cancer, Benign nodular prostatic hyperplasia without lower urinary tract symptoms, Skin lesion, Smoking, Screening for prostate cancer       * Notice:  This list has 2 medication(s) that are the same as other medications prescribed for you. Read the directions carefully, and ask your doctor or other care provider to review them with you.

## 2018-11-15 NOTE — LETTER
11/15/2018      RE: Sujata Valencia  [address] [place] [address]       Sports Medicine Clinic Visit    PCP: Wes Harris    Sujata Valencia is a 62 year old male who is seen  in consultation at the request of Dr. Harris presenting with chronic recurrent bilateral hip and lower back pain.   H/o hip impingement, h/o lumbar stenosis per MRI.  Additionally DX Inflammatory arthritis Dr. Cisneros in Rheumatology 2012.  When he was in 11th grade (15 yo), had episodes of joint swelling (ankle, knee). [place] in 1980[place]. He has pain in R SI joint x years. He was diagnosed with R sciatica in 1988. He had PT, which helped some. He had steroid inj to R SI joint and R hip (2012).  At that time he was referred for an SI joint injection and reported an 80% hip and back pain relief after this injection.  An SI joint xray showed no djd findings.  He reported a 65-70% hip pain relief after additional intraarticular hip injection in 2012. He did PT at the time with some improvement.  No h/o psoriasis, uveitis, or IBD.  No FH inherited arthritides.  In 2012 elevated CCP, neg HLA B27, Neg CRP, ESR, neg DS DNA. Neg Rheum factor. Recently in 2018 wnl SKIP, SARAH, Vit D.    His current complaints are discomfort in his centralized low back as well as his bilateral calves.  He likes to walk for exercise and if he walks close to a mile he will start to get a numb and tingling sensation in the bilateral posterior calves.  He Has noted if he leans forward it tends to improve.  He will also get centralized low back discomfort.  If he is resting and inactive pain will not be present.  The more activity the more likely he is to have centralized low back discomfort.  He denies consistent radicular shooting pain into the legs.  He denies groin pain on the right.      Injury: None    Location of Pain: bilateral calves, lower back  Duration of Pain: 30 year(s)  Pain is better with: Massage, Aleve  Pain is worse with:  Physical activity around his house such as raking, climbing stairs  Additional Features:   Treatment so far consists of: Physical Therapy  Prior History of related problems:       /77  Ht 6' (1.829 m)  Wt 201 lb (91.2 kg)  BMI 27.26 kg/m2         PMH:  Past Medical History:   Diagnosis Date     Arthritis      Back pains, lower      Chest pain 7/28/2014     Hypertension      Myocarditis (H)      Other abnormal glucose 7/21/2011     Other and unspecified hyperlipidemia 7/21/2011     Subclinical hypothyroidism        Active problem list:  Patient Active Problem List   Diagnosis   (none) - all problems resolved or deleted       FH:  Family History   Problem Relation Age of Onset     Osteoporosis Mother      Diabetes Maternal Grandmother      Glaucoma No family hx of      Macular Degeneration No family hx of        SH:  Social History     Social History     Marital status:      Spouse name: N/A     Number of children: 1     Years of education: N/A     Occupational History      Unknown     [employer]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[employer], OWNS A [employer]     Social History Main Topics     Smoking status: Former Smoker     Packs/day: 1.00     Years: 25.00     Start date: 1975     Quit date: 10/31/2001     Smokeless tobacco: Never Used     Alcohol use Yes      Comment: scotch 2 times a week, wine 5 days per week at drs request     Drug use: No     Sexual activity: Not on file     Other Topics Concern     Not on file     Social History Narrative       MEDS:  See EMR, reviewed  ALL:  See EMR, reviewed    REVIEW OF SYSTEMS:  CONSTITUTIONAL:NEGATIVE for fever, chills, change in weight  INTEGUMENTARY/SKIN: NEGATIVE for worrisome rashes, moles or lesions  EYES: NEGATIVE for vision changes or irritation  ENT/MOUTH: NEGATIVE for ear, mouth and throat problems  RESP:NEGATIVE for significant cough or SOB  BREAST: NEGATIVE for masses, tenderness or discharge  CV: NEGATIVE for chest pain, palpitations or peripheral edema  GI: NEGATIVE for nausea,  abdominal pain, heartburn, or change in bowel habits  :NEGATIVE for frequency, dysuria, or hematuria  :NEGATIVE for frequency, dysuria, or hematuria  NEURO: NEGATIVE for weakness, dizziness or paresthesias  ENDOCRINE: NEGATIVE for temperature intolerance, skin/hair changes  HEME/ALLERGY/IMMUNE: NEGATIVE for bleeding problems  PSYCHIATRIC: NEGATIVE for changes in mood or affect           EXAMINATION: Sacroiliac joint series     DATE: 9/26/2012     HISTORY: Sacroiliitis     FINDINGS: Frontal and bilateral oblique views of the sacroiliac  joints demonstrate normal alignment. There are no distinct erosions  identified along the sacroiliac joints. No ankylosis is seen. No  fracture.     IMPRESSION:  1. No radiographic evidence of sacroiliitis.            MRI of the Lumbar Spine without contrast  (2011)    History: Lumbar radiculopathy. .   Comparison: Lumbar spine radiographs 5/29/2007     Technique: Sagittal T1-weighted and T2-weighted and axial T2-weighted  images of the lumbar spine were obtained without intravenous contrast.  Reconstructed MR myelographic images were also obtained.    Findings:     There is mild disc height loss of L1-L2, L4-L5, and moderate disc  height loss L5-S1. There are mixed Modic type I and Type II endplate  degenerative signal changes of L5-S1 with endplate osteophytes  asymmetrically prominent on the right. There is a developmentally  narrow spinal canal from L3-S1. Disc bulges do result in a  disproportionately greater spinal canal impingement compared to a  developmentally normal size spinal canal.    There are 5 lumbar-type vertebrae assumed for the purposes of this  dictation. The tip of the conus medullaris is at L1. The lumbar  vertebral column appears normally aligned.     On a level by level basis:    L1-L2: There is a mild broad-based posterior disc bulge with mild  effacement of the ventral thecal sac and no spinal canal or neural  foraminal stenosis.    L2-3: No significant  spinal canal or foraminal narrowing. There is  mild bilateral posterior facet degenerative hypertrophy.    L3-4: There is a developmentally narrow spinal canal, a mild  broad-based posterior disc bulge. mild to moderate ligamentum flavum  thickening, and mild bilateral posterior facet degenerative  hypertrophy. The constellation of findings results in mild spinal  canal stenosis. The neural foramina are patent.    L4-5: There is a developmentally narrow spinal canal, mild disc height  loss, a moderate sized broad based posterior disc bulge, and moderate  posterior facet degenerative hypertrophy. The constellation of  findings results in severe spinal canal stenosis, moderate-to-severe  bilateral lateral recess stenosis, and mild-moderate left with mild  right neural foraminal stenosis.    L5-S1: There is a developmentally narrow spinal canal, a mild  broad-based posterior disc bulge with moderate disc height loss, a  posterior central annular tear, and moderate right with mild left  posterior facet degenerative hypertrophy. The constellation of  findings results in mild spinal canal stenosis, severe right lateral  recess stenosis, and mild bilateral neural foraminal stenosis. The  nonexited right S1 nerve root is compressed by the disc bulge within  the right lateral recess.    The visualized paraspinous tissues anteriorly are unremarkable.     Impression:       1. Multilevel degenerative disc disease in the setting of a  developmentally narrow spinal canal. There is severe spinal canal  stenosis at L4-L5, and mild spinal canal stenosis at L3-L4 and L5-S1.  2. Moderate-severe bilateral lateral recess stenosis with  mild-moderate left and mild right neural foraminal stenosis at L4-L5.  3. Severe right lateral recess stenosis and mild bilateral neural  foraminal stenosis at L5-S1. The nonexited right S1 nerve is  compressed by a disc bulge in the right lateral recess.            June 1, 2012 right hip injection under  fluoroscopic guidance     HISTORY:    Right hip pain     Consent: Informed verbal and written consent was obtained from the  patient prior to the procedure     Sedation:  None     FINDINGS: Following usual sterile prep and drape,right hip     utilizing 1% Xylocaine local anesthesia, a 22-gauge spinal needle was  advanced under intermittent fluoroscopic guidance into the right hip  joint without difficulty. A small amount of contrast confirmed  placement. 1 cc of Kenalog and 4 cc of 0.25% Marcaine were injected  without difficulty. The patient tolerated the procedure well and was  discharged home.       IMPRESSION:    Successful right hip joint injection under  fluoroscopic guidance as described.The patient reported near-complete  relief of pain post injection.          EXAM:  X-RAY O PELVIS, 2+ VIEWS W UNILATERAL HIP May 31, 2012 10:41:00  AM.     HISTORY: PAIN IN JOINT, PELVIC REGION AND THIGH      COMPARISON: None     FINDINGS: There is bilateral femoral head-neck junction fullness.  Bilateral superior lateral joint space narrowing at the hip joints.   The visualized bones and joints are within normal limits. There is no  soft tissue swelling. There is no acute fracture or dislocation.      IMPRESSION:   1. Bilateral reduced femoral head neck offset, as can be found in the  clinical context of femoroacetabular impingement.   2. Bilateral superior lateral joint space narrowing at the hip joints.  3. Negative for acute osseous changes.  I have personally reviewed the image and initial interpretation and  agree with the findings.          Forward flexion of lumbar spine to touch shins.  Extension full, without limitation or discomfort.      Straight leg raise in seated position with chin-to-chest negative bilaterally.    Lower extremity strength is 5/5 symmetrically bilaterally to flexion and extension at hips, knees, ankles, including toe strength and foot evertor strength.    ADRIENNE test negative.  Normal ROM at  hips bilaterally.  Nontender over bilateral SI joints.  Sacral compression without discomfort.  Spring testing of lumbar spine without discomfort at any level.    Inguinal pulses and posterior tibial pulses strong and symmetrical bilaterally.  No abdominal masses palpated.  He indicates he has had a normal colonoscopy.    Sensation to light touch intact bilateral lower extremities.    Skin overlying low back wnl.  Appropriate in conversation and affect      Assessment: Lumbar spinal stenosis    Plan: Repeat lumbar MRI is pending.  We will follow-up after the MRI to go over options.  We talked in detail about options including physical therapy, epidural steroid injection, the option of surgical consultation.  He would like to avoid injections and surgery if possible.  We will discuss things further after the MRI results return.    Addendum:  Before leaving he requested evaluation of his ongoing weightbearing right knee pain.  An xray is pending and we will discuss results at his next visit.        Adan Daigle MD

## 2018-11-15 NOTE — TELEPHONE ENCOUNTER
[facility] Call Center    Phone Message    May a detailed message be left on voicemail: yes    Reason for Call: Other: Pt forgot to mention Rt knee pain to Dr. Daigle. Was wondering if he could order imaging of the rt knee to take place the same day as his MRI. Please call him to discuss.     Action Taken: Message routed to:  [facility] ([facility] Sports

## 2018-11-15 NOTE — PROGRESS NOTES
Sports Medicine Clinic Visit    PCP: Wes Harris MJ Valencia is a 62 year old male who is seen  in consultation at the request of Dr. Harris presenting with chronic recurrent bilateral hip and lower back pain.   H/o hip impingement, h/o lumbar stenosis per MRI.  Additionally DX Inflammatory arthritis Dr. Cisneros in Rheumatology 2012.  When he was in 11th grade (15 yo), had episodes of joint swelling (ankle, knee). [place] in 1980[place]. He has pain in R SI joint x years. He was diagnosed with R sciatica in 1988. He had PT, which helped some. He had steroid inj to R SI joint and R hip (2012).  At that time he was referred for an SI joint injection and reported an 80% hip and back pain relief after this injection.  An SI joint xray showed no djd findings.  He reported a 65-70% hip pain relief after additional intraarticular hip injection in 2012. He did PT at the time with some improvement.  No h/o psoriasis, uveitis, or IBD.  No FH inherited arthritides.  In 2012 elevated CCP, neg HLA B27, Neg CRP, ESR, neg DS DNA. Neg Rheum factor. Recently in 2018 wnl SKIP, TSH, Vit D.    His current complaints are discomfort in his centralized low back as well as his bilateral calves.  He likes to walk for exercise and if he walks close to a mile he will start to get a numb and tingling sensation in the bilateral posterior calves.  He Has noted if he leans forward it tends to improve.  He will also get centralized low back discomfort.  If he is resting and inactive pain will not be present.  The more activity the more likely he is to have centralized low back discomfort.  He denies consistent radicular shooting pain into the legs.  He denies groin pain on the right.      Injury: None    Location of Pain: bilateral calves, lower back  Duration of Pain: 30 year(s)  Pain is better with: Massage, Aleve  Pain is worse with: Physical activity around his house such as raking, climbing stairs  Additional Features:    Treatment so far consists of: Physical Therapy  Prior History of related problems:       /77  Ht 6' (1.829 m)  Wt 201 lb (91.2 kg)  BMI 27.26 kg/m2         PMH:  Past Medical History:   Diagnosis Date     Arthritis      Back pains, lower      Chest pain 7/28/2014     Hypertension      Myocarditis (H)      Other abnormal glucose 7/21/2011     Other and unspecified hyperlipidemia 7/21/2011     Subclinical hypothyroidism        Active problem list:  Patient Active Problem List   Diagnosis   (none) - all problems resolved or deleted       FH:  Family History   Problem Relation Age of Onset     Osteoporosis Mother      Diabetes Maternal Grandmother      Glaucoma No family hx of      Macular Degeneration No family hx of        SH:  Social History     Social History     Marital status:      Spouse name: N/A     Number of children: 1     Years of education: N/A     Occupational History      Unknown     [employer]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[employer], OWNS A [employer]     Social History Main Topics     Smoking status: Former Smoker     Packs/day: 1.00     Years: 25.00     Start date: 1975     Quit date: 10/31/2001     Smokeless tobacco: Never Used     Alcohol use Yes      Comment: scotch 2 times a week, wine 5 days per week at drs request     Drug use: No     Sexual activity: Not on file     Other Topics Concern     Not on file     Social History Narrative       MEDS:  See EMR, reviewed  ALL:  See EMR, reviewed    REVIEW OF SYSTEMS:  CONSTITUTIONAL:NEGATIVE for fever, chills, change in weight  INTEGUMENTARY/SKIN: NEGATIVE for worrisome rashes, moles or lesions  EYES: NEGATIVE for vision changes or irritation  ENT/MOUTH: NEGATIVE for ear, mouth and throat problems  RESP:NEGATIVE for significant cough or SOB  BREAST: NEGATIVE for masses, tenderness or discharge  CV: NEGATIVE for chest pain, palpitations or peripheral edema  GI: NEGATIVE for nausea, abdominal pain, heartburn, or change in bowel habits  :NEGATIVE for frequency, dysuria,  or hematuria  :NEGATIVE for frequency, dysuria, or hematuria  NEURO: NEGATIVE for weakness, dizziness or paresthesias  ENDOCRINE: NEGATIVE for temperature intolerance, skin/hair changes  HEME/ALLERGY/IMMUNE: NEGATIVE for bleeding problems  PSYCHIATRIC: NEGATIVE for changes in mood or affect           EXAMINATION: Sacroiliac joint series     DATE: 9/26/2012     HISTORY: Sacroiliitis     FINDINGS: Frontal and bilateral oblique views of the sacroiliac  joints demonstrate normal alignment. There are no distinct erosions  identified along the sacroiliac joints. No ankylosis is seen. No  fracture.     IMPRESSION:  1. No radiographic evidence of sacroiliitis.            MRI of the Lumbar Spine without contrast  (2011)    History: Lumbar radiculopathy. .   Comparison: Lumbar spine radiographs 5/29/2007     Technique: Sagittal T1-weighted and T2-weighted and axial T2-weighted  images of the lumbar spine were obtained without intravenous contrast.  Reconstructed MR myelographic images were also obtained.    Findings:     There is mild disc height loss of L1-L2, L4-L5, and moderate disc  height loss L5-S1. There are mixed Modic type I and Type II endplate  degenerative signal changes of L5-S1 with endplate osteophytes  asymmetrically prominent on the right. There is a developmentally  narrow spinal canal from L3-S1. Disc bulges do result in a  disproportionately greater spinal canal impingement compared to a  developmentally normal size spinal canal.    There are 5 lumbar-type vertebrae assumed for the purposes of this  dictation. The tip of the conus medullaris is at L1. The lumbar  vertebral column appears normally aligned.     On a level by level basis:    L1-L2: There is a mild broad-based posterior disc bulge with mild  effacement of the ventral thecal sac and no spinal canal or neural  foraminal stenosis.    L2-3: No significant spinal canal or foraminal narrowing. There is  mild bilateral posterior facet degenerative  hypertrophy.    L3-4: There is a developmentally narrow spinal canal, a mild  broad-based posterior disc bulge. mild to moderate ligamentum flavum  thickening, and mild bilateral posterior facet degenerative  hypertrophy. The constellation of findings results in mild spinal  canal stenosis. The neural foramina are patent.    L4-5: There is a developmentally narrow spinal canal, mild disc height  loss, a moderate sized broad based posterior disc bulge, and moderate  posterior facet degenerative hypertrophy. The constellation of  findings results in severe spinal canal stenosis, moderate-to-severe  bilateral lateral recess stenosis, and mild-moderate left with mild  right neural foraminal stenosis.    L5-S1: There is a developmentally narrow spinal canal, a mild  broad-based posterior disc bulge with moderate disc height loss, a  posterior central annular tear, and moderate right with mild left  posterior facet degenerative hypertrophy. The constellation of  findings results in mild spinal canal stenosis, severe right lateral  recess stenosis, and mild bilateral neural foraminal stenosis. The  nonexited right S1 nerve root is compressed by the disc bulge within  the right lateral recess.    The visualized paraspinous tissues anteriorly are unremarkable.     Impression:       1. Multilevel degenerative disc disease in the setting of a  developmentally narrow spinal canal. There is severe spinal canal  stenosis at L4-L5, and mild spinal canal stenosis at L3-L4 and L5-S1.  2. Moderate-severe bilateral lateral recess stenosis with  mild-moderate left and mild right neural foraminal stenosis at L4-L5.  3. Severe right lateral recess stenosis and mild bilateral neural  foraminal stenosis at L5-S1. The nonexited right S1 nerve is  compressed by a disc bulge in the right lateral recess.            June 1, 2012 right hip injection under fluoroscopic guidance     HISTORY:    Right hip pain     Consent: Informed verbal and  written consent was obtained from the  patient prior to the procedure     Sedation:  None     FINDINGS: Following usual sterile prep and drape,right hip     utilizing 1% Xylocaine local anesthesia, a 22-gauge spinal needle was  advanced under intermittent fluoroscopic guidance into the right hip  joint without difficulty. A small amount of contrast confirmed  placement. 1 cc of Kenalog and 4 cc of 0.25% Marcaine were injected  without difficulty. The patient tolerated the procedure well and was  discharged home.       IMPRESSION:    Successful right hip joint injection under  fluoroscopic guidance as described.The patient reported near-complete  relief of pain post injection.          EXAM:  X-RAY O PELVIS, 2+ VIEWS W UNILATERAL HIP May 31, 2012 10:41:00  AM.     HISTORY: PAIN IN JOINT, PELVIC REGION AND THIGH      COMPARISON: None     FINDINGS: There is bilateral femoral head-neck junction fullness.  Bilateral superior lateral joint space narrowing at the hip joints.   The visualized bones and joints are within normal limits. There is no  soft tissue swelling. There is no acute fracture or dislocation.      IMPRESSION:   1. Bilateral reduced femoral head neck offset, as can be found in the  clinical context of femoroacetabular impingement.   2. Bilateral superior lateral joint space narrowing at the hip joints.  3. Negative for acute osseous changes.  I have personally reviewed the image and initial interpretation and  agree with the findings.          Forward flexion of lumbar spine to touch shins.  Extension full, without limitation or discomfort.      Straight leg raise in seated position with chin-to-chest negative bilaterally.    Lower extremity strength is 5/5 symmetrically bilaterally to flexion and extension at hips, knees, ankles, including toe strength and foot evertor strength.    ADRIENNE test negative.  Normal ROM at hips bilaterally.  Nontender over bilateral SI joints.  Sacral compression without  discomfort.  Spring testing of lumbar spine without discomfort at any level.    Inguinal pulses and posterior tibial pulses strong and symmetrical bilaterally.  No abdominal masses palpated.  He indicates he has had a normal colonoscopy.    Sensation to light touch intact bilateral lower extremities.    Skin overlying low back wnl.  Appropriate in conversation and affect      Assessment: Lumbar spinal stenosis    Plan: Repeat lumbar MRI is pending.  We will follow-up after the MRI to go over options.  We talked in detail about options including physical therapy, epidural steroid injection, the option of surgical consultation.  He would like to avoid injections and surgery if possible.  We will discuss things further after the MRI results return.    Addendum:  Before leaving he requested evaluation of his ongoing weightbearing right knee pain.  An xray is pending and we will discuss results at his next visit.

## 2018-11-20 ENCOUNTER — HOSPITAL ENCOUNTER (OUTPATIENT)
Dept: GENERAL RADIOLOGY | Facility: CLINIC | Age: 63
End: 2018-11-20
Attending: FAMILY MEDICINE
Payer: COMMERCIAL

## 2018-11-20 ENCOUNTER — HOSPITAL ENCOUNTER (OUTPATIENT)
Dept: MRI IMAGING | Facility: CLINIC | Age: 63
Discharge: HOME | End: 2018-11-20
Attending: FAMILY MEDICINE | Admitting: FAMILY MEDICINE
Payer: COMMERCIAL

## 2018-11-20 DIAGNOSIS — M25.561 RIGHT KNEE PAIN, UNSPECIFIED CHRONICITY: ICD-10-CM

## 2018-11-20 DIAGNOSIS — M48.062 SPINAL STENOSIS OF LUMBAR REGION WITH NEUROGENIC CLAUDICATION: ICD-10-CM

## 2018-11-20 PROCEDURE — 72148 MRI LUMBAR SPINE W/O DYE: CPT

## 2018-11-20 PROCEDURE — 73562 X-RAY EXAM OF KNEE 3: CPT | Mod: RT

## 2018-11-26 ENCOUNTER — OFFICE VISIT (OUTPATIENT)
Dept: ORTHOPEDICS | Facility: CLINIC | Age: 63
End: 2018-11-26
Payer: COMMERCIAL

## 2018-11-26 DIAGNOSIS — M17.0 PRIMARY OSTEOARTHRITIS OF BOTH KNEES: Primary | ICD-10-CM

## 2018-11-26 DIAGNOSIS — M48.062 LUMBAR STENOSIS WITH NEUROGENIC CLAUDICATION: ICD-10-CM

## 2018-11-26 NOTE — MR AVS SNAPSHOT
After Visit Summary   11/26/2018    Sujata Valencia    MRN: [id number]           Patient Information     Date Of Birth          1955        Visit Information        Provider Department      11/26/2018 12:15 PM Adan Daigle MD [facility] Medicine        Today's Diagnoses     Primary osteoarthritis of both knees    -  1    Lumbar stenosis with neurogenic claudication           Follow-ups after your visit        Additional Services     PHYSICAL THERAPY REFERRAL (Internal)       Physical Therapy Referral                  Your next 10 appointments already scheduled     Dec 10, 2018 11:30 AM CST   LAB with  LAB   [facility])    [address] [place] [address]  [address] [facility] [id number] [phone]           Please do not eat 10-12 hours before your appointment if you are coming in fasting for labs on lipids, cholesterol, or glucose (sugar). This does not apply to pregnant women. Water, hot tea and black coffee (with nothing added) are okay. Do not drink other fluids, diet soda or chew gum.            Dec 10, 2018 12:35 PM CST   (Arrive by 12:20 PM)   Return Visit with Wes Harris MD   [facility] ([facility])    [address] [place] [id number] [phone]              Future tests that were ordered for you today     Open Future Orders        Priority Expected Expires Ordered    PHYSICAL THERAPY REFERRAL (Internal) Routine  11/26/2019 11/26/2018            Who to contact     Please call your clinic at [phone] to:    Ask questions about your health    Make or cancel appointments    Discuss your medicines    Learn about your test results    Speak to your doctor            Additional Information About Your Visit        MyChart Information     [employer] gives you secure access to your electronic health record. If you see a primary care provider, you can also send  messages to your care team and make appointments. If you have questions, please call your primary care clinic.  If you do not have a primary care provider, please call [phone] and they will assist you.      [employer] is an electronic gateway that provides easy, online access to your medical records. With [employer], you can request a clinic appointment, read your test results, renew a prescription or communicate with your care team.     To access your existing account, please contact your [facility] or call [phone] for assistance.        Care EveryWhere ID     This is your Care EveryWhere ID. This could be used by other organizations to access your [place] medical records  [id number]         Blood Pressure from Last 3 Encounters:   11/15/18 122/77   11/09/18 122/77   09/05/18 125/80    Weight from Last 3 Encounters:   11/15/18 201 lb (91.2 kg)   11/09/18 201 lb 3.2 oz (91.3 kg)   09/05/18 202 lb 11.2 oz (91.9 kg)                 Today's Medication Changes          These changes are accurate as of 11/26/18 12:38 PM.  If you have any questions, ask your nurse or doctor.               Start taking these medicines.        Dose/Directions    diclofenac 1 % topical gel   Commonly known as:  VOLTAREN   Used for:  Primary osteoarthritis of both knees        Apply 4 grams to affected area  four times daily using enclosed dosing card.   Quantity:  100 g   Refills:  1            Where to get your medicines      These medications were sent to [place] [address] [place] [facility] [address]  [address] [facility] [address]     Phone:  [phone]     diclofenac 1 % topical gel                Primary Care Provider Office Phone # Fax #    Wes Harris [phone]       [address] [address]        Equal Access to Services     FRENCH MCELROY AH: Cmaden Camara, julius younger, qalarry michaud  rosariomassielmitzi milesaajerome ah. So [facility] [phone].    ATENCIÓN: Si ignaciola fern, tiene a roberts disposición servicios gratuitos de asistencia lingüística. Malvin al [phone].    We comply with applicable federal civil rights laws and [place] laws. We do not discriminate on the basis of race, color, national origin, age, disability, sex, sexual orientation, or gender identity.            Thank you!     Thank you for choosing [facility]  for your care. Our goal is always to provide you with excellent care. Hearing back from our patients is one way we can continue to improve our services. Please take a few minutes to complete the written survey that you may receive in the mail after your visit with us. Thank you!             Your Updated Medication List - Protect others around you: Learn how to safely use, store and throw away your medicines at www.disposemymeds.org.          This list is accurate as of 11/26/18 12:38 PM.  Always use your most recent med list.                   Brand Name Dispense Instructions for use Diagnosis    aspirin 81 MG tablet    ASA     Take 1 tablet by mouth daily.        blood glucose monitoring lancets     100 each    Use to test blood sugars 2 times daily or as directed.    Diabetes mellitus without complication (H)       blood glucose monitoring meter device kit    no brand specified    1 kit    Use to test blood sugar 2-4 times daily or as directed.    Increased glucose level       * blood glucose monitoring test strip    no brand specified    100 strip    Use to test blood sugars 2-4 times daily or as directed    Increased glucose level       * blood glucose monitoring test strip    no brand specified    100 strip    Use to test blood sugars 2 times daily or as directed    Diabetes mellitus without complication (H)       clotrimazole-betamethasone cream    LOTRISONE    15 g    Apply topically 2 times daily    Skin lesion       diclofenac 1 % topical gel    VOLTAREN    100 g     Apply 4 grams to affected area  four times daily using enclosed dosing card.    Primary osteoarthritis of both knees       FISH OIL PO      Take 1 g by mouth daily        GINSENG PO      Take by mouth daily    Stress, Increased glucose level       losartan 25 MG tablet    COZAAR    90 tablet    Take 1 tablet (25 mg) by mouth daily    Benign essential hypertension, Screen for colon cancer, Benign nodular prostatic hyperplasia without lower urinary tract symptoms, Skin lesion, Erectile dysfunction due to arterial insufficiency, Smoking, Screening for prostate cancer       metFORMIN 500 MG 24 hr tablet    GLUCOPHAGE-XR    90 tablet    Take 1 tablet (500 mg) by mouth daily (with dinner)    Type 2 diabetes mellitus with hyperglycemia, without long-term current use of insulin (H)       metoprolol succinate 25 MG 24 hr tablet    TOPROL XL    90 tablet    Take 1 tablet (25 mg) by mouth daily    Benign essential hypertension, Screen for colon cancer, Benign nodular prostatic hyperplasia without lower urinary tract symptoms, Skin lesion       MULTIVITAMINS PO      Take 1 tablet by mouth daily.        nicotine polacrilex 2 MG gum    NICORETTE    880 each    Place 1 each (2 mg) inside cheek as needed for smoking cessation *CHEW APPROX. 20 PCS PER DAY AS DIRECTED    Smoking, Erectile dysfunction due to arterial insufficiency, Benign essential hypertension, Screen for colon cancer, Benign nodular prostatic hyperplasia without lower urinary tract symptoms, Skin lesion, Screening for prostate cancer       olopatadine 0.1 % ophthalmic solution    PATANOL    1 Bottle    Place 1 drop into both eyes 2 times daily    Allergic conjunctivitis, bilateral       psyllium 58.6 % Powd    METAMUCIL     Take 2 teaspoonful by mouth daily        sildenafil 100 MG tablet    VIAGRA    8 tablet    Take 0.5-1 tablets ( mg) by mouth daily as needed    Erectile dysfunction due to arterial insufficiency, Benign essential hypertension, Screen for  colon cancer, Benign nodular prostatic hyperplasia without lower urinary tract symptoms, Skin lesion, Smoking, Screening for prostate cancer       * Notice:  This list has 2 medication(s) that are the same as other medications prescribed for you. Read the directions carefully, and ask your doctor or other care provider to review them with you.

## 2018-11-26 NOTE — LETTER
11/26/2018      RE: Sujata Valencia  [address] [place] [address]       November 26, 2018: Sujata Valencia is a 62 year old male who is seen in f/u up for f/u knee djd, for low back lumbar stenosis.      He was diagnosed with R sciatica in 1988. He had PT, which helped some. He had steroid inj to R SI joint and R hip (2012).  At that time he was referred for an SI joint injection and reported an 80% hip and back pain relief after this injection.  An SI joint xray showed no djd findings.  He reported a 65-70% hip pain relief after additional intraarticular hip injection in 2012. He did PT at the time with some improvement.  No h/o psoriasis, uveitis, or IBD.  No FH inherited arthritides.  In 2012 elevated CCP, neg HLA B27, Neg CRP, ESR, neg DS DNA. Neg Rheum factor. Recently in 2018 wnl SKIP, TSH, Vit D.     His current complaints are discomfort in his centralized low back as well as his bilateral calves.  He likes to walk for exercise and if he walks close to a mile he will start to get a numb and tingling sensation in the bilateral posterior calves.  He Has noted if he leans forward it tends to improve.  He will also get centralized low back discomfort.  If he is resting and inactive pain will not be present.  The more activity the more likely he is to have centralized low back discomfort.  He denies consistent radicular shooting pain into the legs.  He denies groin pain on the right.        RIGHT KNEE THREE VIEWS   11/20/2018 11:15 AM      HISTORY: Right knee pain.     COMPARISON: 9/26/2012         IMPRESSION: Mild osteoarthritis of the patellofemoral joint is new.  Slight narrowing of the medial knee joint compartment is also new.     ISABELA SANTOS MD        MR LUMBAR SPINE WITHOUT CONTRAST  11/20/2018 10:49 AM      HISTORY:  Spinal stenosis of lumbar region with neurogenic  claudication.     TECHNIQUE: Multiplanar multisequence images were obtained through the  lumbar spine without  contrast.     COMPARISON: 5/3/2011.     FINDINGS: Five lumbar type vertebral bodies are presumed. There is  multilevel disc space narrowing involving all levels. Discogenic  marrow changes are present at L1-L2, L2-L3, L3-L4, L4-L5, and L5-S1.  Posterior alignment is normal. The cauda equina nerve roots and conus  medullaris appear normal.     T12-L1: Normal.     L1-L2: Broad-based disc bulge and mild facet hypertrophy are present  causing some borderline to mild central canal stenosis and mild  right-sided foraminal stenosis.     L2-L3: Broad-based disc bulging and mild to moderate facet hypertrophy  are present causing some mild central canal stenosis and mild  bilateral neural foraminal stenosis.     L3-L4: Broad-based disc bulging or disc protrusion is present along  with moderate facet and ligamentum flavum hypertrophy. There is  moderate central canal stenosis and moderate bilateral neural  foraminal stenosis.     L4-L5: Broad-based disc bulge or disc protrusion is present along with  a more focal left posterolateral disc protrusion. There is also  moderate to severe facet hypertrophy and ligamentum flavum  hypertrophy. The findings are resulting in moderate to severe central  canal stenosis and moderate to severe right-sided foraminal stenosis.  There is also moderate left-sided foraminal stenosis.     L5-S1: Broad-based disc bulging or disc protrusion is present along  with a more focal right paramedian to posterolateral disc protrusion.  There is also moderate facet hypertrophy. There is secondary mild  central canal stenosis, moderate right lateral recess stenosis and  mild to moderate right foraminal stenosis.         IMPRESSION:  1. Multilevel degenerative disc and facet disease, most advanced at  L4-L5 where there is moderate to severe central canal stenosis,  moderate to severe right-sided foraminal stenosis and moderate  left-sided foraminal stenosis.  2. There is also some moderate right lateral  recess stenosis at L5-S1.     YONY LANG MD      Forward flexion of lumbar spine to touch shins.  Extension full, without limitation or discomfort.       Straight leg raise in seated position with chin-to-chest negative bilaterally.    Lower extremity strength is 5/5 symmetrically bilaterally to flexion and extension at hips, knees, ankles, including toe strength and foot evertor strength.     ADRIENNE test negative.  Normal ROM at hips bilaterally.  Nontender over bilateral SI joints.  Sacral compression without discomfort.  Spring testing of lumbar spine without discomfort at any level.     Inguinal pulses and posterior tibial pulses strong and symmetrical bilaterally.  No abdominal masses palpated.  He indicates he has had a normal colonoscopy.     Sensation to light touch intact bilateral lower extremities.     Skin overlying low back wnl.  Appropriate in conversation and affect     Assessment: Lumbar spinal stenosis.  Knee djd.    Plan: We went over the MRI results of the low back and the x-rays of the knees with the help of a model.  We discussed this with his son on the phone who is a physician.  His MRI shows severe spinal stenosis at the L4-L5 level which was also present on an MRI in 2011.  His x-rays of the knee show mild DJD in the joint space medially.  The patient declines surgical referral for his back.  We talked about the possibility of a epidural injection at the L4-L5 level for spinal stenosis, but he knows that I cannot guarantee that it provides pain relief in the setting.  We discussed physical therapy for lumbar spinal stenosis.  The patient would like to avoid the injection in the back and start with physical therapy.  He would like a topical medicine for his knees.  He has controlled diabetes with normal kidney function.  He would like some topical Voltaren for pain is not improved by Tylenol.  We discussed the possible risks of Voltaren even though it is a topical medicine that can still  have some effect on kidney function.  He plans on using it sparingly.  If he is not seeing improvements with physical therapy he will follow-up with me in clinic and if necessary sacroiliac joint injections bilaterally could be considered or alternatively an L4-L5 epidural injection.  Patient had no further questions.      Adan Daigle MD

## 2018-11-26 NOTE — PROGRESS NOTES
November 26, 2018: Sujata Valencia is a 62 year old male who is seen in f/u up for f/u knee djd, for low back lumbar stenosis.      He was diagnosed with R sciatica in 1988. He had PT, which helped some. He had steroid inj to R SI joint and R hip (2012).  At that time he was referred for an SI joint injection and reported an 80% hip and back pain relief after this injection.  An SI joint xray showed no djd findings.  He reported a 65-70% hip pain relief after additional intraarticular hip injection in 2012. He did PT at the time with some improvement.  No h/o psoriasis, uveitis, or IBD.  No FH inherited arthritides.  In 2012 elevated CCP, neg HLA B27, Neg CRP, ESR, neg DS DNA. Neg Rheum factor. Recently in 2018 wnl SKIP, TSH, Vit D.     His current complaints are discomfort in his centralized low back as well as his bilateral calves.  He likes to walk for exercise and if he walks close to a mile he will start to get a numb and tingling sensation in the bilateral posterior calves.  He Has noted if he leans forward it tends to improve.  He will also get centralized low back discomfort.  If he is resting and inactive pain will not be present.  The more activity the more likely he is to have centralized low back discomfort.  He denies consistent radicular shooting pain into the legs.  He denies groin pain on the right.        RIGHT KNEE THREE VIEWS   11/20/2018 11:15 AM      HISTORY: Right knee pain.     COMPARISON: 9/26/2012         IMPRESSION: Mild osteoarthritis of the patellofemoral joint is new.  Slight narrowing of the medial knee joint compartment is also new.     ISABELA SANTOS MD        MR LUMBAR SPINE WITHOUT CONTRAST  11/20/2018 10:49 AM      HISTORY:  Spinal stenosis of lumbar region with neurogenic  claudication.     TECHNIQUE: Multiplanar multisequence images were obtained through the  lumbar spine without contrast.     COMPARISON: 5/3/2011.     FINDINGS: Five lumbar type vertebral bodies are presumed.  There is  multilevel disc space narrowing involving all levels. Discogenic  marrow changes are present at L1-L2, L2-L3, L3-L4, L4-L5, and L5-S1.  Posterior alignment is normal. The cauda equina nerve roots and conus  medullaris appear normal.     T12-L1: Normal.     L1-L2: Broad-based disc bulge and mild facet hypertrophy are present  causing some borderline to mild central canal stenosis and mild  right-sided foraminal stenosis.     L2-L3: Broad-based disc bulging and mild to moderate facet hypertrophy  are present causing some mild central canal stenosis and mild  bilateral neural foraminal stenosis.     L3-L4: Broad-based disc bulging or disc protrusion is present along  with moderate facet and ligamentum flavum hypertrophy. There is  moderate central canal stenosis and moderate bilateral neural  foraminal stenosis.     L4-L5: Broad-based disc bulge or disc protrusion is present along with  a more focal left posterolateral disc protrusion. There is also  moderate to severe facet hypertrophy and ligamentum flavum  hypertrophy. The findings are resulting in moderate to severe central  canal stenosis and moderate to severe right-sided foraminal stenosis.  There is also moderate left-sided foraminal stenosis.     L5-S1: Broad-based disc bulging or disc protrusion is present along  with a more focal right paramedian to posterolateral disc protrusion.  There is also moderate facet hypertrophy. There is secondary mild  central canal stenosis, moderate right lateral recess stenosis and  mild to moderate right foraminal stenosis.         IMPRESSION:  1. Multilevel degenerative disc and facet disease, most advanced at  L4-L5 where there is moderate to severe central canal stenosis,  moderate to severe right-sided foraminal stenosis and moderate  left-sided foraminal stenosis.  2. There is also some moderate right lateral recess stenosis at L5-S1.     YONY LANG MD            Forward flexion of lumbar spine to touch  shins.  Extension full, without limitation or discomfort.       Straight leg raise in seated position with chin-to-chest negative bilaterally.    Lower extremity strength is 5/5 symmetrically bilaterally to flexion and extension at hips, knees, ankles, including toe strength and foot evertor strength.     ADRIENNE test negative.  Normal ROM at hips bilaterally.  Nontender over bilateral SI joints.  Sacral compression without discomfort.  Spring testing of lumbar spine without discomfort at any level.     Inguinal pulses and posterior tibial pulses strong and symmetrical bilaterally.  No abdominal masses palpated.  He indicates he has had a normal colonoscopy.     Sensation to light touch intact bilateral lower extremities.     Skin overlying low back wnl.  Appropriate in conversation and affect        Assessment: Lumbar spinal stenosis.  Knee djd.        Plan: We went over the MRI results of the low back and the x-rays of the knees with the help of a model.  We discussed this with his son on the phone who is a physician.  His MRI shows severe spinal stenosis at the L4-L5 level which was also present on an MRI in 2011.  His x-rays of the knee show mild DJD in the joint space medially.  The patient declines surgical referral for his back.  We talked about the possibility of a epidural injection at the L4-L5 level for spinal stenosis, but he knows that I cannot guarantee that it provides pain relief in the setting.  We discussed physical therapy for lumbar spinal stenosis.  The patient would like to avoid the injection in the back and start with physical therapy.  He would like a topical medicine for his knees.  He has controlled diabetes with normal kidney function.  He would like some topical Voltaren for pain is not improved by Tylenol.  We discussed the possible risks of Voltaren even though it is a topical medicine that can still have some effect on kidney function.  He plans on using it sparingly.  If he is not  seeing improvements with physical therapy he will follow-up with me in clinic and if necessary sacroiliac joint injections bilaterally could be considered or alternatively an L4-L5 epidural injection.  Patient had no further questions.

## 2018-11-27 ENCOUNTER — THERAPY VISIT (OUTPATIENT)
Dept: PHYSICAL THERAPY | Facility: CLINIC | Age: 63
End: 2018-11-27
Payer: COMMERCIAL

## 2018-11-27 DIAGNOSIS — M48.062 LUMBAR STENOSIS WITH NEUROGENIC CLAUDICATION: ICD-10-CM

## 2018-11-27 DIAGNOSIS — G89.29 CHRONIC BILATERAL LOW BACK PAIN WITH RIGHT-SIDED SCIATICA: ICD-10-CM

## 2018-11-27 DIAGNOSIS — M54.41 CHRONIC BILATERAL LOW BACK PAIN WITH RIGHT-SIDED SCIATICA: ICD-10-CM

## 2018-11-27 PROCEDURE — 97161 PT EVAL LOW COMPLEX 20 MIN: CPT | Mod: GP | Performed by: PHYSICAL THERAPIST

## 2018-11-27 PROCEDURE — 97110 THERAPEUTIC EXERCISES: CPT | Mod: GP | Performed by: PHYSICAL THERAPIST

## 2018-11-27 NOTE — PROGRESS NOTES
[facility] Initial Evaluation  Subjective:  Sujata Valencia is a 62 year old male.    Patient s chief complaints: LBP with infrequent R calf numbness/tingling; R knee joint pain.    Condition occurred due to lifting a heavy bag out of a trunk of her car 1986.  Date of Onset: LBP has been on going, just got fed up with it and went in to see Dr Daigle.  Was referred to PT on 11/26/18  Location of symptoms is B LB (shifts R/L PSIS SIJ areas), sometimes radiates laterally. Intermittent R calf numbness/tingling with walking--into the R calf .  Symptoms other than pain include: numbness/tingling   Quality of pain is /dullaching and frequency is intermittent.    Pain dependence on time of day is: not dependent.   Pain rating is: 3/10.    Symptoms are exacerbated by: raking, lifting, shoveling, vacuuming.    Symptoms are relieved by:  Standing posting arms and doing extension at counter.    Progression of symptoms is that symptoms are:  unchanged.  Imaging/Special tests include: MRI of back with main finding of stenosis, though mild disc bulges and protrusion at multiple levels as well.  R knee x-ray mild OA medial.  Previous treatments include: PT/chiropractor in past.   Patient reports that general health is: good.   Pertinent medical history includes:  Diabetes, numbness/tingling, OA.    Medical allergies includes: seasonal.   Surgical history includes: fistula.  Current medications include: NA  Current occupation is: retired  Work status is: retired  Primary job tasks include: none  Barriers include: none  Red flags include: none    Patient's expectations for therapy include: get small group of exercises to keep his back doing well.  Lose weight.    HPI                    Objective:  LUMBAR:    Posture: fair  Posture Correction: no effect  Relevant Lateral Shift: no    Neurological:    Motor Deficit:  Myotomes L R   L1-2 (hip flexion) 5 5   L3 (knee extension) 5 5   L4 (ankle DF) 5 5   L5 (g. toe  ext) 5 5   S1 (ankle PF or knee flex) 5 5     Sensory Deficit, Reflexes: intact light touch screen B LE dermatomes (notes recent diagnosis of pre diabetes with some tingling in both toes, likely related to that)    Dural Signs:   L R   Slump negative negative   SLR negative negative       AROM: (Major, Moderate, Minimal or Nil loss)  Movement Loss Dennis Mod Min Nil Pain   Flexion   X  To ankles, no pain   Extension   X  No pain   Side Gliding L    X Trace pain R LB   Side Gliding R    X R LBP     Repeated movement testing:   (During: produces, abolishes, increases, decreases, no effect, centralizing, peripheralizing; After: better, worse, no better, no worse, no effect, centralized, peripheralized)    Pre-test Symptoms Standing: dull tingling sensation in LB (wouldn't call it pain)   Symptoms During Symptoms After ROM increased ROM decreased No Effect   FIS        Rep FIS No effect No effect   X   EIS        Rep EIS decreased better   X   Pre-test Symptoms Lying: dull tingling sensation in LB (wouldn't call it pain)    Symptoms During Symptoms After ROM increased ROM decreased No Effect   ASHLEY        Rep ASHLEY decreased better   X   EIL        Rep EIL decreased better   X     Static Tests: prone on elbows x 2 minutes with no pain  Other Tests: P/A extension mobilization L4,5 feels good and results in increased ROM with press up, extension in lying after.     Provisional Classification: likely derangement (has both moderate-severe stenosis and disc protrusion on MRI, but actually feels better with extension on initial testing)  Principle of Management: will start with trial of repeated extension in standing, at counter or press up x 10 reps every few hours and monitor result.  Will work on posture and core strengthening once response to directional exercises is established.     System    Physical Exam    General     ROS    Assessment/Plan:    Patient is a 62 year old male with lumbar complaints.    Patient has the  following significant findings with corresponding treatment plan.                Diagnosis 1:  LBP with intermittent R radicular symptoms to calf    Pain -  hot/cold therapy, manual therapy and directional preference exercise  Decreased ROM/flexibility - manual therapy and therapeutic exercise  Decreased strength - therapeutic exercise and therapeutic activities  Decreased proprioception - neuro re-education and therapeutic activities  Decreased function - therapeutic activities  Impaired posture - neuro re-education    Therapy Evaluation Codes:   1) History comprised of:   Personal factors that impact the plan of care:      Time since onset of symptoms and None.    Comorbidity factors that impact the plan of care are:      None.     Medications impacting care: None.  2) Examination of Body Systems comprised of:   Body structures and functions that impact the plan of care:      Lumbar spine.   Activity limitations that impact the plan of care are:      Bending, Lifting and house cleaning, yard work.  3) Clinical presentation characteristics are:   Stable/Uncomplicated.  4) Decision-Making    Low complexity using standardized patient assessment instrument and/or measureable assessment of functional outcome.  Cumulative Therapy Evaluation is: Low complexity.    Previous and current functional limitations:  (See Goal Flow Sheet for this information)    Short term and Long term goals: (See Goal Flow Sheet for this information)     Communication ability:  Patient appears to be able to clearly communicate and understand verbal and written communication and follow directions correctly.  Treatment Explanation - The following has been discussed with the patient:   RX ordered/plan of care  Anticipated outcomes  Possible risks and side effects  This patient would benefit from PT intervention to resume normal activities.   Rehab potential is good.    Frequency:  1 X week, once daily  Duration:  for 6 weeks  Discharge Plan:   Achieve all LTG.  Independent in home treatment program.  Reach maximal therapeutic benefit.    Please refer to the daily flowsheet for treatment today, total treatment time and time spent performing 1:1 timed codes.

## 2018-11-28 PROBLEM — M54.41 CHRONIC BILATERAL LOW BACK PAIN WITH RIGHT-SIDED SCIATICA: Status: ACTIVE | Noted: 2018-11-28

## 2018-11-28 PROBLEM — M48.062 LUMBAR STENOSIS WITH NEUROGENIC CLAUDICATION: Status: ACTIVE | Noted: 2018-11-28

## 2018-11-28 PROBLEM — G89.29 CHRONIC BILATERAL LOW BACK PAIN WITH RIGHT-SIDED SCIATICA: Status: ACTIVE | Noted: 2018-11-28

## 2018-12-04 ENCOUNTER — THERAPY VISIT (OUTPATIENT)
Dept: PHYSICAL THERAPY | Facility: CLINIC | Age: 63
End: 2018-12-04
Payer: COMMERCIAL

## 2018-12-04 DIAGNOSIS — G89.29 CHRONIC BILATERAL LOW BACK PAIN WITH RIGHT-SIDED SCIATICA: ICD-10-CM

## 2018-12-04 DIAGNOSIS — M54.41 CHRONIC BILATERAL LOW BACK PAIN WITH RIGHT-SIDED SCIATICA: ICD-10-CM

## 2018-12-04 PROCEDURE — 97140 MANUAL THERAPY 1/> REGIONS: CPT | Mod: GP | Performed by: PHYSICAL THERAPIST

## 2018-12-04 PROCEDURE — 97110 THERAPEUTIC EXERCISES: CPT | Mod: GP | Performed by: PHYSICAL THERAPIST

## 2018-12-10 ENCOUNTER — OFFICE VISIT (OUTPATIENT)
Dept: INTERNAL MEDICINE | Facility: CLINIC | Age: 63
End: 2018-12-10
Payer: COMMERCIAL

## 2018-12-10 VITALS
SYSTOLIC BLOOD PRESSURE: 146 MMHG | WEIGHT: 201.7 LBS | HEART RATE: 90 BPM | HEIGHT: 71 IN | BODY MASS INDEX: 28.24 KG/M2 | DIASTOLIC BLOOD PRESSURE: 92 MMHG

## 2018-12-10 DIAGNOSIS — Z28.9 MISSED VACCINATION: ICD-10-CM

## 2018-12-10 DIAGNOSIS — R73.09 INCREASED GLUCOSE LEVEL: Primary | ICD-10-CM

## 2018-12-10 DIAGNOSIS — Z23 NEED FOR PROPHYLACTIC VACCINATION AND INOCULATION AGAINST INFLUENZA: ICD-10-CM

## 2018-12-10 DIAGNOSIS — R73.09 INCREASED GLUCOSE LEVEL: ICD-10-CM

## 2018-12-10 LAB
ALBUMIN SERPL-MCNC: 3.9 G/DL (ref 3.4–5)
ALP SERPL-CCNC: 78 U/L (ref 40–150)
ALT SERPL W P-5'-P-CCNC: 35 U/L (ref 0–70)
ANION GAP SERPL CALCULATED.3IONS-SCNC: 7 MMOL/L (ref 3–14)
AST SERPL W P-5'-P-CCNC: 16 U/L (ref 0–45)
BASOPHILS # BLD AUTO: 0.1 10E9/L (ref 0–0.2)
BASOPHILS NFR BLD AUTO: 1.1 %
BILIRUB SERPL-MCNC: 0.7 MG/DL (ref 0.2–1.3)
BUN SERPL-MCNC: 13 MG/DL (ref 7–30)
CALCIUM SERPL-MCNC: 8.9 MG/DL (ref 8.5–10.1)
CHLORIDE SERPL-SCNC: 102 MMOL/L (ref 94–109)
CO2 SERPL-SCNC: 27 MMOL/L (ref 20–32)
CREAT SERPL-MCNC: 0.81 MG/DL (ref 0.66–1.25)
DIFFERENTIAL METHOD BLD: NORMAL
EOSINOPHIL # BLD AUTO: 0.2 10E9/L (ref 0–0.7)
EOSINOPHIL NFR BLD AUTO: 3.9 %
ERYTHROCYTE [DISTWIDTH] IN BLOOD BY AUTOMATED COUNT: 12.1 % (ref 10–15)
GFR SERPL CREATININE-BSD FRML MDRD: >90 ML/MIN/1.7M2
GLUCOSE SERPL-MCNC: 204 MG/DL (ref 70–99)
HBA1C MFR BLD: 6.4 % (ref 0–5.6)
HCT VFR BLD AUTO: 47.5 % (ref 40–53)
HGB BLD-MCNC: 16 G/DL (ref 13.3–17.7)
IMM GRANULOCYTES # BLD: 0 10E9/L (ref 0–0.4)
IMM GRANULOCYTES NFR BLD: 0.4 %
LYMPHOCYTES # BLD AUTO: 2.3 10E9/L (ref 0.8–5.3)
LYMPHOCYTES NFR BLD AUTO: 40 %
MCH RBC QN AUTO: 29.6 PG (ref 26.5–33)
MCHC RBC AUTO-ENTMCNC: 33.7 G/DL (ref 31.5–36.5)
MCV RBC AUTO: 88 FL (ref 78–100)
MONOCYTES # BLD AUTO: 0.5 10E9/L (ref 0–1.3)
MONOCYTES NFR BLD AUTO: 8.5 %
NEUTROPHILS # BLD AUTO: 2.6 10E9/L (ref 1.6–8.3)
NEUTROPHILS NFR BLD AUTO: 46.1 %
NRBC # BLD AUTO: 0 10*3/UL
NRBC BLD AUTO-RTO: 0 /100
PLATELET # BLD AUTO: 178 10E9/L (ref 150–450)
POTASSIUM SERPL-SCNC: 4.3 MMOL/L (ref 3.4–5.3)
PROT SERPL-MCNC: 7.6 G/DL (ref 6.8–8.8)
RBC # BLD AUTO: 5.41 10E12/L (ref 4.4–5.9)
SODIUM SERPL-SCNC: 136 MMOL/L (ref 133–144)
WBC # BLD AUTO: 5.7 10E9/L (ref 4–11)

## 2018-12-10 ASSESSMENT — MIFFLIN-ST. JEOR: SCORE: 1734.29

## 2018-12-10 NOTE — PROGRESS NOTES
HPI:    Pt. Comes in for follow up today. Overall doing well. He has stopped drinking EtOH. He is exercising more. He is checking his Blood sugars and BP. He has some mild urinary sxs. He has some mild lower back pain. O/w no additional HEENT, cardiopulmonary, abdominal, , neurological complaints.    PE:    Vitals noted gen nad cooperative alert, HEENT oropharynx clear neck supple nl rom, LCTA, B, good air movement, RRR, S1, S2,. No MRG, no acute findings abdominal exam. No tenderness to palpation of lower back    A/P:    1. PSA checked at 0.30 6/8/2018. Will refer to Urology for possible BPH and he wants to wait on any surgery for now.   2. H/o myocarditis; see by Dr. Rich, Cardiology 6/28/2018; he had resting echo 6/27/2018: EF 45-50% with mild global hypokinesis  3. EtOH use/abuse; he states he has stopped drinking  4. Fatty liver; abdominal U/S 7/27/18 stable and seen in GI 7/2017; liver tests normal 8/8/2018  5. DM2; A1c 7.1% 8/8/2018 (down from 10.2% 6/8/2018). Seen by Dr. Ceja, Endo 8/22/2018 and he remains on Metformin. Placed future order for A1C today 12/10/2018 quite good at 6.4%  6. Colonoscopy 5/2017 repeat in 3 years  7. Seen in Dermatology 5/2017  8. Pt was seen 11/26/2018 by Dr. Pilo salcedo and had MRI scan 11/20/2018; he currently is getting PT  9.  New Shingles vaccine today    See me back 4/2019    Total time spent 25 minutes.  More than 50% of the time spent with Mr. Valencia on counseling / coordinating his care

## 2018-12-10 NOTE — NURSING NOTE
Shingrix shot given without problems, patient tolerated procedure well.Priscila Puri LPN 1:06 PM on 12/10/2018      The following medication was given:     MEDICATION: Adjuvant Suspension Component  ROUTE: IM  SITE: Deltoid - Left  DOSE: 0.5 mg  LOT #: [id number]  :  documistic  EXPIRATION DATE:  04/18/21  NDC#: 44893-457-62  Priscila Puri LPN 1:08 PM on 12/10/2018

## 2018-12-10 NOTE — NURSING NOTE
Chief Complaint   Patient presents with     Recheck Medication       Emy Mathis LPN at 12:01 PM on December 10, 2018

## 2018-12-26 ENCOUNTER — THERAPY VISIT (OUTPATIENT)
Dept: PHYSICAL THERAPY | Facility: CLINIC | Age: 63
End: 2018-12-26
Payer: COMMERCIAL

## 2018-12-26 DIAGNOSIS — G89.29 CHRONIC BILATERAL LOW BACK PAIN WITH RIGHT-SIDED SCIATICA: ICD-10-CM

## 2018-12-26 DIAGNOSIS — M54.41 CHRONIC BILATERAL LOW BACK PAIN WITH RIGHT-SIDED SCIATICA: ICD-10-CM

## 2018-12-26 PROCEDURE — 97110 THERAPEUTIC EXERCISES: CPT | Mod: GP | Performed by: PHYSICAL THERAPIST

## 2018-12-26 PROCEDURE — 97140 MANUAL THERAPY 1/> REGIONS: CPT | Mod: GP | Performed by: PHYSICAL THERAPIST

## 2019-01-08 ENCOUNTER — THERAPY VISIT (OUTPATIENT)
Dept: PHYSICAL THERAPY | Facility: CLINIC | Age: 64
End: 2019-01-08
Payer: COMMERCIAL

## 2019-01-08 DIAGNOSIS — M54.41 CHRONIC BILATERAL LOW BACK PAIN WITH RIGHT-SIDED SCIATICA: ICD-10-CM

## 2019-01-08 DIAGNOSIS — G89.29 CHRONIC BILATERAL LOW BACK PAIN WITH RIGHT-SIDED SCIATICA: ICD-10-CM

## 2019-01-08 PROCEDURE — 97110 THERAPEUTIC EXERCISES: CPT | Mod: GP | Performed by: PHYSICAL THERAPIST

## 2019-01-08 PROCEDURE — 97140 MANUAL THERAPY 1/> REGIONS: CPT | Mod: GP | Performed by: PHYSICAL THERAPIST

## 2019-01-15 ENCOUNTER — THERAPY VISIT (OUTPATIENT)
Dept: PHYSICAL THERAPY | Facility: CLINIC | Age: 64
End: 2019-01-15
Payer: COMMERCIAL

## 2019-01-15 ENCOUNTER — MYC MEDICAL ADVICE (OUTPATIENT)
Dept: ORTHOPEDICS | Facility: CLINIC | Age: 64
End: 2019-01-15

## 2019-01-15 DIAGNOSIS — G89.29 CHRONIC BILATERAL LOW BACK PAIN WITH RIGHT-SIDED SCIATICA: ICD-10-CM

## 2019-01-15 DIAGNOSIS — M17.0 PRIMARY OSTEOARTHRITIS OF BOTH KNEES: ICD-10-CM

## 2019-01-15 DIAGNOSIS — M54.41 CHRONIC BILATERAL LOW BACK PAIN WITH RIGHT-SIDED SCIATICA: ICD-10-CM

## 2019-01-15 PROCEDURE — 97110 THERAPEUTIC EXERCISES: CPT | Mod: GP | Performed by: PHYSICAL THERAPIST

## 2019-01-15 PROCEDURE — 97140 MANUAL THERAPY 1/> REGIONS: CPT | Mod: GP | Performed by: PHYSICAL THERAPIST

## 2019-05-06 ENCOUNTER — ALLIED HEALTH/NURSE VISIT (OUTPATIENT)
Dept: INTERNAL MEDICINE | Facility: CLINIC | Age: 64
End: 2019-05-06
Payer: COMMERCIAL

## 2019-05-06 DIAGNOSIS — Z23 NEED FOR SHINGLES VACCINE: Primary | ICD-10-CM

## 2019-05-06 NOTE — NURSING NOTE
Sujata Valencia comes into clinic today at the request of Dr. Harris Ordering Provider for 2nd Shingrix Vaccine.     See immunization list for administration details.  Patient tolerated injection well.    This service provided today was under the supervising provider of the day Dr. Harris, who was available if needed.    Modesta Sainz LPN at 2:19 PM on 5/6/2019.

## 2019-05-15 ENCOUNTER — DOCUMENTATION ONLY (OUTPATIENT)
Dept: CARE COORDINATION | Facility: CLINIC | Age: 64
End: 2019-05-15

## 2019-06-11 ENCOUNTER — OFFICE VISIT (OUTPATIENT)
Dept: OPHTHALMOLOGY | Facility: CLINIC | Age: 64
End: 2019-06-11
Attending: OPHTHALMOLOGY
Payer: COMMERCIAL

## 2019-06-11 DIAGNOSIS — E11.9 DIABETES MELLITUS TYPE 2 WITHOUT RETINOPATHY (H): Primary | ICD-10-CM

## 2019-06-11 DIAGNOSIS — H10.13 ALLERGIC CONJUNCTIVITIS, BILATERAL: ICD-10-CM

## 2019-06-11 DIAGNOSIS — H52.4 PRESBYOPIA: ICD-10-CM

## 2019-06-11 PROCEDURE — G0463 HOSPITAL OUTPT CLINIC VISIT: HCPCS | Mod: ZF

## 2019-06-11 RX ORDER — OLOPATADINE HYDROCHLORIDE 1 MG/ML
1 SOLUTION/ DROPS OPHTHALMIC 2 TIMES DAILY
Qty: 1 BOTTLE | Refills: 11 | Status: SHIPPED | OUTPATIENT
Start: 2019-06-11 | End: 2019-08-28

## 2019-06-11 ASSESSMENT — VISUAL ACUITY
OD_SC: 20/20
METHOD: SNELLEN - LINEAR
OD_SC+: -1
OS_SC: 20/15
OS_SC+: -2

## 2019-06-11 ASSESSMENT — EXTERNAL EXAM - RIGHT EYE: OD_EXAM: NORMAL

## 2019-06-11 ASSESSMENT — TONOMETRY
OD_IOP_MMHG: 19
OS_IOP_MMHG: 22
IOP_METHOD: TONOPEN

## 2019-06-11 ASSESSMENT — SLIT LAMP EXAM - LIDS
COMMENTS: NORMAL
COMMENTS: NORMAL

## 2019-06-11 ASSESSMENT — EXTERNAL EXAM - LEFT EYE: OS_EXAM: NORMAL

## 2019-06-11 ASSESSMENT — CUP TO DISC RATIO
OD_RATIO: 0.2
OS_RATIO: 0.2

## 2019-06-11 ASSESSMENT — CONF VISUAL FIELD
OD_NORMAL: 1
OS_NORMAL: 1

## 2019-06-11 NOTE — NURSING NOTE
Chief Complaints and History of Present Illnesses   Patient presents with     Eye Exam For Diabetes     Chief Complaint(s) and History of Present Illness(es)     Eye Exam For Diabetes     Laterality: both eyes    Onset: 1 year ago    Associated symptoms: Negative for eye pain, flashes and floaters    Pain scale: 0/10              Comments     Borderline type 2 diabetes mellitus controlled by diet and medicine  Pt noting a little more difficulty with reading while at the airport, etc. - distance vision seems fine  Pt reports having some eye allergies but used the prescribed drops    Maria Teresa SOW 8:02 AM June 11, 2019

## 2019-06-11 NOTE — PROGRESS NOTES
NAYELY Valencia is a 63 year old male here for full eye exam. He feels his vision is overall good and stable in both eyes at near and distance. He uses OTC readers for near vision which overall work well, although he had some difficulty with very fine print when reading something at the airport recently. No eye pain, redness, discharge. No flashes/floaters. He have bilateral eye itching associated with seasonal allergies in spring and fall. He uses olopatadine BID for this during allergy season. This works relatively well for him.     Assessment & Plan    (R73.03) Borderline type 2 diabetes mellitus  (primary encounter diagnosis)  Comment: He has been followed for this since 2012.On metformin. Last A1c around 6 per patient. No diabetic retinopathy.  Plan: Discussed the importance of tight blood glucose control in the prevention of diabetic retinopathy. Recommend yearly dilated eye exam.    (H10.13) Allergic conjunctivitis, bilateral  Comment: Seasonal  Plan: Patanol refilled    (H52.4) Presbyopia OU  Comment: Good distance vision without correction  Plan: Ok to continue with OTC readers, can try to increase to +2.50    -----------------------------------------------------------------------------------    Patient disposition:   Return in about 1 year (around 6/11/2020). or sooner as needed.    Teaching statement:  Complete documentation of historical and exam elements from today's encounter can be found in the full encounter summary report (not reduplicated in this progress note). I personally obtained the chief complaint(s) and history of present illness.  I confirmed and edited as necessary the review of systems, past medical/surgical history, family history, social history, and examination findings as documented by others; and I examined the patient myself. I personally reviewed the relevant tests, images, and reports as documented above.     I formulated and edited as necessary the assessment and plan  and discussed the findings and management plan with the patient and family.    Maria Teresa Cabrera MD  Comprehensive Ophthalmology & Ocular Pathology  Department of Ophthalmology and Visual Neurosciences  [email or web address]  Pager [phone]

## 2019-06-12 ENCOUNTER — OFFICE VISIT (OUTPATIENT)
Dept: INTERNAL MEDICINE | Facility: CLINIC | Age: 64
End: 2019-06-12
Payer: COMMERCIAL

## 2019-06-12 VITALS
BODY MASS INDEX: 28.95 KG/M2 | HEIGHT: 71 IN | WEIGHT: 206.8 LBS | DIASTOLIC BLOOD PRESSURE: 81 MMHG | SYSTOLIC BLOOD PRESSURE: 125 MMHG | HEART RATE: 99 BPM | OXYGEN SATURATION: 97 %

## 2019-06-12 DIAGNOSIS — Z12.11 SCREEN FOR COLON CANCER: ICD-10-CM

## 2019-06-12 DIAGNOSIS — N52.01 ERECTILE DYSFUNCTION DUE TO ARTERIAL INSUFFICIENCY: ICD-10-CM

## 2019-06-12 DIAGNOSIS — N40.0 BENIGN NODULAR PROSTATIC HYPERPLASIA WITHOUT LOWER URINARY TRACT SYMPTOMS: ICD-10-CM

## 2019-06-12 DIAGNOSIS — E11.65 TYPE 2 DIABETES MELLITUS WITH HYPERGLYCEMIA, WITHOUT LONG-TERM CURRENT USE OF INSULIN (H): ICD-10-CM

## 2019-06-12 DIAGNOSIS — L98.9 SKIN LESION: ICD-10-CM

## 2019-06-12 DIAGNOSIS — I10 BENIGN ESSENTIAL HYPERTENSION: ICD-10-CM

## 2019-06-12 DIAGNOSIS — R73.09 INCREASED GLUCOSE LEVEL: Primary | ICD-10-CM

## 2019-06-12 DIAGNOSIS — F17.200 SMOKING: ICD-10-CM

## 2019-06-12 DIAGNOSIS — Z13.5 SCREENING FOR DIABETIC RETINOPATHY: ICD-10-CM

## 2019-06-12 DIAGNOSIS — Z12.5 SCREENING FOR PROSTATE CANCER: ICD-10-CM

## 2019-06-12 RX ORDER — SILDENAFIL 100 MG/1
50-100 TABLET, FILM COATED ORAL DAILY PRN
Qty: 8 TABLET | Refills: 12 | Status: SHIPPED | OUTPATIENT
Start: 2019-06-12 | End: 2020-07-14

## 2019-06-12 RX ORDER — METFORMIN HCL 500 MG
500 TABLET, EXTENDED RELEASE 24 HR ORAL
Qty: 90 TABLET | Refills: 3 | Status: SHIPPED | OUTPATIENT
Start: 2019-06-12 | End: 2019-10-25

## 2019-06-12 RX ORDER — LOSARTAN POTASSIUM 25 MG/1
25 TABLET ORAL DAILY
Qty: 90 TABLET | Refills: 3 | Status: SHIPPED | OUTPATIENT
Start: 2019-06-12 | End: 2020-11-17

## 2019-06-12 ASSESSMENT — ENCOUNTER SYMPTOMS
EYE IRRITATION: 1
EYE PAIN: 0
EYE WATERING: 0
EYE REDNESS: 1
DOUBLE VISION: 0

## 2019-06-12 ASSESSMENT — ANXIETY QUESTIONNAIRES
5. BEING SO RESTLESS THAT IT IS HARD TO SIT STILL: NOT AT ALL
3. WORRYING TOO MUCH ABOUT DIFFERENT THINGS: NOT AT ALL
6. BECOMING EASILY ANNOYED OR IRRITABLE: SEVERAL DAYS
IF YOU CHECKED OFF ANY PROBLEMS ON THIS QUESTIONNAIRE, HOW DIFFICULT HAVE THESE PROBLEMS MADE IT FOR YOU TO DO YOUR WORK, TAKE CARE OF THINGS AT HOME, OR GET ALONG WITH OTHER PEOPLE: NOT DIFFICULT AT ALL
1. FEELING NERVOUS, ANXIOUS, OR ON EDGE: NOT AT ALL
GAD7 TOTAL SCORE: 1
2. NOT BEING ABLE TO STOP OR CONTROL WORRYING: NOT AT ALL
7. FEELING AFRAID AS IF SOMETHING AWFUL MIGHT HAPPEN: NOT AT ALL

## 2019-06-12 ASSESSMENT — ACTIVITIES OF DAILY LIVING (ADL)
IN_THE_PAST_7_DAYS,_DID_YOU_NEED_HELP_FROM_OTHERS_TO_PERFORM_EVERYDAY_ACTIVITIES_SUCH_AS_EATING,_GETTING_DRESSED,_GROOMING,_BATHING,_WALKING,_OR_USING_THE_TOILET: N
IN_THE_PAST_7_DAYS,_DID_YOU_NEED_HELP_FROM_OTHERS_TO_TAKE_CARE_OF_THINGS_SUCH_AS_LAUNDRY_AND_HOUSEKEEPING,_BANKING,_SHOPPING,_USING_THE_TELEPHONE,_FOOD_PREPARATION,_TRANSPORTATION,_OR_TAKING_YOUR_OWN_MEDICATIONS?: N

## 2019-06-12 ASSESSMENT — PATIENT HEALTH QUESTIONNAIRE - PHQ9
5. POOR APPETITE OR OVEREATING: NOT AT ALL
SUM OF ALL RESPONSES TO PHQ QUESTIONS 1-9: 2

## 2019-06-12 ASSESSMENT — MIFFLIN-ST. JEOR: SCORE: 1745.17

## 2019-06-12 ASSESSMENT — PAIN SCALES - GENERAL: PAINLEVEL: NO PAIN (0)

## 2019-06-12 NOTE — NURSING NOTE
"63 year old  Chief Complaint   Patient presents with     Physical     Pt is here for a physical.       Blood pressure 142/83, pulse 99, height 1.803 m (5' 11\"), weight 93.8 kg (206 lb 12.8 oz), SpO2 97 %. Body mass index is 28.84 kg/m .  BP completed using cuff size:      Alicia Smith, [facility]  June 12, 2019 12:03 PM  "

## 2019-06-12 NOTE — PROGRESS NOTES
HPI:    Pt. Comes in for follow up today. Overall doing well. He has stopped drinking EtOH but states he was in [place] and [place] recently and did have some alcohol.  He is exercising more. He is checking his Blood sugars and BP. While in [place] his Metformin was increased to 500 mg twice a day.  He has some mild urinary sxs. O/w no additional HEENT, cardiopulmonary, abdominal, , neurological complaints.    PE:    Vitals noted gen nad cooperative alert, HEENT oropharynx clear neck supple nl rom, LCTA, B, good air movement, RRR, S1, S2,. No MRG, no acute findings abdominal exam. No tenderness to palpation of lower back    A/P:    1. PSA checked at 0.30 6/8/2018. Will refer to Urology for possible BPH and he wants to wait on any surgery for now. Ordered PSA today 6/12/2019  2. H/o myocarditis; see by Dr. Rich, Cardiology 6/28/2018; he had resting echo 6/27/2018: EF 45-50% with mild global hypokinesis  3. EtOH use/abuse; he states he has stopped drinking or at least cut down  4. Fatty liver; abdominal U/S 7/27/18 stable and seen in GI 7/2017; liver tests normal 8/8/2018  5. DM2; A1c 7.1% 8/8/2018 (down from 10.2% 6/8/2018). Seen by Dr. Ceja, Endo 8/22/2018 and he remains on Metformin. Placed future order for A1C today 12/10/2018 quite good at 6.4%  6. Colonoscopy 5/2017 repeat in 3 years  7. Seen in Dermatology 5/2017  8. Pt was seen 11/26/2018 by Dr. Pilo salcedo and had MRI scan 11/20/2018.  9.  He is due for Td 10/2019 and Pneumococcal 23; he wants to wait until this fall       Total time spent 25 minutes.  More than 50% of the time spent with Mr. Valencia on counseling / coordinating his care

## 2019-06-12 NOTE — PATIENT INSTRUCTIONS
[facility] Center Phone Number [phone]  [facility] Medication Refill Request Information:  * Please contact your pharmacy regarding ANY request for medication refills.  ** [facility] Prescription Fax = [phone]  * Please allow 3 business days for routine medication refills.  * Please allow 5 business days for controlled substance medication refills.     [facility] Test Result notification information:  *You will be notified with in 7-10 days of your appointment day regarding the results of your test.  If you are on MyChart you will be notified as soon as the provider has reviewed the results and signed off on them.

## 2019-06-13 ASSESSMENT — ANXIETY QUESTIONNAIRES: GAD7 TOTAL SCORE: 1

## 2019-06-14 DIAGNOSIS — R73.09 INCREASED GLUCOSE LEVEL: ICD-10-CM

## 2019-06-14 DIAGNOSIS — I10 BENIGN ESSENTIAL HYPERTENSION: ICD-10-CM

## 2019-06-14 LAB
ALBUMIN SERPL-MCNC: 4.1 G/DL (ref 3.4–5)
ALBUMIN UR-MCNC: NEGATIVE MG/DL
ALP SERPL-CCNC: 85 U/L (ref 40–150)
ALT SERPL W P-5'-P-CCNC: 52 U/L (ref 0–70)
ANION GAP SERPL CALCULATED.3IONS-SCNC: 5 MMOL/L (ref 3–14)
APPEARANCE UR: CLEAR
AST SERPL W P-5'-P-CCNC: 18 U/L (ref 0–45)
BASOPHILS # BLD AUTO: 0 10E9/L (ref 0–0.2)
BASOPHILS NFR BLD AUTO: 0.6 %
BILIRUB SERPL-MCNC: 0.7 MG/DL (ref 0.2–1.3)
BILIRUB UR QL STRIP: NEGATIVE
BUN SERPL-MCNC: 14 MG/DL (ref 7–30)
CALCIUM SERPL-MCNC: 9.1 MG/DL (ref 8.5–10.1)
CHLORIDE SERPL-SCNC: 106 MMOL/L (ref 94–109)
CHOLEST SERPL-MCNC: 209 MG/DL
CO2 SERPL-SCNC: 25 MMOL/L (ref 20–32)
COLOR UR AUTO: YELLOW
CREAT SERPL-MCNC: 0.79 MG/DL (ref 0.66–1.25)
CREAT UR-MCNC: 126 MG/DL
DIFFERENTIAL METHOD BLD: NORMAL
EOSINOPHIL # BLD AUTO: 0.3 10E9/L (ref 0–0.7)
EOSINOPHIL NFR BLD AUTO: 3.9 %
ERYTHROCYTE [DISTWIDTH] IN BLOOD BY AUTOMATED COUNT: 11.8 % (ref 10–15)
GFR SERPL CREATININE-BSD FRML MDRD: >90 ML/MIN/{1.73_M2}
GLUCOSE SERPL-MCNC: 126 MG/DL (ref 70–99)
GLUCOSE UR STRIP-MCNC: NEGATIVE MG/DL
HBA1C MFR BLD: 6.5 % (ref 0–5.6)
HCT VFR BLD AUTO: 47.9 % (ref 40–53)
HDLC SERPL-MCNC: 50 MG/DL
HGB BLD-MCNC: 16.4 G/DL (ref 13.3–17.7)
HGB UR QL STRIP: NEGATIVE
IMM GRANULOCYTES # BLD: 0.1 10E9/L (ref 0–0.4)
IMM GRANULOCYTES NFR BLD: 0.7 %
KETONES UR STRIP-MCNC: NEGATIVE MG/DL
LDLC SERPL CALC-MCNC: 134 MG/DL
LEUKOCYTE ESTERASE UR QL STRIP: NEGATIVE
LYMPHOCYTES # BLD AUTO: 2.2 10E9/L (ref 0.8–5.3)
LYMPHOCYTES NFR BLD AUTO: 31.4 %
MCH RBC QN AUTO: 31 PG (ref 26.5–33)
MCHC RBC AUTO-ENTMCNC: 34.2 G/DL (ref 31.5–36.5)
MCV RBC AUTO: 91 FL (ref 78–100)
MICROALBUMIN UR-MCNC: 6 MG/L
MICROALBUMIN/CREAT UR: 4.65 MG/G CR (ref 0–17)
MONOCYTES # BLD AUTO: 0.6 10E9/L (ref 0–1.3)
MONOCYTES NFR BLD AUTO: 9.1 %
MUCOUS THREADS #/AREA URNS LPF: PRESENT /LPF
NEUTROPHILS # BLD AUTO: 3.7 10E9/L (ref 1.6–8.3)
NEUTROPHILS NFR BLD AUTO: 54.3 %
NITRATE UR QL: NEGATIVE
NONHDLC SERPL-MCNC: 158 MG/DL
NRBC # BLD AUTO: 0 10*3/UL
NRBC BLD AUTO-RTO: 0 /100
PH UR STRIP: 7 PH (ref 5–7)
PLATELET # BLD AUTO: 172 10E9/L (ref 150–450)
POTASSIUM SERPL-SCNC: 4.4 MMOL/L (ref 3.4–5.3)
PROT SERPL-MCNC: 7.8 G/DL (ref 6.8–8.8)
PSA SERPL-MCNC: 0.31 UG/L (ref 0–4)
RBC # BLD AUTO: 5.29 10E12/L (ref 4.4–5.9)
RBC #/AREA URNS AUTO: 0 /HPF (ref 0–2)
SODIUM SERPL-SCNC: 136 MMOL/L (ref 133–144)
SOURCE: ABNORMAL
SP GR UR STRIP: 1.01 (ref 1–1.03)
TRIGL SERPL-MCNC: 123 MG/DL
UROBILINOGEN UR STRIP-MCNC: 0 MG/DL (ref 0–2)
WBC # BLD AUTO: 6.9 10E9/L (ref 4–11)
WBC #/AREA URNS AUTO: 0 /HPF (ref 0–5)

## 2019-07-12 ENCOUNTER — PRE VISIT (OUTPATIENT)
Dept: INTERNAL MEDICINE | Facility: CLINIC | Age: 64
End: 2019-07-12

## 2019-07-12 NOTE — TELEPHONE ENCOUNTER
[facility]  Dept: [phone]     Patient: Sujata Valencia   : 1955  MRN: [id number]  Encounter: 19       Pre Visit Assessment    Health Maintenance Due   Topic Date Due     DIABETIC FOOT EXAM  1955     HIV SCREENING  1968     AORTIC ANEURYSM SCREENING (SYSTEM ASSIGNED)  2018     MEDICARE ANNUAL WELLNESS VISIT  2019     Chart Reviewed for Labs needed/Health Maintenance: Yes   If labs needed, orders placed:  No labs needed ,   Lab appointment scheduled:  No    Notes:  Patient confirmed they will attend appointment:  N/A  Appointment rescheduled?  No      Chang Hall at 4:51 PM on 2019       49F with history of hypothyroidism on Synthroid and 3 episodes of diplopia and nystagmus year and half, treated with Prednisone 20 mg po PRN. The pt is being followed by Ophthalmologist Dr Bogdan Hartman, for the diplopia and nystagmus and had a workup which included CT scan, MRI, with no diagnosis. Ophthalmologist suggest possible demyelination but nothing conclusive.  The pt awoke on 3/15 and experienced her 4th episode of diplopia and nystagmus.   She took Prednisone 20 mg po x 1 with no relief. She went to her ophthalmologist and had vision test done. She was found to have torsional nystagmus and was sent to the Ed to r/o brainstem stroke or demyelination. The pt has positive photophobia, dizziness, nausea, and worsening diplopia when looking to the left. Denies HA, tinnitus, occular discharge, diaphoresis, chills, chest pain, palpitations, SOB, vomit, dysuria, falls. Prior to this event, the pt last episode occurred 12/2017, lasted 15 minutes and subsided with Prednisone. The pt denies family history of MS and other medical conditions.   In the Ed, the pt was seen by neurology. Their consult is in the pt medical record.

## 2019-07-26 ENCOUNTER — OFFICE VISIT (OUTPATIENT)
Dept: INTERNAL MEDICINE | Facility: CLINIC | Age: 64
End: 2019-07-26
Payer: COMMERCIAL

## 2019-07-26 VITALS
DIASTOLIC BLOOD PRESSURE: 74 MMHG | WEIGHT: 207.4 LBS | RESPIRATION RATE: 16 BRPM | HEART RATE: 87 BPM | SYSTOLIC BLOOD PRESSURE: 120 MMHG | BODY MASS INDEX: 28.93 KG/M2

## 2019-07-26 DIAGNOSIS — R07.89 CHEST HEAVINESS: Primary | ICD-10-CM

## 2019-07-26 ASSESSMENT — PAIN SCALES - GENERAL: PAINLEVEL: NO PAIN (0)

## 2019-07-26 NOTE — PATIENT INSTRUCTIONS
[facility] Center Phone Number [phone]  [facility] Medication Refill Request Information:  * Please contact your pharmacy regarding ANY request for medication refills.  ** [facility] Fax = [phone]  * Please allow 3 business days for routine medication refills.  * Please allow 5 business days for controlled substance medication refills.     [facility] Test Result notification information:  *You will be notified with in 7-10 days of your appointment day regarding the results of your test.  If you are on MyChart you will be notified as soon as the provider has reviewed the results and signed off on them.

## 2019-07-26 NOTE — NURSING NOTE
"Chief Complaint   Patient presents with     Recheck Medication     Pt is here for a medication follow up BS- 122 fasting per pt       Vital signs:      BP: 119/76(clinic bp machine) Pulse: 92   Resp: 16         Weight: 94.1 kg (207 lb 6.4 oz)  Estimated body mass index is 28.93 kg/m  as calculated from the following:    Height as of 6/12/19: 1.803 m (5' 11\").    Weight as of this encounter: 94.1 kg (207 lb 6.4 oz).       Melina Ferraro CMA at 12:40 PM on 7/26/2019    "

## 2019-07-26 NOTE — PROGRESS NOTES
HPI:    Pt. Comes in for follow up today. Overall doing well. He has stopped drinking EtOH but states he was in [place] and [place] recently and did have some alcohol.  He is exercising more. He is checking his Blood sugars and BP. While in [place] his Metformin was increased to 500 mg twice a day.  He has some mild urinary sxs. O/w no additional HEENT, cardiopulmonary, abdominal, , neurological complaints. He states a few days ago a brief episode of chest pressure that was relieved with burping. No exertional. No further sxs.     PE:    Vitals noted gen nad cooperative alert, HEENT oropharynx clear neck supple nl rom, LCTA, B, good air movement, RRR, S1, S2,. No MRG, no acute findings abdominal exam. No tenderness to palpation of lower back    EKG: SR at 92; no significant change from 8/20/2014    A/P:    1. PSA checked at 0.31 on 6/14/2019. Will refer to Urology for possible BPH and he wants to wait on any surgery for now.    2. H/o myocarditis; see by Dr. Rich, Cardiology 6/28/2018; he had resting echo 6/27/2018: EF 45-50% with mild global hypokinesis  3. EtOH use/abuse; he states he has stopped drinking or at least cut down  4. Fatty liver; abdominal U/S 7/27/18 stable and seen in GI 7/2017; liver tests normal 8/8/2018  5. DM2;  Seen by Dr. Ceja, Endo 8/22/2018 and he remains on Metformin.  A1C was quite good at 6.5% on 6/14/2019  6. Colonoscopy 5/2017 repeat in 3 years  7. Seen in Dermatology 5/2017  8. Pt was seen 11/26/2018 by Dr. Pilo salcedo and had MRI scan 11/20/2018.  9.  He is due for Td 10/2019 and Pneumococcal 23; he wants to wait until this fall   10. Lipids on 6/14/2019:  and HDL 50; He does NOT want to start on cholesterol medication at this time      Total time spent 25 minutes.  More than 50% of the time spent with Mr. Valencia on counseling / coordinating his care

## 2019-07-27 ENCOUNTER — OFFICE VISIT (OUTPATIENT)
Dept: URGENT CARE | Facility: URGENT CARE | Age: 64
End: 2019-07-27
Payer: COMMERCIAL

## 2019-07-27 VITALS
HEART RATE: 95 BPM | OXYGEN SATURATION: 98 % | BODY MASS INDEX: 29.01 KG/M2 | DIASTOLIC BLOOD PRESSURE: 82 MMHG | TEMPERATURE: 98.4 F | SYSTOLIC BLOOD PRESSURE: 130 MMHG | WEIGHT: 208 LBS

## 2019-07-27 DIAGNOSIS — S05.02XA ABRASION OF LEFT CORNEA, INITIAL ENCOUNTER: Primary | ICD-10-CM

## 2019-07-27 LAB — INTERPRETATION ECG - MUSE: NORMAL

## 2019-07-27 PROCEDURE — 99213 OFFICE O/P EST LOW 20 MIN: CPT

## 2019-07-27 RX ORDER — POLYMYXIN B SULFATE AND TRIMETHOPRIM 1; 10000 MG/ML; [USP'U]/ML
1-2 SOLUTION OPHTHALMIC EVERY 6 HOURS
Qty: 10 ML | Refills: 0 | Status: SHIPPED | OUTPATIENT
Start: 2019-07-27

## 2019-07-27 NOTE — PROGRESS NOTES
Subjective     Sujata Valencia is a 63 year old male who presents to clinic today for the following health issues:    HPI   Was working outdoors 3 weeks ago and felt something got in eye.  Washed it out then but has been nagging him ever since.  Notes increased pain and watering of LEFT eye.  No vision changes.      Patient Active Problem List   Diagnosis     Chronic bilateral low back pain with right-sided sciatica     Lumbar stenosis with neurogenic claudication     Past Surgical History:   Procedure Laterality Date     COLONOSCOPY      fistula      CORONARY ANGIOGRAPHY ADULT ORDER  2014    normal coronary arteries       Social History     Tobacco Use     Smoking status: Former Smoker     Packs/day: 1.00     Years: 25.00     Pack years: 25.00     Start date:      Last attempt to quit: 10/31/2001     Years since quittin.7     Smokeless tobacco: Never Used   Substance Use Topics     Alcohol use: Yes     Comment: scotch 2 times a week, wine 5 days per week at drs request     Family History   Problem Relation Age of Onset     Osteoporosis Mother      Diabetes Maternal Grandmother      Glaucoma No family hx of      Macular Degeneration No family hx of          Current Outpatient Medications   Medication Sig Dispense Refill     trimethoprim-polymyxin b (POLYTRIM) 31932-7.1 UNIT/ML-% ophthalmic solution Place 1-2 drops Into the left eye every 6 hours 10 mL 0     aspirin 81 MG tablet Take 1 tablet by mouth daily.       blood glucose monitoring (NO BRAND SPECIFIED) meter device kit Use to test blood sugar 2-4 times daily or as directed. 1 kit 0     blood glucose monitoring (NO BRAND SPECIFIED) test strip Use to test blood sugars 2 times daily or as directed 100 strip 3     blood glucose monitoring (NO BRAND SPECIFIED) test strip Use to test blood sugars 2-4 times daily or as directed 100 strip 11     blood glucose monitoring (ONE TOUCH DELICA) lancets Use to test blood sugars 2 times daily or as  directed. 100 each 3     clotrimazole-betamethasone (LOTRISONE) cream Apply topically 2 times daily 15 g 3     GINSENG PO Take by mouth daily       losartan (COZAAR) 25 MG tablet Take 1 tablet (25 mg) by mouth daily 90 tablet 3     metFORMIN (GLUCOPHAGE-XR) 500 MG 24 hr tablet Take 1 tablet (500 mg) by mouth daily (with dinner) 90 tablet 3     metoprolol succinate (TOPROL XL) 25 MG 24 hr tablet Take 1 tablet (25 mg) by mouth daily 90 tablet 2     Multiple Vitamin (MULTIVITAMINS PO) Take 1 tablet by mouth daily.       nicotine (NICORETTE) 2 MG gum Place 1 each (2 mg) inside cheek as needed for smoking cessation *CHEW APPROX. 20 PCS PER DAY AS DIRECTED 880 each 11     olopatadine (PATANOL) 0.1 % ophthalmic solution Place 1 drop into both eyes 2 times daily 1 Bottle 11     Omega-3 Fatty Acids (FISH OIL PO) Take 1 g by mouth daily        psyllium (METAMUCIL) 58.6 % POWD Take 2 teaspoonful by mouth daily       sildenafil (VIAGRA) 100 MG tablet Take 0.5-1 tablets ( mg) by mouth daily as needed 8 tablet 12     Allergies   Allergen Reactions     Seasonal Allergies      Recent Labs   Lab Test 06/14/19  0905 12/10/18  1137 08/08/18  1619  06/08/18  0920  04/07/17  0908   A1C 6.5* 6.4* 7.1*  --  10.2*   < > 6.1*   *  --   --   --  95  --  122*   HDL 50  --   --   --  51  --  51   TRIG 123  --   --   --  146  --  134   ALT 52 35 59   < > 126*   < > 43   CR 0.79 0.81 0.84   < > 0.76   < > 0.87   GFRESTIMATED >90 >90 >90   < > >90   < > 88   GFRESTBLACK >90 >90 >90   < > >90   < > >90  African American GFR Calc     POTASSIUM 4.4 4.3 3.8   < > 4.2   < > 4.3   TSH  --   --   --   --  3.04  --  3.64    < > = values in this interval not displayed.      BP Readings from Last 3 Encounters:   07/27/19 130/82   07/26/19 120/74   06/12/19 125/81    Wt Readings from Last 3 Encounters:   07/27/19 94.3 kg (208 lb)   07/26/19 94.1 kg (207 lb 6.4 oz)   06/12/19 93.8 kg (206 lb 12.8 oz)                    Reviewed and updated as  needed this visit by Provider         Review of Systems   ROS COMP: Constitutional, HEENT, cardiovascular, pulmonary, gi and gu systems are negative, except as otherwise noted.      Objective    /82   Pulse 95   Temp 98.4  F (36.9  C) (Oral)   Wt 94.3 kg (208 lb)   SpO2 98%   BMI 29.01 kg/m    Body mass index is 29.01 kg/m .  Physical Exam   GENERAL: healthy, alert and no distress  EYES: Eyes grossly normal to inspection, PERRL and conjunctivae and sclerae normal  NECK: no adenopathy, no asymmetry, masses, or scars and thyroid normal to palpation  MS: no gross musculoskeletal defects noted, no edema  SKIN: no suspicious lesions or rashes  PSYCH: mentation appears normal, affect normal/bright      LEFT eye is stained with fluorescein with 5 mm patch of corneal abrasion at the 1 oclock position over outer edge of iris    Assessment & Plan     1. Abrasion of left cornea, initial encounter    - trimethoprim-polymyxin b (POLYTRIM) 60914-4.1 UNIT/ML-% ophthalmic solution; Place 1-2 drops Into the left eye every 6 hours  Dispense: 10 mL; Refill: 0     Advised to treat with Polytrim qid x 7 days.  If no change or worsening symptoms, advise Follow-up with OPHTHALMOLOGY for recheck prn.    Nia Rodriguez MD   Urgent Care Provider  [facility] URGENT CARE

## 2019-08-28 ENCOUNTER — OFFICE VISIT (OUTPATIENT)
Dept: OPHTHALMOLOGY | Facility: CLINIC | Age: 64
End: 2019-08-28
Attending: OPHTHALMOLOGY
Payer: COMMERCIAL

## 2019-08-28 ENCOUNTER — TELEPHONE (OUTPATIENT)
Dept: OPHTHALMOLOGY | Facility: CLINIC | Age: 64
End: 2019-08-28

## 2019-08-28 DIAGNOSIS — H10.89 PAPILLARY CONJUNCTIVITIS: Primary | ICD-10-CM

## 2019-08-28 DIAGNOSIS — H10.13 ALLERGIC CONJUNCTIVITIS, BILATERAL: ICD-10-CM

## 2019-08-28 PROCEDURE — G0463 HOSPITAL OUTPT CLINIC VISIT: HCPCS | Mod: ZF

## 2019-08-28 RX ORDER — OLOPATADINE HYDROCHLORIDE 2 MG/ML
1 SOLUTION/ DROPS OPHTHALMIC DAILY
Qty: 1 BOTTLE | Refills: 11 | Status: SHIPPED | OUTPATIENT
Start: 2019-08-28 | End: 2019-08-29

## 2019-08-28 RX ORDER — FLUOROMETHOLONE 0.1 %
1 SUSPENSION, DROPS(FINAL DOSAGE FORM)(ML) OPHTHALMIC (EYE) 2 TIMES DAILY
Qty: 1 BOTTLE | Refills: 0 | Status: SHIPPED | OUTPATIENT
Start: 2019-08-28 | End: 2019-08-29

## 2019-08-28 ASSESSMENT — CUP TO DISC RATIO
OS_RATIO: 0.2
OD_RATIO: 0.2

## 2019-08-28 ASSESSMENT — CONF VISUAL FIELD
OS_NORMAL: 1
OD_NORMAL: 1
METHOD: COUNTING FINGERS

## 2019-08-28 ASSESSMENT — TONOMETRY
OS_IOP_MMHG: 16
IOP_METHOD: TONOPEN
OD_IOP_MMHG: 15

## 2019-08-28 ASSESSMENT — VISUAL ACUITY
OS_SC: 20/15
OS_SC+: -1
OD_SC: 20/20
METHOD: SNELLEN - LINEAR

## 2019-08-28 ASSESSMENT — SLIT LAMP EXAM - LIDS
COMMENTS: NORMAL
COMMENTS: NORMAL

## 2019-08-28 ASSESSMENT — EXTERNAL EXAM - RIGHT EYE: OD_EXAM: NORMAL

## 2019-08-28 ASSESSMENT — EXTERNAL EXAM - LEFT EYE: OS_EXAM: NORMAL

## 2019-08-28 NOTE — TELEPHONE ENCOUNTER
Pt called back at 1205  Reviewed FML drop sent to [facility]  Pt states still wants pataday sent to [facility] target -- updated [facility] to disregard RX for pataday  Updated and resent Rx for pataday to [facility] target for Once daily   Rico Kwan RN RN 12:18 PM 08/29/19    ---    Called regular pharmacy and updated them pataday only daily and updated Rx sent  Rico Kwan RN RN 12:16 PM 08/29/19    ----    Pt left message for FML and pataday eye drop to be sent to [facility] [address] [facility]    Rx's sent-- left message at 1155 and provided direct number for any further assistance  Rico Kwan RN RN 11:55 AM 08/29/19    --      Left message with direct number at 0815   Reviewed [facility] not able to dispense FML eye drop, but able to send to another non[facility] pharmacy for dispensing  Rico Kwan RN RN 8:41 AM 08/29/19    ---      Pharmacy called and stated that [facility] won't allow them to get this medication.  Is there a way you can change it to something else? Please call them to discuss this. Thanks!

## 2019-08-28 NOTE — TELEPHONE ENCOUNTER
[facility] Call Center    Phone Message    May a detailed message be left on voicemail: no    Reason for Call: Medication Question or concern regarding medication   Prescription Clarification  Name of Medication: fluorometholone (FML LIQUIFILM) 0.1 % ophthalmic suspension  Prescribing Provider: Dr. Cabrera   Pharmacy: [facility] [id number] [address]   What on the order needs clarification?    **Pharmacy wants to verify that this should be used twice a day. Stated that it is normally only done once a day. Please call back at: [phone]**        Action Taken: Message routed to:  [facility] ([facility] Eye

## 2019-08-28 NOTE — PROGRESS NOTES
NAYELY  Sujata Valencia is a 63 year old male here for evaluation of foreign body sensation left eye since he was trimming bushes about 2 months ago. He went to urgent care and was prescribed polytrim, but it hasn't helped much.  No eye pain, redness, discharge. No flashes/floaters. He has bilateral eye itching associated with seasonal allergies in spring and fall. He uses olopatadine BID for this during allergy season. This hasn't been working as well for him recently.     Assessment & Plan    (H10.89) Papillary conjunctivitis  (primary encounter diagnosis)  Comment: Left upper lid, may have had prior FB, but no visible FB on lid eversion.  Plan: FML BID left eye for 2 week course    (H10.13) Allergic conjunctivitis, bilateral  Comment: Seasonal  Plan: Can try switch to Pataday. ATs in addition BID.    (R73.03) Borderline type 2 diabetes mellitus  (primary encounter diagnosis)  Comment: He has been followed for this since 2012.On metformin. Last A1c around 6 per patient. No diabetic retinopathy at last DFE 6/2019  Plan: Discussed the importance of tight blood glucose control in the prevention of diabetic retinopathy. Recommend yearly dilated eye exam.    (H52.4) Presbyopia OU  Comment: Good distance vision without correction  Plan: Ok to continue with OTC readers    -----------------------------------------------------------------------------------    Patient disposition:   Return in about 1 year (around 8/28/2020). or sooner as needed.    Teaching statement:  Complete documentation of historical and exam elements from today's encounter can be found in the full encounter summary report (not reduplicated in this progress note). I personally obtained the chief complaint(s) and history of present illness.  I confirmed and edited as necessary the review of systems, past medical/surgical history, family history, social history, and examination findings as documented by others; and I examined the patient myself. I  personally reviewed the relevant tests, images, and reports as documented above.     I formulated and edited as necessary the assessment and plan and discussed the findings and management plan with the patient and family.    Maria Teresa Cabrera MD  Comprehensive Ophthalmology & Ocular Pathology  Department of Ophthalmology and Visual Neurosciences  [email or web address]  Pager [phone]

## 2019-08-29 DIAGNOSIS — L98.9 SKIN LESION: ICD-10-CM

## 2019-08-29 RX ORDER — FLUOROMETHOLONE 0.1 %
1 SUSPENSION, DROPS(FINAL DOSAGE FORM)(ML) OPHTHALMIC (EYE) 2 TIMES DAILY
Qty: 1 BOTTLE | Refills: 0 | Status: SHIPPED | OUTPATIENT
Start: 2019-08-29

## 2019-08-29 RX ORDER — OLOPATADINE HYDROCHLORIDE 2 MG/ML
1 SOLUTION/ DROPS OPHTHALMIC DAILY
Qty: 1 BOTTLE | Refills: 11 | Status: SHIPPED | OUTPATIENT
Start: 2019-08-29 | End: 2020-09-21

## 2019-08-29 RX ORDER — CLOTRIMAZOLE AND BETAMETHASONE DIPROPIONATE 10; .64 MG/G; MG/G
CREAM TOPICAL 2 TIMES DAILY
Qty: 15 G | Refills: 3 | Status: SHIPPED | OUTPATIENT
Start: 2019-08-29

## 2019-08-29 RX ORDER — OLOPATADINE HYDROCHLORIDE 2 MG/ML
1 SOLUTION/ DROPS OPHTHALMIC DAILY
Qty: 1 BOTTLE | Refills: 11 | Status: SHIPPED | OUTPATIENT
Start: 2019-08-29 | End: 2019-08-29

## 2019-08-29 NOTE — TELEPHONE ENCOUNTER
[facility] Call Center    Phone Message    May a detailed message be left on voicemail: yes    Reason for Call: Medication Refill Request    Has the patient contacted the pharmacy for the refill? Yes   Name of medication being requested: clotrimazole-betamethasone (LOTRISONE) cream     Provider who prescribed the medication: Wes Harris MD  Pharmacy: [facility] [address] [address]  Date medication is needed: asap   Please call patient about this medication he's been trying to get a refill for about a week now please call asap thank you       Action Taken: Message routed to:  [facility] ([facility] fco

## 2019-08-29 NOTE — TELEPHONE ENCOUNTER
Last Clinic Visit: 7/26/2019  [facility]    Patient was called and message left on voice mail that the order has been sent.      Kathleen M Doege RN

## 2019-09-12 ENCOUNTER — OFFICE VISIT (OUTPATIENT)
Dept: ORTHOPEDICS | Facility: CLINIC | Age: 64
End: 2019-09-12
Payer: COMMERCIAL

## 2019-09-12 ENCOUNTER — ANCILLARY PROCEDURE (OUTPATIENT)
Dept: GENERAL RADIOLOGY | Facility: CLINIC | Age: 64
End: 2019-09-12
Attending: FAMILY MEDICINE
Payer: COMMERCIAL

## 2019-09-12 VITALS — BODY MASS INDEX: 29.12 KG/M2 | HEIGHT: 71 IN | WEIGHT: 208 LBS

## 2019-09-12 DIAGNOSIS — M25.562 LEFT KNEE PAIN, UNSPECIFIED CHRONICITY: Primary | ICD-10-CM

## 2019-09-12 DIAGNOSIS — M17.12 PRIMARY OSTEOARTHRITIS OF LEFT KNEE: ICD-10-CM

## 2019-09-12 DIAGNOSIS — M25.562 LEFT KNEE PAIN, UNSPECIFIED CHRONICITY: ICD-10-CM

## 2019-09-12 RX ORDER — LIDOCAINE HYDROCHLORIDE 10 MG/ML
5 INJECTION, SOLUTION EPIDURAL; INFILTRATION; INTRACAUDAL; PERINEURAL
Status: SHIPPED | OUTPATIENT
Start: 2019-09-12

## 2019-09-12 RX ORDER — TRIAMCINOLONE ACETONIDE 40 MG/ML
40 INJECTION, SUSPENSION INTRA-ARTICULAR; INTRAMUSCULAR
Status: SHIPPED | OUTPATIENT
Start: 2019-09-12

## 2019-09-12 RX ADMIN — TRIAMCINOLONE ACETONIDE 40 MG: 40 INJECTION, SUSPENSION INTRA-ARTICULAR; INTRAMUSCULAR at 12:29

## 2019-09-12 RX ADMIN — LIDOCAINE HYDROCHLORIDE 5 ML: 10 INJECTION, SOLUTION EPIDURAL; INFILTRATION; INTRACAUDAL; PERINEURAL at 12:29

## 2019-09-12 ASSESSMENT — PAIN SCALES - GENERAL: PAINLEVEL: SEVERE PAIN (7)

## 2019-09-12 ASSESSMENT — MIFFLIN-ST. JEOR: SCORE: 1750.61

## 2019-09-12 NOTE — LETTER
2019      RE: Sujata Valencia  [address] [place] [address]       2019: Sujata Valencia is a 63 year old male who is seen in f/u up for left knee pain      PMH:  Past Medical History:   Diagnosis Date     Arthritis      Back pains, lower      Chest pain 2014     Hypertension      Myocarditis (H)      Other abnormal glucose 2011     Other and unspecified hyperlipidemia 2011     Subclinical hypothyroidism        Active problem list:  Patient Active Problem List   Diagnosis     Chronic bilateral low back pain with right-sided sciatica     Lumbar stenosis with neurogenic claudication       FH:  Family History   Problem Relation Age of Onset     Osteoporosis Mother      Diabetes Maternal Grandmother      Glaucoma No family hx of      Macular Degeneration No family hx of        SH:  Social History     Socioeconomic History     Marital status:      Spouse name: Not on file     Number of children: 1     Years of education: Not on file     Highest education level: Not on file   Occupational History     Employer: UNKNOWN     Comment: SEMI-RETIRED, OWNS A CAMPING [employer]   Social Needs     Financial resource strain: Not on file     Food insecurity:     Worry: Not on file     Inability: Not on file     Transportation needs:     Medical: Not on file     Non-medical: Not on file   Tobacco Use     Smoking status: Former Smoker     Packs/day: 1.00     Years: 25.00     Pack years: 25.00     Start date:      Last attempt to quit: 10/31/2001     Years since quittin.8     Smokeless tobacco: Never Used   Substance and Sexual Activity     Alcohol use: Yes     Comment: scotch 2 times a week, wine 5 days per week at drs request     Drug use: No     Sexual activity: Not on file   Lifestyle     Physical activity:     Days per week: Not on file     Minutes per session: Not on file     Stress: Not on file   Relationships     Social connections:     Talks on phone: Not on file      Gets together: Not on file     Attends Mosque service: Not on file     Active member of club or organization: Not on file     Attends meetings of clubs or organizations: Not on file     Relationship status: Not on file     Intimate partner violence:     Fear of current or ex partner: Not on file     Emotionally abused: Not on file     Physically abused: Not on file     Forced sexual activity: Not on file   Other Topics Concern     Parent/sibling w/ CABG, MI or angioplasty before 65F 55M? Not Asked   Social History Narrative     Not on file       MEDS:  See EMR, reviewed  ALL:  See EMR, reviewed    REVIEW OF SYSTEMS:  CONSTITUTIONAL:NEGATIVE for fever, chills, change in weight  INTEGUMENTARY/SKIN: NEGATIVE for worrisome rashes, moles or lesions  EYES: NEGATIVE for vision changes or irritation  ENT/MOUTH: NEGATIVE for ear, mouth and throat problems  RESP:NEGATIVE for significant cough or SOB  BREAST: NEGATIVE for masses, tenderness or discharge  CV: NEGATIVE for chest pain, palpitations or peripheral edema  GI: NEGATIVE for nausea, abdominal pain, heartburn, or change in bowel habits  :NEGATIVE for frequency, dysuria, or hematuria  :NEGATIVE for frequency, dysuria, or hematuria  NEURO: NEGATIVE for weakness, dizziness or paresthesias  ENDOCRINE: NEGATIVE for temperature intolerance, skin/hair changes  HEME/ALLERGY/IMMUNE: NEGATIVE for bleeding problems  PSYCHIATRIC: NEGATIVE for changes in mood or affect    Subjective: 63-year-old male over the last 6 weeks had a tendency to notice discomfort directly at his medial left knee joint.  Occasionally notes that at the right medial joint but is more likely to be the left.  He has a past history of lumbar stenosis but today he is not describing any radicular discomfort in the leg.  It bothers him with weightbearing directly at the joint line it bothers him when he flexes and extend the knee.  No swelling or catching or instability symptoms.  No recent injury.  He  had x-rays of his bilateral knees a year ago that showed a mild amount of medial joint space narrowing and some subtle signs of patellofemoral DJD.  The knees are uncomfortable when he lies in bed and they touch each other.  He puts a pillow between his legs.    Objective there is no effusion at either knee.  He is mildly tender over the medial joint lines bilaterally.  Nontender over the lateral joint lines.  Nontender over the medial or lateral patella facet of either knee.  I can flex and extend both knees fully.  Nontender over the pes anserine bursa of either knee.  Nontender over the distal IT band of either knee.  Nontender over the patellar tendon.  He has good range of motion of the bilateral hips.  Straight leg raise is negative peripheral pulses strong and symmetrical.  Overlying skin is normal.  Appropriate conversation affect.    Repeat standing x-rays of the knees are done today and continue to show mild amount of DJD that does not seem significantly changed compared to a year ago.    Assessment bilateral knee DJD.    : We went over his x-rays today.  We discussed conservative care for knee DJD.  We discussed Tylenol and its side effects, nonsteroidal anti-inflammatory medicines and their side effects.  He does have a history of diabetes although it tends to be diet controlled.  He is not on insulin.  I discussed the Tylenol is the safest medicine.  He denies a history of liver disease.  We discussed an  brace.  The patient would like to avoid that brace for now.  We discussed cortisone injections.  He indicates that family members have had this in the past and tolerated and he would like to try it for his left knee today.  He does not want for the right knee as he does not feel that the right knee is symptomatic enough to warrant it.  He would like to see physical therapy for both knees which is reasonable as his amount of wear is mild.  So after informed consent about bleeding, infection,  steroid flare and after prep with surgical scrub he was injected in his left knee from a lateral approach in the seated position with 1 cc of Kenalog 40 and 5 cc of 1% lidocaine.  Medicine went easily left the clinic ambulatory look for improved with the injection and physical therapy and follow-up as needed.    This note was created with the use of Dragon software and unintentional spelling or errors may have occurred.    Large Joint Injection/Arthocentesis: L knee joint  Date/Time: 9/12/2019 12:29 PM  Performed by: Adan Daigle MD  Authorized by: Adan Daigle MD     Indications:  Osteoarthritis  Needle Size:  25 G  Guidance: landmark guided    Approach:  Lateral  Location:  Knee      Medications:  40 mg triamcinolone 40 MG/ML; 5 mL lidocaine (PF) 1 %  Outcome:  Tolerated well, no immediate complications  Procedure discussed: discussed risks, benefits, and alternatives    Consent Given by:  Patient  Timeout: timeout called immediately prior to procedure    Prep: patient was prepped and draped in usual sterile fashion     Scribed by Liz Fields MS, ATC for Dr. Daigle on 9/12/2019 at 12:30 PM, based on the provider s statements to me.        Adan Daigle MD

## 2019-09-12 NOTE — PROGRESS NOTES
2019: Sujata Valencia is a 63 year old male who is seen in f/u up for left knee pain      PMH:  Past Medical History:   Diagnosis Date     Arthritis      Back pains, lower      Chest pain 2014     Hypertension      Myocarditis (H)      Other abnormal glucose 2011     Other and unspecified hyperlipidemia 2011     Subclinical hypothyroidism        Active problem list:  Patient Active Problem List   Diagnosis     Chronic bilateral low back pain with right-sided sciatica     Lumbar stenosis with neurogenic claudication       FH:  Family History   Problem Relation Age of Onset     Osteoporosis Mother      Diabetes Maternal Grandmother      Glaucoma No family hx of      Macular Degeneration No family hx of        SH:  Social History     Socioeconomic History     Marital status:      Spouse name: Not on file     Number of children: 1     Years of education: Not on file     Highest education level: Not on file   Occupational History     Employer: UNKNOWN     Comment: SEMI-RETIRED, OWNS A CAMPING [employer]   Social Needs     Financial resource strain: Not on file     Food insecurity:     Worry: Not on file     Inability: Not on file     Transportation needs:     Medical: Not on file     Non-medical: Not on file   Tobacco Use     Smoking status: Former Smoker     Packs/day: 1.00     Years: 25.00     Pack years: 25.00     Start date:      Last attempt to quit: 10/31/2001     Years since quittin.8     Smokeless tobacco: Never Used   Substance and Sexual Activity     Alcohol use: Yes     Comment: scotch 2 times a week, wine 5 days per week at drs request     Drug use: No     Sexual activity: Not on file   Lifestyle     Physical activity:     Days per week: Not on file     Minutes per session: Not on file     Stress: Not on file   Relationships     Social connections:     Talks on phone: Not on file     Gets together: Not on file     Attends Zoroastrianism service: Not on file     Active  member of club or organization: Not on file     Attends meetings of clubs or organizations: Not on file     Relationship status: Not on file     Intimate partner violence:     Fear of current or ex partner: Not on file     Emotionally abused: Not on file     Physically abused: Not on file     Forced sexual activity: Not on file   Other Topics Concern     Parent/sibling w/ CABG, MI or angioplasty before 65F 55M? Not Asked   Social History Narrative     Not on file       MEDS:  See EMR, reviewed  ALL:  See EMR, reviewed    REVIEW OF SYSTEMS:  CONSTITUTIONAL:NEGATIVE for fever, chills, change in weight  INTEGUMENTARY/SKIN: NEGATIVE for worrisome rashes, moles or lesions  EYES: NEGATIVE for vision changes or irritation  ENT/MOUTH: NEGATIVE for ear, mouth and throat problems  RESP:NEGATIVE for significant cough or SOB  BREAST: NEGATIVE for masses, tenderness or discharge  CV: NEGATIVE for chest pain, palpitations or peripheral edema  GI: NEGATIVE for nausea, abdominal pain, heartburn, or change in bowel habits  :NEGATIVE for frequency, dysuria, or hematuria  :NEGATIVE for frequency, dysuria, or hematuria  NEURO: NEGATIVE for weakness, dizziness or paresthesias  ENDOCRINE: NEGATIVE for temperature intolerance, skin/hair changes  HEME/ALLERGY/IMMUNE: NEGATIVE for bleeding problems  PSYCHIATRIC: NEGATIVE for changes in mood or affect    Subjective: 63-year-old male over the last 6 weeks had a tendency to notice discomfort directly at his medial left knee joint.  Occasionally notes that at the right medial joint but is more likely to be the left.  He has a past history of lumbar stenosis but today he is not describing any radicular discomfort in the leg.  It bothers him with weightbearing directly at the joint line it bothers him when he flexes and extend the knee.  No swelling or catching or instability symptoms.  No recent injury.  He had x-rays of his bilateral knees a year ago that showed a mild amount of medial  joint space narrowing and some subtle signs of patellofemoral DJD.  The knees are uncomfortable when he lies in bed and they touch each other.  He puts a pillow between his legs.    Objective there is no effusion at either knee.  He is mildly tender over the medial joint lines bilaterally.  Nontender over the lateral joint lines.  Nontender over the medial or lateral patella facet of either knee.  I can flex and extend both knees fully.  Nontender over the pes anserine bursa of either knee.  Nontender over the distal IT band of either knee.  Nontender over the patellar tendon.  He has good range of motion of the bilateral hips.  Straight leg raise is negative peripheral pulses strong and symmetrical.  Overlying skin is normal.  Appropriate conversation affect.    Repeat standing x-rays of the knees are done today and continue to show mild amount of DJD that does not seem significantly changed compared to a year ago.    Assessment bilateral knee DJD.    : We went over his x-rays today.  We discussed conservative care for knee DJD.  We discussed Tylenol and its side effects, nonsteroidal anti-inflammatory medicines and their side effects.  He does have a history of diabetes although it tends to be diet controlled.  He is not on insulin.  I discussed the Tylenol is the safest medicine.  He denies a history of liver disease.  We discussed an  brace.  The patient would like to avoid that brace for now.  We discussed cortisone injections.  He indicates that family members have had this in the past and tolerated and he would like to try it for his left knee today.  He does not want for the right knee as he does not feel that the right knee is symptomatic enough to warrant it.  He would like to see physical therapy for both knees which is reasonable as his amount of wear is mild.  So after informed consent about bleeding, infection, steroid flare and after prep with surgical scrub he was injected in his left knee  from a lateral approach in the seated position with 1 cc of Kenalog 40 and 5 cc of 1% lidocaine.  Medicine went easily left the clinic ambulatory look for improved with the injection and physical therapy and follow-up as needed.    This note was created with the use of Dragon software and unintentional spelling or errors may have occurred.    Large Joint Injection/Arthocentesis: L knee joint  Date/Time: 9/12/2019 12:29 PM  Performed by: Adan Daigle MD  Authorized by: Adan Daigle MD     Indications:  Osteoarthritis  Needle Size:  25 G  Guidance: landmark guided    Approach:  Lateral  Location:  Knee      Medications:  40 mg triamcinolone 40 MG/ML; 5 mL lidocaine (PF) 1 %  Outcome:  Tolerated well, no immediate complications  Procedure discussed: discussed risks, benefits, and alternatives    Consent Given by:  Patient  Timeout: timeout called immediately prior to procedure    Prep: patient was prepped and draped in usual sterile fashion     Scribed by Liz Fields MS, ATC for Dr. Daigle on 9/12/2019 at 12:30 PM, based on the provider s statements to me.

## 2019-09-12 NOTE — NURSING NOTE
[address] [address]  Dept: [phone]  ______________________________________________________________________________    Patient: Sujata Valencia   : 1955   MRN: [id number]   2019    INVASIVE PROCEDURE SAFETY CHECKLIST    Date: 2019   Procedure: Left knee kenalog injection  Patient Name: Sujata Valencia  MRN: [id number]  YOB: 1955    Action: Complete sections as appropriate. Any discrepancy results in a HARD COPY until resolved.     PRE PROCEDURE:  Patient ID verified with 2 identifiers (name and  or MRN): Yes  Procedure and site verified with patient/designee (when able): Yes  Accurate consent documentation in medical record: Yes  H&P (or appropriate assessment) documented in medical record: Yes  H&P must be up to 20 days prior to procedure and updates within 24 hours of procedure as applicable: NA  Relevant diagnostic and radiology test results appropriately labeled and displayed as applicable: Yes  Procedure site(s) marked with provider initials: NA    TIMEOUT:  Time-Out performed immediately prior to starting procedure, including verbal and active participation of all team members addressing the following:Yes  * Correct patient identify  * Confirmed that the correct side and site are marked  * An accurate procedure consent form  * Agreement on the procedure to be done  * Correct patient position  * Relevant images and results are properly labeled and appropriately displayed  * The need to administer antibiotics or fluids for irrigation purposes during the procedure as applicable   * Safety precautions based on patient history or medication use    DURING PROCEDURE: Verification of correct person, site, and procedures any time the responsibility for care of the patient is transferred to another member of the care team.       Prior to injection, verified patient identity using patient's name and  date of birth.  Due to injection administration, patient instructed to remain in clinic for 15 minutes  afterwards, and to report any adverse reaction to me immediately.    Joint injection was performed.      Drug Amount Wasted:  None.  Vial/Syringe: Single dose vial  Expiration Date:  N/A      Liz Fields, ATC  September 12, 2019

## 2019-09-16 ENCOUNTER — THERAPY VISIT (OUTPATIENT)
Dept: PHYSICAL THERAPY | Facility: CLINIC | Age: 64
End: 2019-09-16
Attending: FAMILY MEDICINE
Payer: COMMERCIAL

## 2019-09-16 DIAGNOSIS — M25.562 ACUTE PAIN OF LEFT KNEE: ICD-10-CM

## 2019-09-16 PROCEDURE — 97110 THERAPEUTIC EXERCISES: CPT | Mod: GP | Performed by: PHYSICAL THERAPIST

## 2019-09-16 PROCEDURE — 97161 PT EVAL LOW COMPLEX 20 MIN: CPT | Mod: GP | Performed by: PHYSICAL THERAPIST

## 2019-09-16 ASSESSMENT — ACTIVITIES OF DAILY LIVING (ADL)
RISE FROM A CHAIR: ACTIVITY IS MINIMALLY DIFFICULT
WEAKNESS: I HAVE THE SYMPTOM BUT IT DOES NOT AFFECT MY ACTIVITY
STAND: ACTIVITY IS MINIMALLY DIFFICULT
HOW_WOULD_YOU_RATE_THE_CURRENT_FUNCTION_OF_YOUR_KNEE_DURING_YOUR_USUAL_DAILY_ACTIVITIES_ON_A_SCALE_FROM_0_TO_100_WITH_100_BEING_YOUR_LEVEL_OF_KNEE_FUNCTION_PRIOR_TO_YOUR_INJURY_AND_0_BEING_THE_INABILITY_TO_PERFORM_ANY_OF_YOUR_USUAL_DAILY_ACTIVITIES?: 70
RAW_SCORE: 43
GO DOWN STAIRS: ACTIVITY IS FAIRLY DIFFICULT
SIT WITH YOUR KNEE BENT: ACTIVITY IS MINIMALLY DIFFICULT
WALK: ACTIVITY IS SOMEWHAT DIFFICULT
PAIN: THE SYMPTOM AFFECTS MY ACTIVITY MODERATELY
GO UP STAIRS: ACTIVITY IS FAIRLY DIFFICULT
STIFFNESS: I HAVE THE SYMPTOM BUT IT DOES NOT AFFECT MY ACTIVITY
SQUAT: ACTIVITY IS VERY DIFFICULT
KNEE_ACTIVITY_OF_DAILY_LIVING_SUM: 43
GIVING WAY, BUCKLING OR SHIFTING OF KNEE: THE SYMPTOM AFFECTS MY ACTIVITY MODERATELY
SWELLING: I HAVE THE SYMPTOM BUT IT DOES NOT AFFECT MY ACTIVITY
KNEEL ON THE FRONT OF YOUR KNEE: ACTIVITY IS MINIMALLY DIFFICULT
AS_A_RESULT_OF_YOUR_KNEE_INJURY,_HOW_WOULD_YOU_RATE_YOUR_CURRENT_LEVEL_OF_DAILY_ACTIVITY?: ABNORMAL
LIMPING: THE SYMPTOM AFFECTS MY ACTIVITY SLIGHTLY
KNEE_ACTIVITY_OF_DAILY_LIVING_SCORE: 61.43
HOW_WOULD_YOU_RATE_THE_OVERALL_FUNCTION_OF_YOUR_KNEE_DURING_YOUR_USUAL_DAILY_ACTIVITIES?: ABNORMAL

## 2019-09-16 NOTE — PROGRESS NOTES
"      [facility] Initial Evaluation  Subjective:  Sujata Valencia is a 63 year old male.    Patient s chief complaints: L>R medial knee pain.    Condition occurred due to no specific cause.  Date of Onset: about 7/16/19  Location of symptoms is medial joint line B knees L>R .  Symptoms other than pain include: intermittent giving way or limping  Quality of pain is aching and frequency is intermittent.    Pain dependence on time of day is: worse at night if he lays on his sides and his knees are resting one on the other.   Pain rating is: 6/10.    Symptoms are exacerbated by: laying on side knees together, prolonged walking, stairs.    Symptoms are relieved by:  Injection with some relief.    Progression of symptoms is that symptoms are:  Some improvement post injection.  Imaging/Special tests include: x-ray B.  Medial compartment mild joint space narrowing B.   Previous treatments include: injection.   Patient reports that general health is: good.   Pertinent medical history includes:  diabetes.    Medical allergies includes: seasonal allergies.   Surgical history includes: fistuala.  Current medications include: none reported- list available in epic  Current occupation is: retired  Work status is:retired  Primary job tasks include:  lifting, carrying, driving  Barriers include: none  Red flags include: none     Patient's expectations for therapy include: walking for distance up to 5 miles     HPI                     Objective:   KNEE:     PROM:    L  R   Hyperextension 1 2   Extension 0 0   Flexion 133 with end range pain 138 trace pain      Strength:    L R   HIP       Flex 5 5   Ext 4 4   Abd 5 5   KNEE       Flex 5 5   Ext 5 5   General \"Flash of pain\" after flex/ext, not during, just general after on L.       Special tests:    L R   Anterior Drawer  negative   negative   Posterior Drawer  negative    negative   Lachman's   negative   negative   Valgus 0 degrees   negative   negative "   Valgus 30 degrees   negative   negative   Varus 0 degrees   negative   negative   Varus 30 degrees   negative   negative   Joe's   negative  negative    Appley's Trace positive medial knee pain with tibial ER negative   Lateral Compression       Patellar Compression             Palpation: tender to palpation B knees medial joint line L>R     Patellar tracking: left side mild lateral tilt, but good medial to lateral position, R side no notable glide/tilt observed.  Positioned fairly neutral and pain presents more joint line not as superior as patella.      Functional: able to do supported knee bend to 60 degrees without pain     Gait: intermittent antalgic gait, mild limp with pain L medial knee joint line     System     Physical Exam     General      ROS     Assessment/Plan:    Patient is a 63 year old male with both sides L>R knee complaints.    Patient has the following significant findings with corresponding treatment plan.                Diagnosis 1:  L>R knee joint pain (medial)  Pain -  hot/cold therapy, manual therapy and directional preference exercise  Decreased ROM/flexibility - manual therapy and therapeutic exercise  Decreased strength - therapeutic exercise and therapeutic activities  Decreased proprioception - neuro re-education and therapeutic activities  Impaired gait - gait training  Decreased function - therapeutic activities     Therapy Evaluation Codes:   1. History comprised of:              Personal factors that impact the plan of care:                            None.               Comorbidity factors that impact the plan of care are:                            None.                Medications impacting care: None.  2. Examination of Body Systems comprised of:              Body structures and functions that impact the plan of care:                            Knee.              Activity limitations that impact the plan of care are:                            Stairs, Walking, Sleeping and  Laying down.  3. Clinical presentation characteristics are:              Stable/Uncomplicated.  4. Decision-Making                          Low complexity using standardized patient assessment instrument and/or measureable assessment of functional outcome.  Cumulative Therapy Evaluation is: Low complexity.     Previous and current functional limitations:  (See Goal Flow Sheet for this information)    Short term and Long term goals: (See Goal Flow Sheet for this information)      Communication ability:  Patient appears to be able to clearly communicate and understand verbal and written communication and follow directions correctly.  Treatment Explanation - The following has been discussed with the patient:   RX ordered/plan of care  Anticipated outcomes  Possible risks and side effects  This patient would benefit from PT intervention to resume normal activities.   Rehab potential is good.     Frequency:  1 X week, once daily  Duration:  for 6 weeks  Discharge Plan:  Achieve all LTG.  Independent in home treatment program.  Reach maximal therapeutic benefit.     Please refer to the daily flowsheet for treatment today, total treatment time and time spent performing 1:1 timed codes.

## 2019-09-23 ENCOUNTER — THERAPY VISIT (OUTPATIENT)
Dept: PHYSICAL THERAPY | Facility: CLINIC | Age: 64
End: 2019-09-23
Payer: COMMERCIAL

## 2019-09-23 DIAGNOSIS — M25.562 ACUTE PAIN OF LEFT KNEE: ICD-10-CM

## 2019-09-23 PROCEDURE — 97110 THERAPEUTIC EXERCISES: CPT | Mod: GP | Performed by: PHYSICAL THERAPIST

## 2019-09-23 PROCEDURE — 97112 NEUROMUSCULAR REEDUCATION: CPT | Mod: GP | Performed by: PHYSICAL THERAPIST

## 2019-09-23 PROCEDURE — 97140 MANUAL THERAPY 1/> REGIONS: CPT | Mod: GP | Performed by: PHYSICAL THERAPIST

## 2019-09-30 ENCOUNTER — THERAPY VISIT (OUTPATIENT)
Dept: PHYSICAL THERAPY | Facility: CLINIC | Age: 64
End: 2019-09-30
Payer: COMMERCIAL

## 2019-09-30 DIAGNOSIS — M25.562 ACUTE PAIN OF LEFT KNEE: ICD-10-CM

## 2019-09-30 PROCEDURE — 97140 MANUAL THERAPY 1/> REGIONS: CPT | Mod: GP | Performed by: PHYSICAL THERAPIST

## 2019-09-30 PROCEDURE — 97110 THERAPEUTIC EXERCISES: CPT | Mod: GP | Performed by: PHYSICAL THERAPIST

## 2019-10-04 ENCOUNTER — TELEPHONE (OUTPATIENT)
Dept: INTERNAL MEDICINE | Facility: CLINIC | Age: 64
End: 2019-10-04

## 2019-10-04 ENCOUNTER — HEALTH MAINTENANCE LETTER (OUTPATIENT)
Age: 64
End: 2019-10-04

## 2019-10-04 NOTE — TELEPHONE ENCOUNTER
Patient requesting   phnuema vax 23 higher dose  tetanus  Flu shot   Pt was asking if he could get these at his upcoming apt. Joya Curry Paramedic on 10/4/2019 at 1:45 PM

## 2019-10-04 NOTE — TELEPHONE ENCOUNTER
[facility] Call Center    Phone Message    May a detailed message be left on voicemail: yes    Reason for Call: Other: Pt requesting call back from his care team, Pt has some questions regarding a flu shot and some other vaccines     Action Taken: Message routed to:  [facility] ([facility] fco

## 2019-10-08 ENCOUNTER — THERAPY VISIT (OUTPATIENT)
Dept: PHYSICAL THERAPY | Facility: CLINIC | Age: 64
End: 2019-10-08
Payer: COMMERCIAL

## 2019-10-08 DIAGNOSIS — M25.562 ACUTE PAIN OF LEFT KNEE: ICD-10-CM

## 2019-10-08 PROCEDURE — 97110 THERAPEUTIC EXERCISES: CPT | Mod: GP | Performed by: PHYSICAL THERAPIST

## 2019-10-08 PROCEDURE — 97140 MANUAL THERAPY 1/> REGIONS: CPT | Mod: GP | Performed by: PHYSICAL THERAPIST

## 2019-10-14 ENCOUNTER — THERAPY VISIT (OUTPATIENT)
Dept: PHYSICAL THERAPY | Facility: CLINIC | Age: 64
End: 2019-10-14
Payer: COMMERCIAL

## 2019-10-14 DIAGNOSIS — M25.562 ACUTE PAIN OF LEFT KNEE: ICD-10-CM

## 2019-10-14 PROCEDURE — 97140 MANUAL THERAPY 1/> REGIONS: CPT | Mod: GP | Performed by: PHYSICAL THERAPIST

## 2019-10-14 PROCEDURE — 97110 THERAPEUTIC EXERCISES: CPT | Mod: GP | Performed by: PHYSICAL THERAPIST

## 2019-10-14 PROCEDURE — 97112 NEUROMUSCULAR REEDUCATION: CPT | Mod: GP | Performed by: PHYSICAL THERAPIST

## 2019-10-22 ENCOUNTER — THERAPY VISIT (OUTPATIENT)
Dept: PHYSICAL THERAPY | Facility: CLINIC | Age: 64
End: 2019-10-22
Payer: COMMERCIAL

## 2019-10-22 DIAGNOSIS — M25.562 ACUTE PAIN OF LEFT KNEE: ICD-10-CM

## 2019-10-22 PROCEDURE — 97140 MANUAL THERAPY 1/> REGIONS: CPT | Mod: GP | Performed by: PHYSICAL THERAPIST

## 2019-10-22 PROCEDURE — 97110 THERAPEUTIC EXERCISES: CPT | Mod: GP | Performed by: PHYSICAL THERAPIST

## 2019-10-22 PROCEDURE — 97112 NEUROMUSCULAR REEDUCATION: CPT | Mod: GP | Performed by: PHYSICAL THERAPIST

## 2019-10-22 ASSESSMENT — ACTIVITIES OF DAILY LIVING (ADL)
STIFFNESS: THE SYMPTOM AFFECTS MY ACTIVITY SLIGHTLY
STAND: ACTIVITY IS NOT DIFFICULT
KNEE_ACTIVITY_OF_DAILY_LIVING_SUM: 49
RAW_SCORE: 49
SWELLING: THE SYMPTOM AFFECTS MY ACTIVITY SLIGHTLY
KNEE_ACTIVITY_OF_DAILY_LIVING_SCORE: 70
HOW_WOULD_YOU_RATE_THE_OVERALL_FUNCTION_OF_YOUR_KNEE_DURING_YOUR_USUAL_DAILY_ACTIVITIES?: NEARLY NORMAL
KNEEL ON THE FRONT OF YOUR KNEE: ACTIVITY IS MINIMALLY DIFFICULT
SIT WITH YOUR KNEE BENT: ACTIVITY IS MINIMALLY DIFFICULT
GO UP STAIRS: ACTIVITY IS SOMEWHAT DIFFICULT
AS_A_RESULT_OF_YOUR_KNEE_INJURY,_HOW_WOULD_YOU_RATE_YOUR_CURRENT_LEVEL_OF_DAILY_ACTIVITY?: NEARLY NORMAL
WEAKNESS: I HAVE THE SYMPTOM BUT IT DOES NOT AFFECT MY ACTIVITY
LIMPING: THE SYMPTOM AFFECTS MY ACTIVITY SLIGHTLY
SQUAT: ACTIVITY IS VERY DIFFICULT
PAIN: THE SYMPTOM AFFECTS MY ACTIVITY SLIGHTLY
HOW_WOULD_YOU_RATE_THE_CURRENT_FUNCTION_OF_YOUR_KNEE_DURING_YOUR_USUAL_DAILY_ACTIVITIES_ON_A_SCALE_FROM_0_TO_100_WITH_100_BEING_YOUR_LEVEL_OF_KNEE_FUNCTION_PRIOR_TO_YOUR_INJURY_AND_0_BEING_THE_INABILITY_TO_PERFORM_ANY_OF_YOUR_USUAL_DAILY_ACTIVITIES?: 50
WALK: ACTIVITY IS MINIMALLY DIFFICULT
RISE FROM A CHAIR: ACTIVITY IS MINIMALLY DIFFICULT
GO DOWN STAIRS: ACTIVITY IS SOMEWHAT DIFFICULT
GIVING WAY, BUCKLING OR SHIFTING OF KNEE: I DO NOT HAVE THE SYMPTOM

## 2019-10-22 NOTE — LETTER
[facility]THLETIC [facility]YS[facility]AP[facility]  [address]  [facility] [address]  [phone]    2019    Re: Sujata Valencia   :   1955  MRN:  [id number]   REFERRING PHYSICIAN:   Adan Daigle    [facility] ATHLETIC [facility] PHYSICAL [facility]    Date of Initial Evaluation:  19  Visits:  Rxs Used: 5  Reason for Referral:  Acute pain of left knee    PROGRESS  REPORT    Progress reporting period is from 19 to 10/22/19.       SUBJECTIVE  Subjective changes noted by patient:  Notes that his knees are feeling better.  L knee is feeling better.  R knee is still giving him some intermittent trouble.  Feels he may still need an injection (cortisone vs viscosupplement he discussed) in the R knee--he feels.  Has more pain at night, layign on side putting L knee on R.  Feels about 50% better overall.     Current Pain level: (0 up to 5-6/10).     Initial Pain level: 6/10.   Changes in function:  Yes (See Goal flowsheet attached for changes in current functional level)  Adverse reaction to treatment or activity: None    OBJECTIVE  Changes noted in objective findings:  Yes,   Objective:   Passive knee extension equal at 0 degrees now.    Glute max strength improved to 4+/5 B.    Still some pain with squatting to about 60 degree depth DL/supported--pain in the R knee.  Stairs with intermittent pain.    Understands rehab exercises well.      ASSESSMENT/PLAN  Updated problem list and treatment plan: Diagnosis 1:  L>R medial knee joint pain (mild DJD on x-ray post injection L knee)  Pain -  home program  Decreased ROM/flexibility - home program  Decreased strength - home program  Decreased proprioception - home program  Decreased function - home program  STG/LTGs have been met or progress has been made towards goals:  Yes (See Goal flow sheet completed today.)  Assessment of Progress: The patient's condition is improving.  Self Management Plans:  Patient  has been instructed in a home treatment program.  I have re-evaluated this patient and find that the nature, scope, duration and intensity of the therapy is appropriate for the medical condition of the patient.  Sujata continues to require the following intervention to meet STG and LTG's:  PT intervention is no longer required to meet STG/LTG.    Recommendations:  Patient with 50% improvement overall subjectively, notes that L knee is now doing better than R.  Injection helped in delayed fashion after about 5 weeks.  He is doing well with his HEP now; however, he is still concerned about his R knee and wishes to return to his MD to discuss consideration for injection in the R knee.  He questions if viscosupplementation may provide more relief than cortisone injection.  We discussed some considerations, but noted that he should continue his HEP either way and discuss it further with his orthopedic specialist. He plans to do so.       Thank you for your referral.    INQUIRIES  Therapist: Jean Florence, PT   [facility]  [address] [place] [address] [id number]  [facility] [address]  Phone: [phone]  Fax: [phone]

## 2019-10-22 NOTE — PROGRESS NOTES
Subjective:  HPI                    Objective:  System    Physical Exam    General     ROS    Assessment/Plan:    PROGRESS  REPORT    Progress reporting period is from 9/16/19 to 10/22/19.       SUBJECTIVE  Subjective changes noted by patient:  Notes that his knees are feeling better.  L knee is feeling better.  R knee is still giving him some intermittent trouble.  Feels he may still need an injection (cortisone vs viscosupplement he discussed) in the R knee--he feels.  Has more pain at night, layign on side putting L knee on R.  Feels about 50% better overall.     Current Pain level: (0 up to 5-6/10).     Initial Pain level: 6/10.   Changes in function:  Yes (See Goal flowsheet attached for changes in current functional level)  Adverse reaction to treatment or activity: None    OBJECTIVE  Changes noted in objective findings:  Yes,   Objective:   Passive knee extension equal at 0 degrees now.    Glute max strength improved to 4+/5 B.    Still some pain with squatting to about 60 degree depth DL/supported--pain in the R knee.  Stairs with intermittent pain.    Understands rehab exercises well.      ASSESSMENT/PLAN  Updated problem list and treatment plan: Diagnosis 1:  L>R medial knee joint pain (mild DJD on x-ray post injection L knee)  Pain -  home program  Decreased ROM/flexibility - home program  Decreased strength - home program  Decreased proprioception - home program  Decreased function - home program  STG/LTGs have been met or progress has been made towards goals:  Yes (See Goal flow sheet completed today.)  Assessment of Progress: The patient's condition is improving.  Self Management Plans:  Patient has been instructed in a home treatment program.  I have re-evaluated this patient and find that the nature, scope, duration and intensity of the therapy is appropriate for the medical condition of the patient.  Sujata continues to require the following intervention to meet STG and LTG's:  PT intervention  is no longer required to meet STG/LTG.    Recommendations:  Patient with 50% improvement overall subjectively, notes that L knee is now doing better than R.  Injection helped in delayed fashion after about 5 weeks.  He is doing well with his HEP now; however, he is still concerned about his R knee and wishes to return to his MD to discuss consideration for injection in the R knee.  He questions if viscosupplementation may provide more relief than cortisone injection.  We discussed some considerations, but noted that he should continue his HEP either way and discuss it further with his orthopedic specialist. He plans to do so.     Please refer to the daily flowsheet for treatment today, total treatment time and time spent performing 1:1 timed codes.

## 2019-10-23 DIAGNOSIS — E11.9 DIABETES MELLITUS WITHOUT COMPLICATION (H): ICD-10-CM

## 2019-10-24 DIAGNOSIS — R73.09 INCREASED GLUCOSE LEVEL: ICD-10-CM

## 2019-10-25 ENCOUNTER — OFFICE VISIT (OUTPATIENT)
Dept: INTERNAL MEDICINE | Facility: CLINIC | Age: 64
End: 2019-10-25
Payer: COMMERCIAL

## 2019-10-25 VITALS
WEIGHT: 200 LBS | OXYGEN SATURATION: 96 % | DIASTOLIC BLOOD PRESSURE: 84 MMHG | SYSTOLIC BLOOD PRESSURE: 124 MMHG | HEART RATE: 85 BPM | BODY MASS INDEX: 27.89 KG/M2

## 2019-10-25 DIAGNOSIS — R73.09 INCREASED GLUCOSE LEVEL: ICD-10-CM

## 2019-10-25 DIAGNOSIS — I10 BENIGN ESSENTIAL HYPERTENSION: ICD-10-CM

## 2019-10-25 DIAGNOSIS — E11.65 TYPE 2 DIABETES MELLITUS WITH HYPERGLYCEMIA, WITHOUT LONG-TERM CURRENT USE OF INSULIN (H): ICD-10-CM

## 2019-10-25 DIAGNOSIS — Z23 NEED FOR VACCINATION: ICD-10-CM

## 2019-10-25 DIAGNOSIS — Z23 NEED FOR VACCINATION: Primary | ICD-10-CM

## 2019-10-25 PROBLEM — M25.562 ACUTE PAIN OF LEFT KNEE: Status: RESOLVED | Noted: 2019-09-16 | Resolved: 2019-10-25

## 2019-10-25 LAB
ALBUMIN SERPL-MCNC: 4.3 G/DL (ref 3.4–5)
ALP SERPL-CCNC: 88 U/L (ref 40–150)
ALT SERPL W P-5'-P-CCNC: 43 U/L (ref 0–70)
ANION GAP SERPL CALCULATED.3IONS-SCNC: 6 MMOL/L (ref 3–14)
AST SERPL W P-5'-P-CCNC: 17 U/L (ref 0–45)
BILIRUB SERPL-MCNC: 0.7 MG/DL (ref 0.2–1.3)
BUN SERPL-MCNC: 12 MG/DL (ref 7–30)
CALCIUM SERPL-MCNC: 9.6 MG/DL (ref 8.5–10.1)
CHLORIDE SERPL-SCNC: 100 MMOL/L (ref 94–109)
CO2 SERPL-SCNC: 30 MMOL/L (ref 20–32)
CREAT SERPL-MCNC: 0.88 MG/DL (ref 0.66–1.25)
GFR SERPL CREATININE-BSD FRML MDRD: 90 ML/MIN/{1.73_M2}
GLUCOSE SERPL-MCNC: 122 MG/DL (ref 70–99)
HBA1C MFR BLD: 6.4 % (ref 0–5.6)
POTASSIUM SERPL-SCNC: 4.7 MMOL/L (ref 3.4–5.3)
PROT SERPL-MCNC: 8.1 G/DL (ref 6.8–8.8)
SODIUM SERPL-SCNC: 137 MMOL/L (ref 133–144)

## 2019-10-25 RX ORDER — METFORMIN HCL 500 MG
500 TABLET, EXTENDED RELEASE 24 HR ORAL 2 TIMES DAILY WITH MEALS
Qty: 180 TABLET | Refills: 3 | Status: SHIPPED | OUTPATIENT
Start: 2019-10-25 | End: 2020-09-29

## 2019-10-25 NOTE — PROGRESS NOTES
HPI:    Pt. Comes in for follow up today. Overall doing well. He has stopped drinking EtOH but states he was in [place] and [place] recently and did have some alcohol.  He is exercising more. He is checking his Blood sugars and BP. While in [place] his Metformin was increased to 500 mg twice a day.  He has some mild urinary sxs. O/w no additional HEENT, cardiopulmonary, abdominal, , neurological complaints.    PE:    Vitals noted gen nad cooperative alert, HEENT oropharynx clear neck supple nl rom, LCTA, B, good air movement, RRR, S1, S2,. No MRG, no acute findings abdominal exam. No tenderness to palpation of lower back    EKG (7/26/2019): SR at 92; no significant change from 8/20/2014    A/P:    1. PSA checked at 0.31 on 6/14/2019. Referred to  Urology 9/5/2018  for possible BPH and he wants to wait on any surgery for now.    2. H/o myocarditis; see by Dr. Rich, Cardiology 6/28/2018; he had resting echo 6/27/2018: EF 45-50% with mild global hypokinesis  3. EtOH use/abuse; he states he has stopped drinking or at least cut down. I advised him today to restrain as much as possible  4. Fatty liver; abdominal U/S 7/27/18 stable and seen in GI 7/2017; liver tests normal 8/8/2018  5. DM2;  Seen by Dr. Ceja, Endo 8/22/2018 and he remains on Metformin.  A1C was quite good at 6.5% on 6/14/2019. Ordered an A1c today 10/25/2019.   6. Colonoscopy 5/2017 repeat in 3 years  7. Seen in Dermatology 5/2017  8. Pt was seen 11/26/2018 by Dr. Pilo salcedo and had MRI scan 11/20/2018. He was seen again by Dr. Daigle 9/12/2019 and L knee X-ray. She is getting PT. Overall he is doing better  9.  He is due for Td 10/2019 and Pneumococcal 23. Influenza vaccination today.   10. Lipids on 6/14/2019:  and HDL 50; He does NOT want to start on cholesterol medication at this time      Total time spent 25 minutes.  More than 50% of the time spent with Mr. Valencia on counseling / coordinating his care

## 2019-10-25 NOTE — NURSING NOTE
Chief Complaint   Patient presents with     Recheck Medication     diabetes follow up. Pt also interested in flu, pneumo, and TDAP vacc         Sujata Valencia      1.  Has the patient received the information for the influenza vaccine? YES    2.  Does the patient have any of the following contraindications?     Allergy to eggs? No     Allergic reaction to previous influenza vaccines? No     Any other problems to previous influenza vaccines? No     Paralyzed by Guillain-Easton syndrome? No     Currently pregnant? NO     Current moderate or severe illness? No     Allergy to contact lens solution? No    3.  The vaccine has been administered in the usual fashion and the patient was instructed to wait 20 minutes before leaving the building in the event of an allergic reaction: YES    Recorded by Kimberly H. Nissen

## 2019-10-26 LAB — DEPRECATED CALCIDIOL+CALCIFEROL SERPL-MC: 34 UG/L (ref 20–75)

## 2019-11-12 ENCOUNTER — TELEPHONE (OUTPATIENT)
Dept: ORTHOPEDICS | Facility: CLINIC | Age: 64
End: 2019-11-12

## 2019-11-12 NOTE — TELEPHONE ENCOUNTER
Called and LVM asking if he has checked with his insurance about getting Visco injections. I stated he should call his insurance to see if Synvisc one or Gel one is covered by insurance. Did state that if he doesn't get that he would be responsible and each syringe is roughly $2,000. Gave our number to call back if he needed clarification, or had further questions.

## 2019-11-13 ENCOUNTER — OFFICE VISIT (OUTPATIENT)
Dept: ORTHOPEDICS | Facility: CLINIC | Age: 64
End: 2019-11-13
Payer: COMMERCIAL

## 2019-11-13 VITALS — RESPIRATION RATE: 16 BRPM | HEIGHT: 71 IN | WEIGHT: 205 LBS | BODY MASS INDEX: 28.7 KG/M2

## 2019-11-13 DIAGNOSIS — M17.12 PRIMARY OSTEOARTHRITIS OF LEFT KNEE: Primary | ICD-10-CM

## 2019-11-13 ASSESSMENT — MIFFLIN-ST. JEOR: SCORE: 1737

## 2019-11-13 NOTE — LETTER
11/13/2019      RE: Sujata Valencia  [address] [place] [address]       November 13, 2019: Sujata Valencia is a 63 year old male who is seen in f/u up for left knee pain.     3 views left knee(s) radiographs 9/12/2019 2:24 PM     History: Left knee pain, unspecified chronicity     Comparison: November 20, 2018     Findings:     Standing AP view of bilateral knees, lateral and patellofemoral views  of the left knee were obtained.      Left: No acute osseous abnormality.  No joint effusion.     Osteophytes with mild medial compartment joint space loss. Small  ossicle adjacent to the medial tibial spine, may be from prior trauma  versus intra-articular body.     No patellar tilt or lateral subluxation.  Soft tissue is unremarkable.     Right:  No acute osseous abnormality.     Osteophytes with mild medial compartment joint space loss.                                                                      Impression:  1. No acute osseous abnormality.  2. Mild medial compartment joint space loss bilaterally.     ROD HARMON        PMH:  Past Medical History:   Diagnosis Date     Arthritis      Back pains, lower      Chest pain 7/28/2014     Hypertension      Myocarditis (H)      Other abnormal glucose 7/21/2011     Other and unspecified hyperlipidemia 7/21/2011     Subclinical hypothyroidism        Active problem list:  Patient Active Problem List   Diagnosis     Chronic bilateral low back pain with right-sided sciatica     Lumbar stenosis with neurogenic claudication       FH:  Family History   Problem Relation Age of Onset     Osteoporosis Mother      Diabetes Maternal Grandmother      Glaucoma No family hx of      Macular Degeneration No family hx of        SH:  Social History     Socioeconomic History     Marital status:      Spouse name: Not on file     Number of children: 1     Years of education: Not on file     Highest education level: Not on file   Occupational History     Employer:  UNKNOWN     Comment: SEMI-RETIRED, OWNS A CAMPING [employer]   Social Needs     Financial resource strain: Not on file     Food insecurity:     Worry: Not on file     Inability: Not on file     Transportation needs:     Medical: Not on file     Non-medical: Not on file   Tobacco Use     Smoking status: Former Smoker     Packs/day: 1.00     Years: 25.00     Pack years: 25.00     Start date:      Last attempt to quit: 10/31/2001     Years since quittin.0     Smokeless tobacco: Never Used   Substance and Sexual Activity     Alcohol use: Yes     Comment: scotch 2 times a week, wine 5 days per week at drs request     Drug use: No     Sexual activity: Not on file   Lifestyle     Physical activity:     Days per week: Not on file     Minutes per session: Not on file     Stress: Not on file   Relationships     Social connections:     Talks on phone: Not on file     Gets together: Not on file     Attends Jehovah's witness service: Not on file     Active member of club or organization: Not on file     Attends meetings of clubs or organizations: Not on file     Relationship status: Not on file     Intimate partner violence:     Fear of current or ex partner: Not on file     Emotionally abused: Not on file     Physically abused: Not on file     Forced sexual activity: Not on file   Other Topics Concern     Parent/sibling w/ CABG, MI or angioplasty before 65F 55M? Not Asked   Social History Narrative     Not on file       MEDS:  See EMR, reviewed  ALL:  See EMR, reviewed    REVIEW OF SYSTEMS:  CONSTITUTIONAL:NEGATIVE for fever, chills, change in weight  INTEGUMENTARY/SKIN: NEGATIVE for worrisome rashes, moles or lesions  EYES: NEGATIVE for vision changes or irritation  ENT/MOUTH: NEGATIVE for ear, mouth and throat problems  RESP:NEGATIVE for significant cough or SOB  BREAST: NEGATIVE for masses, tenderness or discharge  CV: NEGATIVE for chest pain, palpitations or peripheral edema  GI: NEGATIVE for nausea, abdominal pain,  heartburn, or change in bowel habits  :NEGATIVE for frequency, dysuria, or hematuria  :NEGATIVE for frequency, dysuria, or hematuria  NEURO: NEGATIVE for weakness, dizziness or paresthesias  ENDOCRINE: NEGATIVE for temperature intolerance, skin/hair changes  HEME/ALLERGY/IMMUNE: NEGATIVE for bleeding problems  PSYCHIATRIC: NEGATIVE for changes in mood or affect        Subjective: Patient has persistent weightbearing medial joint line left-sided knee discomfort.  He is checked with his insurance company would like to try a GEL 1 injection.  But he points to the joint line as the area of discomfort.  He denies locking catching or repeated swelling.  Recent x-rays show a mild amount of medial joint space narrowing.    Objective there is no effusion.  He can flex and extend the knee fully.  He has some tenderness along the medial joint line.  Nontender in the area of the pes anserine bursa.  Nontender over the patellar tendon or distal IT band.  Overlying skin is normal.  Appropriate conversation affect.    Assessment left knee DJD    Plan: After informed consent about bleeding, infection, allergic reaction and after prepping with surgical scrub he was injected in the left knee from a lateral approach in the seated position with gel 1.  He tolerated this without difficulty.  Moved his knee through full range of motion, left ambulatory and will look for improvements over the next 1 to 2 weeks.  He knows if the injection is helpful it can be repeated every 6 months but I encouraged him to check with his insurance each time to make sure that it is covered.      Large Joint Injection/Arthocentesis: L knee joint  Date/Time: 11/13/2019 5:23 PM  Performed by: Adan Daigle MD  Authorized by: Adan Daigle MD     Indications:  Osteoarthritis  Needle Size:  22 G  Guidance: landmark guided    Approach:  Anterolateral  Location:  Knee      Medications:  30 mg cross-Linked Hyaluronate 30 MG/3ML  Procedure  discussed: discussed risks, benefits, and alternatives    Consent Given by:  Patient  Timeout: timeout called immediately prior to procedure    Prep: patient was prepped and draped in usual sterile fashion     Scribed by Carlos Eduardo De ATC, ATC for  on 11/13/19 at 5:15, based on providers statements to me.            Adan Daigle MD

## 2019-11-13 NOTE — NURSING NOTE
[address] [address]  Dept: [phone]  ______________________________________________________________________________    Patient: Sujata Valencia   : 1955   MRN: [id number]   2019    INVASIVE PROCEDURE SAFETY CHECKLIST    Date: 19   Procedure:Left knee Gel-One injection   Patient Name: Sujata Valencia  MRN: [id number]  YOB: 1955    Action: Complete sections as appropriate. Any discrepancy results in a HARD COPY until resolved.     PRE PROCEDURE:  Patient ID verified with 2 identifiers (name and  or MRN): Yes  Procedure and site verified with patient/designee (when able): Yes  Accurate consent documentation in medical record: Yes  H&P (or appropriate assessment) documented in medical record: Yes  H&P must be up to 20 days prior to procedure and updates within 24 hours of procedure as applicable: NA  Relevant diagnostic and radiology test results appropriately labeled and displayed as applicable: NA  Procedure site(s) marked with provider initials: NA    TIMEOUT:  Time-Out performed immediately prior to starting procedure, including verbal and active participation of all team members addressing the following:Yes  * Correct patient identify  * Confirmed that the correct side and site are marked  * An accurate procedure consent form  * Agreement on the procedure to be done  * Correct patient position  * Relevant images and results are properly labeled and appropriately displayed  * The need to administer antibiotics or fluids for irrigation purposes during the procedure as applicable   * Safety precautions based on patient history or medication use    DURING PROCEDURE: Verification of correct person, site, and procedures any time the responsibility for care of the patient is transferred to another member of the care team.       Prior to injection, verified patient identity using patient's name and date  of birth.  Due to injection administration, patient instructed to remain in clinic for 15 minutes  afterwards, and to report any adverse reaction to me immediately.    Joint injection was performed.      Drug Amount Wasted:  None.  Vial/Syringe: Single dose vial  Expiration Date:  4/20      Carlos Eduardo De ATC  November 13, 2019

## 2019-11-13 NOTE — PROGRESS NOTES
November 13, 2019: Sujata Valencia is a 63 year old male who is seen in f/u up for left knee pain.     3 views left knee(s) radiographs 9/12/2019 2:24 PM     History: Left knee pain, unspecified chronicity     Comparison: November 20, 2018     Findings:     Standing AP view of bilateral knees, lateral and patellofemoral views  of the left knee were obtained.      Left: No acute osseous abnormality.  No joint effusion.     Osteophytes with mild medial compartment joint space loss. Small  ossicle adjacent to the medial tibial spine, may be from prior trauma  versus intra-articular body.     No patellar tilt or lateral subluxation.  Soft tissue is unremarkable.     Right:  No acute osseous abnormality.     Osteophytes with mild medial compartment joint space loss.                                                                      Impression:  1. No acute osseous abnormality.  2. Mild medial compartment joint space loss bilaterally.     ROD HARMON        PMH:  Past Medical History:   Diagnosis Date     Arthritis      Back pains, lower      Chest pain 7/28/2014     Hypertension      Myocarditis (H)      Other abnormal glucose 7/21/2011     Other and unspecified hyperlipidemia 7/21/2011     Subclinical hypothyroidism        Active problem list:  Patient Active Problem List   Diagnosis     Chronic bilateral low back pain with right-sided sciatica     Lumbar stenosis with neurogenic claudication       FH:  Family History   Problem Relation Age of Onset     Osteoporosis Mother      Diabetes Maternal Grandmother      Glaucoma No family hx of      Macular Degeneration No family hx of        SH:  Social History     Socioeconomic History     Marital status:      Spouse name: Not on file     Number of children: 1     Years of education: Not on file     Highest education level: Not on file   Occupational History     Employer: UNKNOWN     Comment: SEMI-RETIRED, OWNS A CAMPING [employer]   Social Needs     Financial  resource strain: Not on file     Food insecurity:     Worry: Not on file     Inability: Not on file     Transportation needs:     Medical: Not on file     Non-medical: Not on file   Tobacco Use     Smoking status: Former Smoker     Packs/day: 1.00     Years: 25.00     Pack years: 25.00     Start date:      Last attempt to quit: 10/31/2001     Years since quittin.0     Smokeless tobacco: Never Used   Substance and Sexual Activity     Alcohol use: Yes     Comment: scotch 2 times a week, wine 5 days per week at drs request     Drug use: No     Sexual activity: Not on file   Lifestyle     Physical activity:     Days per week: Not on file     Minutes per session: Not on file     Stress: Not on file   Relationships     Social connections:     Talks on phone: Not on file     Gets together: Not on file     Attends Jainism service: Not on file     Active member of club or organization: Not on file     Attends meetings of clubs or organizations: Not on file     Relationship status: Not on file     Intimate partner violence:     Fear of current or ex partner: Not on file     Emotionally abused: Not on file     Physically abused: Not on file     Forced sexual activity: Not on file   Other Topics Concern     Parent/sibling w/ CABG, MI or angioplasty before 65F 55M? Not Asked   Social History Narrative     Not on file       MEDS:  See EMR, reviewed  ALL:  See EMR, reviewed    REVIEW OF SYSTEMS:  CONSTITUTIONAL:NEGATIVE for fever, chills, change in weight  INTEGUMENTARY/SKIN: NEGATIVE for worrisome rashes, moles or lesions  EYES: NEGATIVE for vision changes or irritation  ENT/MOUTH: NEGATIVE for ear, mouth and throat problems  RESP:NEGATIVE for significant cough or SOB  BREAST: NEGATIVE for masses, tenderness or discharge  CV: NEGATIVE for chest pain, palpitations or peripheral edema  GI: NEGATIVE for nausea, abdominal pain, heartburn, or change in bowel habits  :NEGATIVE for frequency, dysuria, or  hematuria  :NEGATIVE for frequency, dysuria, or hematuria  NEURO: NEGATIVE for weakness, dizziness or paresthesias  ENDOCRINE: NEGATIVE for temperature intolerance, skin/hair changes  HEME/ALLERGY/IMMUNE: NEGATIVE for bleeding problems  PSYCHIATRIC: NEGATIVE for changes in mood or affect        Subjective: Patient has persistent weightbearing medial joint line left-sided knee discomfort.  He is checked with his insurance company would like to try a GEL 1 injection.  But he points to the joint line as the area of discomfort.  He denies locking catching or repeated swelling.  Recent x-rays show a mild amount of medial joint space narrowing.    Objective there is no effusion.  He can flex and extend the knee fully.  He has some tenderness along the medial joint line.  Nontender in the area of the pes anserine bursa.  Nontender over the patellar tendon or distal IT band.  Overlying skin is normal.  Appropriate conversation affect.    Assessment left knee DJD    Plan: After informed consent about bleeding, infection, allergic reaction and after prepping with surgical scrub he was injected in the left knee from a lateral approach in the seated position with gel 1.  He tolerated this without difficulty.  Moved his knee through full range of motion, left ambulatory and will look for improvements over the next 1 to 2 weeks.  He knows if the injection is helpful it can be repeated every 6 months but I encouraged him to check with his insurance each time to make sure that it is covered.      Large Joint Injection/Arthocentesis: L knee joint  Date/Time: 11/13/2019 5:23 PM  Performed by: Adan Daigle MD  Authorized by: Adan Daigle MD     Indications:  Osteoarthritis  Needle Size:  22 G  Guidance: landmark guided    Approach:  Anterolateral  Location:  Knee      Medications:  30 mg cross-Linked Hyaluronate 30 MG/3ML  Procedure discussed: discussed risks, benefits, and alternatives    Consent Given by:   Patient  Timeout: timeout called immediately prior to procedure    Prep: patient was prepped and draped in usual sterile fashion     Scribed by Carlos Eduardo De ATC, ATC for  on 11/13/19 at 5:15, based on providers statements to me.

## 2019-12-13 NOTE — MR AVS SNAPSHOT
After Visit Summary   8/16/2017    Sujata Valencia    MRN: [id number]           Patient Information     Date Of Birth          1955        Visit Information        Provider Department      8/16/2017 10:35 AM Wes Harris MD [facility]        Today's Diagnoses     Increased glucose level    -  1      Care Instructions    Primary Care Center Medication Refill Request Information:  * Please contact your pharmacy regarding ANY request for medication refills.  ** [facility] Prescription Fax = [phone]  * Please allow 3 business days for routine medication refills.  * Please allow 5 business days for controlled substance medication refills.     Primary Care Center Test Result notification information:  *You will be notified with in 7-10 days of your appointment day regarding the results of your test.  If you are on MyChart you will be notified as soon as the provider has reviewed the results and signed off on them.    Primary Care Center [phone]             Follow-ups after your visit        Your next 10 appointments already scheduled     Oct 18, 2017 12:05 PM CDT   (Arrive by 11:50 AM)   Return Visit with Wes Harris MD   [facility] ([facility])    [address] [place] [id number] [phone]              Future tests that were ordered for you today     Open Future Orders        Priority Expected Expires Ordered    Hemoglobin A1c Routine 8/16/2017 8/30/2017 8/16/2017            Who to contact     Please call your clinic at [phone] to:    Ask questions about your health    Make or cancel appointments    Discuss your medicines    Learn about your test results    Speak to your doctor   If you have compliments or concerns about an experience at your clinic, or if you wish to file a complaint, please contact [facility] Patient Relations at [phone] or email us at  Aortoiliac occlusive disease with rest pain    S/P Aorta Bifemoral bypass w/ left iliofemoral thrombectomy 12/9    Plan:  Advanced diet to regular  Saline lock  Continue aspirin, Plavix, Lipitor  Pain control  OOB/Amb  PT/OT  Case management for discharge planning [email or web address]         Additional Information About Your Visit        "[employer]hart Information     "[employer]hart gives you secure access to your electronic health record. If you see a primary care provider, you can also send messages to your care team and make appointments. If you have questions, please call your primary care clinic.  If you do not have a primary care provider, please call [phone] and they will assist you.      [employer] is an electronic gateway that provides easy, online access to your medical records. With [employer], you can request a clinic appointment, read your test results, renew a prescription or communicate with your care team.     To access your existing account, please contact your [facility] or call [phone] for assistance.        Care EveryWhere ID     This is your Care EveryWhere ID. This could be used by other organizations to access your [facility] medical records  [id number]        Your Vitals Were     Pulse Respirations BMI (Body Mass Index)             91 16 28.88 kg/m2          Blood Pressure from Last 3 Encounters:   08/16/17 125/75   07/20/17 142/80   06/07/17 122/79    Weight from Last 3 Encounters:   08/16/17 95.3 kg (210 lb)   07/20/17 94.3 kg (207 lb 12.8 oz)   05/04/17 93 kg (205 lb)               Primary Care Provider Office Phone # Fax #    Wes AV MD Kimberly [phone] [address] [address]        Equal Access to Services     FRENCH MCELROY : Hadii corey ku hadasho Soomaali, waaxda luqadaha, qaybta kaalmada adeegyada, wax anjelica nye . So [facility] [phone].    ATENCIÓN: Si habla español, tiene a roberts disposición servicios gratuitos de asistencia lingüística. Llame al [phone].    We comply with applicable federal civil rights laws and [place] laws. We do not discriminate on the basis of race, color, national origin, age, disability sex, sexual orientation  or gender identity.            Thank you!     Thank you for choosing [facility]  for your care. Our goal is always to provide you with excellent care. Hearing back from our patients is one way we can continue to improve our services. Please take a few minutes to complete the written survey that you may receive in the mail after your visit with us. Thank you!             Your Updated Medication List - Protect others around you: Learn how to safely use, store and throw away your medicines at www.disposemymeds.org.          This list is accurate as of: 8/16/17 12:04 PM.  Always use your most recent med list.                   Brand Name Dispense Instructions for use Diagnosis    aspirin 81 MG tablet      Take 1 tablet by mouth daily.        FISH OIL PO      Take 1 g by mouth daily        GINSENG PO      Take by mouth daily    Stress, Increased glucose level       losartan 25 MG tablet    COZAAR    90 tablet    Take 1 tablet (25 mg) by mouth daily    Benign essential hypertension, Screen for colon cancer, Benign nodular prostatic hyperplasia without lower urinary tract symptoms, Skin lesion       metoprolol 25 MG 24 hr tablet    TOPROL XL    90 tablet    Take 1 tablet (25 mg) by mouth daily    Benign essential hypertension, Screen for colon cancer, Benign nodular prostatic hyperplasia without lower urinary tract symptoms, Skin lesion       MULTIVITAMINS PO      Take 1 tablet by mouth daily.        nicotine polacrilex 2 MG gum    NICORETTE    440 each    Place 1 each (2 mg) inside cheek as needed for smoking cessation *CHEW APPROX. 20 PCS PER DAY AS DIRECTED    Smoking       olopatadine 0.1 % ophthalmic solution    PATANOL    1 Bottle    Place 1 drop into both eyes 2 times daily    Allergic conjunctivitis, bilateral       psyllium 58.6 % Powd    METAMUCIL     Take 2 teaspoonful by mouth daily        sildenafil 100 MG cap/tab    REVATIO/VIAGRA    8 tablet    Take 0.5-1 tablets ( mg) by mouth daily  as needed for erectile dysfunction    ED (erectile dysfunction)

## 2020-02-08 ENCOUNTER — HEALTH MAINTENANCE LETTER (OUTPATIENT)
Age: 65
End: 2020-02-08

## 2020-07-13 DIAGNOSIS — N52.01 ERECTILE DYSFUNCTION DUE TO ARTERIAL INSUFFICIENCY: ICD-10-CM

## 2020-07-13 DIAGNOSIS — Z12.5 SCREENING FOR PROSTATE CANCER: ICD-10-CM

## 2020-07-13 DIAGNOSIS — Z12.11 SCREEN FOR COLON CANCER: ICD-10-CM

## 2020-07-13 DIAGNOSIS — L98.9 SKIN LESION: ICD-10-CM

## 2020-07-13 DIAGNOSIS — I10 BENIGN ESSENTIAL HYPERTENSION: ICD-10-CM

## 2020-07-13 DIAGNOSIS — N40.0 BENIGN NODULAR PROSTATIC HYPERPLASIA WITHOUT LOWER URINARY TRACT SYMPTOMS: ICD-10-CM

## 2020-07-13 DIAGNOSIS — F17.200 SMOKING: ICD-10-CM

## 2020-07-14 ENCOUNTER — MYC REFILL (OUTPATIENT)
Dept: INTERNAL MEDICINE | Facility: CLINIC | Age: 65
End: 2020-07-14

## 2020-07-14 DIAGNOSIS — N40.0 BENIGN NODULAR PROSTATIC HYPERPLASIA WITHOUT LOWER URINARY TRACT SYMPTOMS: ICD-10-CM

## 2020-07-14 DIAGNOSIS — Z12.5 SCREENING FOR PROSTATE CANCER: ICD-10-CM

## 2020-07-14 DIAGNOSIS — L98.9 SKIN LESION: ICD-10-CM

## 2020-07-14 DIAGNOSIS — N52.01 ERECTILE DYSFUNCTION DUE TO ARTERIAL INSUFFICIENCY: ICD-10-CM

## 2020-07-14 DIAGNOSIS — Z12.11 SCREEN FOR COLON CANCER: ICD-10-CM

## 2020-07-14 DIAGNOSIS — I10 BENIGN ESSENTIAL HYPERTENSION: ICD-10-CM

## 2020-07-14 DIAGNOSIS — F17.200 SMOKING: ICD-10-CM

## 2020-07-15 RX ORDER — SILDENAFIL 100 MG/1
50-100 TABLET, FILM COATED ORAL DAILY PRN
Qty: 8 TABLET | Refills: 2 | Status: SHIPPED | OUTPATIENT
Start: 2020-07-15

## 2020-07-16 RX ORDER — SILDENAFIL 100 MG/1
50-100 TABLET, FILM COATED ORAL DAILY PRN
Qty: 8 TABLET | Refills: 12 | OUTPATIENT
Start: 2020-07-16

## 2020-07-16 NOTE — TELEPHONE ENCOUNTER
NICOTINE 2 MG CHEWING GUM   nicotine (NICORETTE) 2 MG gum  880 each  2  7/15/2020   No    Sig - Route: Place 1 each (2 mg) inside cheek as needed for smoking cessation *CHEW APPROX. 20 PCS PER DAY AS DIRECTED For additional refills, please schedule a follow-up appointment with Dr Harris for October 2020 at [phone] - Buccal    Sent to pharmacy as: Nicotine Polacrilex 2 MG Mouth/Throat Gum (NICORETTE)    Class: E-Prescribe    Order: [id number]    E-Prescribing Status: Receipt confirmed by pharmacy (7/15/2020 10:56 AM CDT)        SILDENAFIL 100 MG TABLET     sildenafil (VIAGRA) 100 MG tablet  8 tablet  2  7/15/2020   No    Sig - Route: Take 0.5-1 tablets ( mg) by mouth daily as needed (ED) For additional refills, please schedule a follow-up appointment with Dr Harris for October 2020 at [phone] - Oral    Sent to pharmacy as: Sildenafil Citrate 100 MG Oral Tablet (VIAGRA)    Class: E-Prescribe    Order: [id number]    E-Prescribing Status: Receipt confirmed by pharmacy (7/15/2020 10:56 AM CDT)      Aurora Escalante RN  Central Triage Red Flags/Med Refills

## 2020-09-21 ENCOUNTER — OFFICE VISIT (OUTPATIENT)
Dept: OPHTHALMOLOGY | Facility: CLINIC | Age: 65
End: 2020-09-21
Attending: OPHTHALMOLOGY
Payer: COMMERCIAL

## 2020-09-21 DIAGNOSIS — H10.13 ALLERGIC CONJUNCTIVITIS, BILATERAL: Primary | ICD-10-CM

## 2020-09-21 DIAGNOSIS — H11.001 PTERYGIUM, RIGHT: ICD-10-CM

## 2020-09-21 PROCEDURE — G0463 HOSPITAL OUTPT CLINIC VISIT: HCPCS | Mod: ZF

## 2020-09-21 RX ORDER — FLUOROMETHOLONE 0.1 %
1 SUSPENSION, DROPS(FINAL DOSAGE FORM)(ML) OPHTHALMIC (EYE) 3 TIMES DAILY
Qty: 1 BOTTLE | Refills: 0 | Status: SHIPPED | OUTPATIENT
Start: 2020-09-21

## 2020-09-21 RX ORDER — OLOPATADINE HYDROCHLORIDE 1 MG/ML
1 SOLUTION/ DROPS OPHTHALMIC 2 TIMES DAILY
Qty: 1 BOTTLE | Refills: 11 | Status: SHIPPED | OUTPATIENT
Start: 2020-09-21 | End: 2021-04-28

## 2020-09-21 ASSESSMENT — SLIT LAMP EXAM - LIDS
COMMENTS: NORMAL
COMMENTS: NORMAL

## 2020-09-21 ASSESSMENT — CONF VISUAL FIELD
OS_NORMAL: 1
OD_NORMAL: 1

## 2020-09-21 ASSESSMENT — VISUAL ACUITY
OD_SC: 20/25
OS_SC: 20/20
CORRECTION_TYPE: GLASSES
METHOD: SNELLEN - LINEAR

## 2020-09-21 ASSESSMENT — TONOMETRY
IOP_METHOD: APPLANATION
OS_IOP_MMHG: 27
OD_IOP_MMHG: 18
OD_IOP_MMHG: 22
OS_IOP_MMHG: 26
OD_IOP_MMHG: 23
IOP_METHOD: TONOPEN
OS_IOP_MMHG: 21
IOP_METHOD: TONOPEN

## 2020-09-21 ASSESSMENT — EXTERNAL EXAM - RIGHT EYE: OD_EXAM: NORMAL

## 2020-09-21 ASSESSMENT — CUP TO DISC RATIO
OD_RATIO: 0.2
OS_RATIO: 0.2

## 2020-09-21 ASSESSMENT — EXTERNAL EXAM - LEFT EYE: OS_EXAM: NORMAL

## 2020-09-21 NOTE — PROGRESS NOTES
HPI  Sujata Valencia is a 64 year old male here for evaluation of persistent temporal right eye redness, present since he went to [facility] earlier this year. There is associated irritation. He is using olopatadine 0.2%, but he'd prefer to go back to the 0.1% so that he can use it BID which works better for his allergies.    Assessment & Plan    (H10.13) Allergic conjunctivitis, bilateral  (primary encounter diagnosis)  Comment: Controlled with patanol, but he prefers the BID dosing of 0.1%  Plan: Sent in Rx for patanol 0.1% BID    (H11.001) Pterygium, right  Comment: With mild elevation and few prominent vessels.   Plan: Try FML right eye 3x daily for 2 weeks along with ATs 3x daily     (R73.03) Borderline type 2 diabetes mellitus  (primary encounter diagnosis)  Comment: He has been followed for this since 2012.On metformin. Last A1c 6.4 10/25/2019. No diabetic retinopathy  Plan: Discussed the importance of tight blood glucose control in the prevention of diabetic retinopathy. Recommend yearly dilated eye exam.    (H52.4) Presbyopia OU  Comment: Good distance vision without correction  Plan: Ok to continue with OTC readers    -----------------------------------------------------------------------------------    Patient disposition:   Return in about 1 year (around 9/21/2021). or sooner as needed.    Teaching statement:  Complete documentation of historical and exam elements from today's encounter can be found in the full encounter summary report (not reduplicated in this progress note). I personally obtained the chief complaint(s) and history of present illness.  I confirmed and edited as necessary the review of systems, past medical/surgical history, family history, social history, and examination findings as documented by others; and I examined the patient myself. I personally reviewed the relevant tests, images, and reports as documented above.     I formulated and edited as necessary the assessment and plan  and discussed the findings and management plan with the patient and family.    Maria Teresa Cabrera MD  Comprehensive Ophthalmology & Ocular Pathology  Department of Ophthalmology and Visual Neurosciences  [email or web address]  Pager [phone]

## 2020-09-29 ENCOUNTER — OFFICE VISIT (OUTPATIENT)
Dept: INTERNAL MEDICINE | Facility: CLINIC | Age: 65
End: 2020-09-29
Payer: COMMERCIAL

## 2020-09-29 VITALS
SYSTOLIC BLOOD PRESSURE: 127 MMHG | BODY MASS INDEX: 29.16 KG/M2 | TEMPERATURE: 98 F | DIASTOLIC BLOOD PRESSURE: 82 MMHG | HEIGHT: 71 IN | HEART RATE: 87 BPM | WEIGHT: 208.3 LBS | OXYGEN SATURATION: 98 % | RESPIRATION RATE: 16 BRPM

## 2020-09-29 DIAGNOSIS — I10 BENIGN ESSENTIAL HYPERTENSION: ICD-10-CM

## 2020-09-29 DIAGNOSIS — R73.09 INCREASED GLUCOSE LEVEL: Primary | ICD-10-CM

## 2020-09-29 DIAGNOSIS — E11.65 TYPE 2 DIABETES MELLITUS WITH HYPERGLYCEMIA, WITHOUT LONG-TERM CURRENT USE OF INSULIN (H): ICD-10-CM

## 2020-09-29 DIAGNOSIS — M54.89 OTHER BACK PAIN, UNSPECIFIED CHRONICITY: ICD-10-CM

## 2020-09-29 RX ORDER — METFORMIN HCL 500 MG
TABLET, EXTENDED RELEASE 24 HR ORAL
Qty: 180 TABLET | Refills: 3 | Status: SHIPPED | OUTPATIENT
Start: 2020-09-29 | End: 2021-05-17

## 2020-09-29 ASSESSMENT — PAIN SCALES - GENERAL: PAINLEVEL: MODERATE PAIN (5)

## 2020-09-29 ASSESSMENT — MIFFLIN-ST. JEOR: SCORE: 1746.97

## 2020-09-29 NOTE — TELEPHONE ENCOUNTER
[facility] Call Center    Phone Message    May a detailed message be left on voicemail: yes     Reason for Call: Medication Question or concern regarding medication   Prescription Clarification  Name of Medication: Glucose Monitor  Prescribing Provider: Wes Harris MD    Pharmacy: [facility] [address] [address]    What on the order needs clarification? The patient said the pharmacy is having issues with the order for the Glucose Monitor they need a formal order please call patient or pharmacy to understand more about what needed for order request thank you           Action Taken: Message routed to:  [facility] ([facility]    Travel Screening: Not Applicable

## 2020-09-29 NOTE — PATIENT INSTRUCTIONS
MRI: [phone]      [facility] Medication Refill Request Information:  * Please contact your pharmacy regarding ANY request for medication refills.  ** [facility] Prescription Fax = [phone]  * Please allow 3 business days for routine medication refills.  * Please allow 5 business days for controlled substance medication refills.     [facility] Test Result notification information:  *You will be notified with in 7-10 days of your appointment day regarding the results of your test.  If you are on MyChart you will be notified as soon as the provider has reviewed the results and signed off on them.    [facility]: [phone]

## 2020-09-29 NOTE — PROGRESS NOTES
HPI:    I last saw him in-person 10/25/2019. He comes in for a physical today. He has some chronic lower back pain and now with B posterior leg radiating numbness. He states also worse pain with standing for long periods of time. Certain flexing/extension positions can make the sxs. Less. He states he has almost stopped EtOH consumption. No current smoking. No other HEENT, cardiopulmonary, abdominal , neurological, systemic, psychiatric, lymphatic, endocrine complaints.     Past Medical History:   Diagnosis Date     Arthritis      Back pains, lower      Chest pain 7/28/2014     Hypertension      Myocarditis (H)      Other abnormal glucose 7/21/2011     Other and unspecified hyperlipidemia 7/21/2011     Subclinical hypothyroidism      Past Medical History:   Diagnosis Date     Arthritis      Back pains, lower      Chest pain 7/28/2014     Hypertension      Myocarditis (H)      Other abnormal glucose 7/21/2011     Other and unspecified hyperlipidemia 7/21/2011     Subclinical hypothyroidism      PE:    Vitals noted, gen, nad, cooperative, alert, HEENT, he is wearing a mask, neck supple, nl rom, lungs with good air movement, RRR, S1, S2, no MRG, abdomen, no acute finding, minimal tenderness to low back palpation. He has adequate B LE strength.     A/P:    1. HTN; stable today and ordered labs and he remains on the same medications.   2. Ordered PSA and UA today; screen prostate cancer  3. Colonoscopy 5/3/2017 with several polyps repeat in 3-5 years  4. Back pain and B posterior leg numbness and positional changes with sxs. Last lumbar MRI scan 11/20/2018 with moderate/severe spinal stenosis. Reordered MRI lumbar and if worse refer to Dr. Allen, ortho spine  5. DM2; ordered A1c, urine microalbumin  6. EtOH he states very little current consumption  7. Influenza vaccination today. Other immunizations have been done  8. H/o myocarditis; last resting echo 6/28/2018; I recommended he consider repeat echo but he wants to  wait because of COVID.

## 2020-09-29 NOTE — NURSING NOTE
Chief Complaint   Patient presents with     Physical     Patient comes in for a physical exam.          Winston Madrigal MA on 9/29/2020 at 10:44 AM

## 2020-10-02 ENCOUNTER — ALLIED HEALTH/NURSE VISIT (OUTPATIENT)
Dept: INTERNAL MEDICINE | Facility: CLINIC | Age: 65
End: 2020-10-02
Payer: COMMERCIAL

## 2020-10-02 DIAGNOSIS — R73.09 INCREASED GLUCOSE LEVEL: Primary | ICD-10-CM

## 2020-10-02 DIAGNOSIS — Z23 NEED FOR INFLUENZA VACCINATION: ICD-10-CM

## 2020-10-02 DIAGNOSIS — I10 BENIGN ESSENTIAL HYPERTENSION: ICD-10-CM

## 2020-10-02 DIAGNOSIS — M54.89 OTHER BACK PAIN, UNSPECIFIED CHRONICITY: ICD-10-CM

## 2020-10-02 DIAGNOSIS — R73.09 INCREASED GLUCOSE LEVEL: ICD-10-CM

## 2020-10-02 LAB
ALBUMIN SERPL-MCNC: 4 G/DL (ref 3.4–5)
ALBUMIN UR-MCNC: NEGATIVE MG/DL
ALP SERPL-CCNC: 75 U/L (ref 40–150)
ALT SERPL W P-5'-P-CCNC: 48 U/L (ref 0–70)
ANION GAP SERPL CALCULATED.3IONS-SCNC: 8 MMOL/L (ref 3–14)
APPEARANCE UR: CLEAR
AST SERPL W P-5'-P-CCNC: 19 U/L (ref 0–45)
BASOPHILS # BLD AUTO: 0.1 10E9/L (ref 0–0.2)
BASOPHILS NFR BLD AUTO: 0.7 %
BILIRUB SERPL-MCNC: 0.7 MG/DL (ref 0.2–1.3)
BILIRUB UR QL STRIP: NEGATIVE
BUN SERPL-MCNC: 12 MG/DL (ref 7–30)
CALCIUM SERPL-MCNC: 9 MG/DL (ref 8.5–10.1)
CHLORIDE SERPL-SCNC: 106 MMOL/L (ref 94–109)
CHOLEST SERPL-MCNC: 214 MG/DL
CO2 SERPL-SCNC: 26 MMOL/L (ref 20–32)
COLOR UR AUTO: YELLOW
CREAT SERPL-MCNC: 0.85 MG/DL (ref 0.66–1.25)
CREAT UR-MCNC: 82 MG/DL
DEPRECATED CALCIDIOL+CALCIFEROL SERPL-MC: 30 UG/L (ref 20–75)
DIFFERENTIAL METHOD BLD: NORMAL
EOSINOPHIL # BLD AUTO: 0.3 10E9/L (ref 0–0.7)
EOSINOPHIL NFR BLD AUTO: 4 %
ERYTHROCYTE [DISTWIDTH] IN BLOOD BY AUTOMATED COUNT: 12.1 % (ref 10–15)
GFR SERPL CREATININE-BSD FRML MDRD: >90 ML/MIN/{1.73_M2}
GLUCOSE SERPL-MCNC: 113 MG/DL (ref 70–99)
GLUCOSE UR STRIP-MCNC: NEGATIVE MG/DL
HBA1C MFR BLD: 6.2 % (ref 0–5.6)
HCT VFR BLD AUTO: 48.9 % (ref 40–53)
HDLC SERPL-MCNC: 51 MG/DL
HGB BLD-MCNC: 16.6 G/DL (ref 13.3–17.7)
HGB UR QL STRIP: NEGATIVE
IMM GRANULOCYTES # BLD: 0 10E9/L (ref 0–0.4)
IMM GRANULOCYTES NFR BLD: 0.4 %
KETONES UR STRIP-MCNC: NEGATIVE MG/DL
LDLC SERPL CALC-MCNC: 139 MG/DL
LEUKOCYTE ESTERASE UR QL STRIP: NEGATIVE
LYMPHOCYTES # BLD AUTO: 2.4 10E9/L (ref 0.8–5.3)
LYMPHOCYTES NFR BLD AUTO: 34.8 %
MCH RBC QN AUTO: 30.3 PG (ref 26.5–33)
MCHC RBC AUTO-ENTMCNC: 33.9 G/DL (ref 31.5–36.5)
MCV RBC AUTO: 89 FL (ref 78–100)
MICROALBUMIN UR-MCNC: <5 MG/L
MICROALBUMIN/CREAT UR: NORMAL MG/G CR (ref 0–17)
MONOCYTES # BLD AUTO: 0.6 10E9/L (ref 0–1.3)
MONOCYTES NFR BLD AUTO: 9.4 %
MUCOUS THREADS #/AREA URNS LPF: PRESENT /LPF
NEUTROPHILS # BLD AUTO: 3.4 10E9/L (ref 1.6–8.3)
NEUTROPHILS NFR BLD AUTO: 50.7 %
NITRATE UR QL: NEGATIVE
NONHDLC SERPL-MCNC: 163 MG/DL
NRBC # BLD AUTO: 0 10*3/UL
NRBC BLD AUTO-RTO: 0 /100
PH UR STRIP: 7 PH (ref 5–7)
PLATELET # BLD AUTO: 180 10E9/L (ref 150–450)
POTASSIUM SERPL-SCNC: 4.3 MMOL/L (ref 3.4–5.3)
PROT SERPL-MCNC: 7.8 G/DL (ref 6.8–8.8)
PSA SERPL-ACNC: 0.46 UG/L (ref 0–4)
RBC # BLD AUTO: 5.47 10E12/L (ref 4.4–5.9)
RBC #/AREA URNS AUTO: 0 /HPF (ref 0–2)
SODIUM SERPL-SCNC: 139 MMOL/L (ref 133–144)
SOURCE: ABNORMAL
SP GR UR STRIP: 1.01 (ref 1–1.03)
TRIGL SERPL-MCNC: 119 MG/DL
TSH SERPL DL<=0.005 MIU/L-ACNC: 3.48 MU/L (ref 0.4–4)
URATE SERPL-MCNC: 6.3 MG/DL (ref 3.5–7.2)
UROBILINOGEN UR STRIP-MCNC: 0 MG/DL (ref 0–2)
WBC # BLD AUTO: 6.8 10E9/L (ref 4–11)
WBC #/AREA URNS AUTO: <1 /HPF (ref 0–5)

## 2020-10-02 PROCEDURE — 90662 IIV NO PRSV INCREASED AG IM: CPT

## 2020-10-02 PROCEDURE — 83036 HEMOGLOBIN GLYCOSYLATED A1C: CPT | Mod: 90 | Performed by: PATHOLOGY

## 2020-10-02 PROCEDURE — 80061 LIPID PANEL: CPT | Performed by: PATHOLOGY

## 2020-10-02 PROCEDURE — 84550 ASSAY OF BLOOD/URIC ACID: CPT | Performed by: PATHOLOGY

## 2020-10-02 PROCEDURE — 82043 UR ALBUMIN QUANTITATIVE: CPT | Performed by: PATHOLOGY

## 2020-10-02 PROCEDURE — 82306 VITAMIN D 25 HYDROXY: CPT | Mod: 90 | Performed by: PATHOLOGY

## 2020-10-02 PROCEDURE — 99000 SPECIMEN HANDLING OFFICE-LAB: CPT | Performed by: PATHOLOGY

## 2020-10-02 PROCEDURE — 36415 COLL VENOUS BLD VENIPUNCTURE: CPT | Performed by: PATHOLOGY

## 2020-10-02 PROCEDURE — 99207 PR NO CHARGE NURSE ONLY: CPT

## 2020-10-02 PROCEDURE — G0103 PSA SCREENING: HCPCS | Mod: 90 | Performed by: PATHOLOGY

## 2020-10-02 PROCEDURE — 81001 URINALYSIS AUTO W/SCOPE: CPT | Performed by: PATHOLOGY

## 2020-10-02 PROCEDURE — 80050 GENERAL HEALTH PANEL: CPT | Performed by: PATHOLOGY

## 2020-10-02 PROCEDURE — 90471 IMMUNIZATION ADMIN: CPT

## 2020-10-02 RX ORDER — BLOOD-GLUCOSE,RECEIVER,CONT
EACH MISCELLANEOUS
Qty: 1 EACH | Refills: 0 | Status: SHIPPED | OUTPATIENT
Start: 2020-10-02

## 2020-10-02 NOTE — PROGRESS NOTES
Chief Complaint   Patient presents with     Imm/Inj     Flu vaccine high dose     Medication Request     Dexcom meter device request and reorder glucose meter         Sujata Valencia comes into clinic today at the request of Wes Harris MD for flu vaccine.    Administered influenza fluzone high dose (see Immunizations in Chart Review). Patient tolerated well.        This service provided today was under the direct supervision of Dr. Harris, who was available when patient requested Dexcom and glucometer sent to his [facility].    Chang Hall CMA at 10:06 AM on 10/2/2020

## 2020-10-12 ENCOUNTER — TELEPHONE (OUTPATIENT)
Dept: ENDOCRINOLOGY | Facility: CLINIC | Age: 65
End: 2020-10-12

## 2020-10-12 NOTE — TELEPHONE ENCOUNTER
"Hi Dr Harris,    I saw you entered a referral for this patient to be seen for a follow up in Endocrinology with Dr Ceja, whom he saw back in 2018. I called the patient to discuss scheduling and he feels \"his numbers have been stable for some time now\" and he says he's been denied by his insurance multiple times for a cgm, so he doesn't feel like an appointment with our clinic is necessary at this time.    I wanted to let you know, in case you or your nursing team would like to call him to discuss. Also, I'm cc'ing Dr Ceja on this message so she's aware of things too.    Thank you,  Pearl OLIVARES  Diabetes & Endocrinology Clinic Coordinator  Ph. [phone]  Fx. [phone]    "

## 2020-10-23 NOTE — TELEPHONE ENCOUNTER
DIAGNOSIS: Lumbar spine stenosis , per patient , Wes Harris is recomending , MRI will be in [place]   APPOINTMENT DATE: 10/23/2020   NOTES STATUS DETAILS   OFFICE NOTE from referring provider Internal 09/29/2020   OFFICE NOTE from other specialist INTERNAL 11/13/2019   DISCHARGE SUMMARY from hospital N/A    DISCHARGE REPORT from the ER N/A    OPERATIVE REPORT N/A    MEDICATION LIST Internal    MRI Received 11/20/2018     CT SCAN N/A    XRAYS (IMAGES & REPORTS) Internal 09/12/2019 11/20/2018

## 2020-11-02 ENCOUNTER — OFFICE VISIT (OUTPATIENT)
Dept: ORTHOPEDICS | Facility: CLINIC | Age: 65
End: 2020-11-02
Payer: COMMERCIAL

## 2020-11-02 VITALS — WEIGHT: 208 LBS | HEIGHT: 71 IN | BODY MASS INDEX: 29.12 KG/M2

## 2020-11-02 DIAGNOSIS — M17.0 PRIMARY OSTEOARTHRITIS OF BOTH KNEES: Primary | ICD-10-CM

## 2020-11-02 PROCEDURE — 99213 OFFICE O/P EST LOW 20 MIN: CPT | Performed by: FAMILY MEDICINE

## 2020-11-02 ASSESSMENT — MIFFLIN-ST. JEOR: SCORE: 1745.61

## 2020-11-02 NOTE — LETTER
11/2/2020      RE: Sujata BARKER Erik  [address] [place] [address]       Pt is a 64 year old male last seen by Dr Daigle in 11/2019 here today for:     Bilateral Medial Knee pain :   Duration? 5 years  Injury/ Inciting activity? Sleeping knee on knee  Pop? No  Swelling? No  Limited motion? No  Locking/ Catching? No  Giving way/ instability? No  Imaging? 9/2019 - L knee xray w/ mild medial compartment OA   Treatment? CSI, Gel One injection, and HEP -> unsure if the injections helped    Pain is focally medial  Pain is worse sleeping knee on knee    SH: his son is a sports med physician at [employer]; did training in  at  [employer]     Past Medical History:   Diagnosis Date     Arthritis      Back pains, lower      Chest pain 7/28/2014     Hypertension      Myocarditis (H)      Other abnormal glucose 7/21/2011     Other and unspecified hyperlipidemia 7/21/2011     Subclinical hypothyroidism       Past Surgical History:   Procedure Laterality Date     COLONOSCOPY  1998    fistula      CORONARY ANGIOGRAPHY ADULT ORDER  07/29/2014    normal coronary arteries      Current Outpatient Medications   Medication Sig Dispense Refill     aspirin 81 MG tablet Take 1 tablet by mouth daily.       blood glucose (NO BRAND SPECIFIED) lancets standard Use to test blood sugar 3 times daily or as directed. 90 each 3     blood glucose (NO BRAND SPECIFIED) test strip Use to test blood sugar 3 times daily or as directed. 90 strip 3     blood glucose (NO BRAND SPECIFIED) test strip Use to test blood sugars 2-4 times daily or as directed 300 strip 3     blood glucose monitoring (NO BRAND SPECIFIED) meter device kit Use to test blood sugar 3 times daily or as directed. 1 kit 0     blood glucose monitoring (NO BRAND SPECIFIED) meter device kit Use to test blood sugar 2-4 times daily or as directed. 1 kit 0     blood glucose monitoring (NO BRAND SPECIFIED) test strip Use to test blood sugars 2 times daily or as directed 100 strip 3      blood glucose monitoring (ONE TOUCH DELICA) lancets Use to test blood sugars 2 times daily or as directed. 100 each 3     clotrimazole-betamethasone (LOTRISONE) 1-0.05 % external cream Apply topically 2 times daily 15 g 3     Continuous Blood Gluc  (DEXCOM G5  KIT) ANTONIO Use to read blood sugars as per 's instructions. 1 each 0     fluorometholone (FML LIQUIFILM) 0.1 % ophthalmic suspension Place 1 drop into the right eye 3 times daily 1 Bottle 0     fluorometholone (FML LIQUIFILM) 0.1 % ophthalmic suspension Place 1 drop Into the left eye 2 times daily 1 Bottle 0     GINSENG PO Take by mouth daily       losartan (COZAAR) 25 MG tablet Take 1 tablet (25 mg) by mouth daily 90 tablet 3     metFORMIN (GLUCOPHAGE-XR) 500 MG 24 hr tablet TAKE 1 TABLET BY MOUTH TWICE A DAY WITH MEALS 180 tablet 3     metoprolol succinate (TOPROL XL) 25 MG 24 hr tablet Take 1 tablet (25 mg) by mouth daily 90 tablet 2     Multiple Vitamin (MULTIVITAMINS PO) Take 1 tablet by mouth daily.       nicotine (NICORETTE) 2 MG gum Place 1 each (2 mg) inside cheek as needed for smoking cessation *CHEW APPROX. 20 PCS PER DAY AS DIRECTED For additional refills, please schedule a follow-up appointment with Dr Harris for October 2020 at [phone] each 2     olopatadine (PATANOL) 0.1 % ophthalmic solution Place 1 drop into both eyes 2 times daily 1 Bottle 11     Omega-3 Fatty Acids (FISH OIL PO) Take 1 g by mouth daily        psyllium (METAMUCIL) 58.6 % POWD Take 2 teaspoonful by mouth daily       sildenafil (VIAGRA) 100 MG tablet Take 0.5-1 tablets ( mg) by mouth daily as needed (ED) For additional refills, please schedule a follow-up appointment with Dr Harris for October 2020 at [phone] 8 tablet 2     trimethoprim-polymyxin b (POLYTRIM) 84407-3.1 UNIT/ML-% ophthalmic solution Place 1-2 drops Into the left eye every 6 hours 10 mL 0      Allergies   Allergen Reactions     Seasonal Allergies       Social  "History     Tobacco Use     Smoking status: Former Smoker     Packs/day: 1.00     Years: 25.00     Pack years: 25.00     Start date:      Quit date: 10/31/2001     Years since quittin.0     Smokeless tobacco: Never Used   Substance Use Topics     Alcohol use: Yes     Comment: scotch 2 times a week, wine 5 days per week at drs request     Drug use: No      Family History   Problem Relation Age of Onset     Osteoporosis Mother      Diabetes Maternal Grandmother      Glaucoma No family hx of      Macular Degeneration No family hx of       ROS:   Gen- no fevers/chills   Rheum - no morning stiffness   Derm - no rash/ redness   Neuro - no numbness, no tingling   Remainder of ROS negative.     Exam:   Ht 1.803 m (5' 11\")   Wt 94.3 kg (208 lb)   BMI 29.01 kg/m         Bilateral Knee:   ROM: 0-130; Crepitus: YES   Effusion: NO ; Swelling: NO   Strength: Full in flexion/ extension   Tenderness: Patella - NO Medial joint line - YES - R>L; Lateral joint line - NO; Quad tendon - NO; Patellar tendon- NO; Hamstring - NO.   Cruciates: anterior drawer - neg/posterior drawer -neg. Lachman - neg   Collaterals: varus -neg/valgus -neg.   Patella: patellar compression - neg; single leg bend- neg   Meniscus: Joe - neg      (M17.0) Primary osteoarthritis of both knees  (primary encounter diagnosis)  Comment: exam was very reassuring; the majority of his pain is w/ direct pressure on medial compartment as opposed to functionally w/ standing/ walking/ stairs. I encouraged him to focus on strengthening in PT and wt loss to help prevent progression of OA. Will tx focal tenderness w/ topical steroid. He can continue to use Aleve prn. I will see him back for a repeat trial of viscos once injections are approved.   Plan: PHYSICAL THERAPY REFERRAL (External-Prints),         diclofenac (VOLTAREN) 1 % topical gel, hylan         (SYNVISC ONE) 48 MG/6ML injection            Anton Ha MD  2020  11:36 AM              "

## 2020-11-02 NOTE — PROGRESS NOTES
Pt is a 64 year old male last seen by Dr Daigle in 11/2019 here today for:     Bilateral Medial Knee pain :   Duration? 5 years  Injury/ Inciting activity? Sleeping knee on knee  Pop? No  Swelling? No  Limited motion? No  Locking/ Catching? No  Giving way/ instability? No  Imaging? 9/2019 - L knee xray w/ mild medial compartment OA   Treatment? CSI, Gel One injection, and HEP -> unsure if the injections helped    Pain is focally medial  Pain is worse sleeping knee on knee    SH: his son is a sports med physician at [employer]; did training in FM at  [id number]     Past Medical History:   Diagnosis Date     Arthritis      Back pains, lower      Chest pain 7/28/2014     Hypertension      Myocarditis (H)      Other abnormal glucose 7/21/2011     Other and unspecified hyperlipidemia 7/21/2011     Subclinical hypothyroidism       Past Surgical History:   Procedure Laterality Date     COLONOSCOPY  1998    fistula      CORONARY ANGIOGRAPHY ADULT ORDER  07/29/2014    normal coronary arteries      Current Outpatient Medications   Medication Sig Dispense Refill     aspirin 81 MG tablet Take 1 tablet by mouth daily.       blood glucose (NO BRAND SPECIFIED) lancets standard Use to test blood sugar 3 times daily or as directed. 90 each 3     blood glucose (NO BRAND SPECIFIED) test strip Use to test blood sugar 3 times daily or as directed. 90 strip 3     blood glucose (NO BRAND SPECIFIED) test strip Use to test blood sugars 2-4 times daily or as directed 300 strip 3     blood glucose monitoring (NO BRAND SPECIFIED) meter device kit Use to test blood sugar 3 times daily or as directed. 1 kit 0     blood glucose monitoring (NO BRAND SPECIFIED) meter device kit Use to test blood sugar 2-4 times daily or as directed. 1 kit 0     blood glucose monitoring (NO BRAND SPECIFIED) test strip Use to test blood sugars 2 times daily or as directed 100 strip 3     blood glucose monitoring (ONE TOUCH DELICA) lancets Use to test blood sugars 2 times daily  or as directed. 100 each 3     clotrimazole-betamethasone (LOTRISONE) 1-0.05 % external cream Apply topically 2 times daily 15 g 3     Continuous Blood Gluc  (DEXCOM G5  KIT) ANTONIO Use to read blood sugars as per 's instructions. 1 each 0     fluorometholone (FML LIQUIFILM) 0.1 % ophthalmic suspension Place 1 drop into the right eye 3 times daily 1 Bottle 0     fluorometholone (FML LIQUIFILM) 0.1 % ophthalmic suspension Place 1 drop Into the left eye 2 times daily 1 Bottle 0     GINSENG PO Take by mouth daily       losartan (COZAAR) 25 MG tablet Take 1 tablet (25 mg) by mouth daily 90 tablet 3     metFORMIN (GLUCOPHAGE-XR) 500 MG 24 hr tablet TAKE 1 TABLET BY MOUTH TWICE A DAY WITH MEALS 180 tablet 3     metoprolol succinate (TOPROL XL) 25 MG 24 hr tablet Take 1 tablet (25 mg) by mouth daily 90 tablet 2     Multiple Vitamin (MULTIVITAMINS PO) Take 1 tablet by mouth daily.       nicotine (NICORETTE) 2 MG gum Place 1 each (2 mg) inside cheek as needed for smoking cessation *CHEW APPROX. 20 PCS PER DAY AS DIRECTED For additional refills, please schedule a follow-up appointment with Dr Harris for October 2020 at [phone] each 2     olopatadine (PATANOL) 0.1 % ophthalmic solution Place 1 drop into both eyes 2 times daily 1 Bottle 11     Omega-3 Fatty Acids (FISH OIL PO) Take 1 g by mouth daily        psyllium (METAMUCIL) 58.6 % POWD Take 2 teaspoonful by mouth daily       sildenafil (VIAGRA) 100 MG tablet Take 0.5-1 tablets ( mg) by mouth daily as needed (ED) For additional refills, please schedule a follow-up appointment with Dr Harris for October 2020 at [phone] tablet 2     trimethoprim-polymyxin b (POLYTRIM) 92966-1.1 UNIT/ML-% ophthalmic solution Place 1-2 drops Into the left eye every 6 hours 10 mL 0      Allergies   Allergen Reactions     Seasonal Allergies       Social History     Tobacco Use     Smoking status: Former Smoker     Packs/day: 1.00     Years: 25.00  "    Pack years: 25.00     Start date:      Quit date: 10/31/2001     Years since quittin.0     Smokeless tobacco: Never Used   Substance Use Topics     Alcohol use: Yes     Comment: scotch 2 times a week, wine 5 days per week at drs request     Drug use: No      Family History   Problem Relation Age of Onset     Osteoporosis Mother      Diabetes Maternal Grandmother      Glaucoma No family hx of      Macular Degeneration No family hx of       ROS:   Gen- no fevers/chills   Rheum - no morning stiffness   Derm - no rash/ redness   Neuro - no numbness, no tingling   Remainder of ROS negative.     Exam:   Ht 1.803 m (5' 11\")   Wt 94.3 kg (208 lb)   BMI 29.01 kg/m         Bilateral Knee:   ROM: 0-130; Crepitus: YES   Effusion: NO ; Swelling: NO   Strength: Full in flexion/ extension   Tenderness: Patella - NO Medial joint line - YES - R>L; Lateral joint line - NO; Quad tendon - NO; Patellar tendon- NO; Hamstring - NO.   Cruciates: anterior drawer - neg/posterior drawer -neg. Lachman - neg   Collaterals: varus -neg/valgus -neg.   Patella: patellar compression - neg; single leg bend- neg   Meniscus: Joe - neg      (M17.0) Primary osteoarthritis of both knees  (primary encounter diagnosis)  Comment: exam was very reassuring; the majority of his pain is w/ direct pressure on medial compartment as opposed to functionally w/ standing/ walking/ stairs. I encouraged him to focus on strengthening in PT and wt loss to help prevent progression of OA. Will tx focal tenderness w/ topical steroid. He can continue to use Aleve prn. I will see him back for a repeat trial of viscos once injections are approved.   Plan: PHYSICAL THERAPY REFERRAL (External-Prints),         diclofenac (VOLTAREN) 1 % topical gel, hylan         (SYNVISC ONE) 48 MG/6ML injection            Anton Ha MD  2020  11:36 AM          "

## 2020-11-03 DIAGNOSIS — M17.0 PRIMARY OSTEOARTHRITIS OF BOTH KNEES: Primary | ICD-10-CM

## 2020-11-03 RX ORDER — HYALURONATE SOD, CROSS-LINKED 30 MG/3 ML
30 SYRINGE (ML) INTRAARTICULAR ONCE
Qty: 6 ML | Refills: 0
Start: 2020-11-03 | End: 2020-11-03

## 2020-11-05 DIAGNOSIS — M54.41 CHRONIC BILATERAL LOW BACK PAIN WITH RIGHT-SIDED SCIATICA: Primary | ICD-10-CM

## 2020-11-05 DIAGNOSIS — G89.29 CHRONIC BILATERAL LOW BACK PAIN WITH RIGHT-SIDED SCIATICA: Primary | ICD-10-CM

## 2020-11-06 ENCOUNTER — HOSPITAL ENCOUNTER (OUTPATIENT)
Dept: MRI IMAGING | Facility: CLINIC | Age: 65
Discharge: HOME | End: 2020-11-06
Attending: INTERNAL MEDICINE | Admitting: INTERNAL MEDICINE
Payer: COMMERCIAL

## 2020-11-06 DIAGNOSIS — M54.89 OTHER BACK PAIN, UNSPECIFIED CHRONICITY: ICD-10-CM

## 2020-11-06 DIAGNOSIS — R73.09 INCREASED GLUCOSE LEVEL: ICD-10-CM

## 2020-11-06 DIAGNOSIS — I10 BENIGN ESSENTIAL HYPERTENSION: ICD-10-CM

## 2020-11-06 PROCEDURE — 72148 MRI LUMBAR SPINE W/O DYE: CPT

## 2020-11-06 PROCEDURE — 72148 MRI LUMBAR SPINE W/O DYE: CPT | Mod: 26 | Performed by: RADIOLOGY

## 2020-11-09 ASSESSMENT — ENCOUNTER SYMPTOMS
NUMBNESS: 1
WEAKNESS: 0
NECK PAIN: 0
DISTURBANCES IN COORDINATION: 0
MUSCLE WEAKNESS: 0
ARTHRALGIAS: 1
SEIZURES: 0
MEMORY LOSS: 0
LOSS OF CONSCIOUSNESS: 0
PARALYSIS: 0
DIZZINESS: 0
BACK PAIN: 1
HEADACHES: 0
TINGLING: 0
STIFFNESS: 0
JOINT SWELLING: 0
SPEECH CHANGE: 0
MUSCLE CRAMPS: 0
MYALGIAS: 0
TREMORS: 0

## 2020-11-10 ENCOUNTER — PRE VISIT (OUTPATIENT)
Dept: ORTHOPEDICS | Facility: CLINIC | Age: 65
End: 2020-11-10

## 2020-11-10 ENCOUNTER — OFFICE VISIT (OUTPATIENT)
Dept: ORTHOPEDICS | Facility: CLINIC | Age: 65
End: 2020-11-10
Payer: COMMERCIAL

## 2020-11-10 ENCOUNTER — ANCILLARY PROCEDURE (OUTPATIENT)
Dept: GENERAL RADIOLOGY | Facility: CLINIC | Age: 65
End: 2020-11-10
Attending: ORTHOPAEDIC SURGERY
Payer: COMMERCIAL

## 2020-11-10 DIAGNOSIS — M54.41 CHRONIC BILATERAL LOW BACK PAIN WITH RIGHT-SIDED SCIATICA: Primary | ICD-10-CM

## 2020-11-10 DIAGNOSIS — G89.29 CHRONIC BILATERAL LOW BACK PAIN WITH RIGHT-SIDED SCIATICA: Primary | ICD-10-CM

## 2020-11-10 PROCEDURE — 99204 OFFICE O/P NEW MOD 45 MIN: CPT | Mod: GC | Performed by: ORTHOPAEDIC SURGERY

## 2020-11-10 PROCEDURE — 72110 X-RAY EXAM L-2 SPINE 4/>VWS: CPT | Performed by: RADIOLOGY

## 2020-11-10 NOTE — LETTER
11/10/2020         RE: Sujata Valencia  [address] [place] [address]        Dear Colleague,    Thank you for referring your patient, Sujata Valencia, to the [facility] [place]. Please see a copy of my visit note below.    Spine Surgery Consultation    REFERRING PHYSICIAN: Wes Harris   PRIMARY CARE PHYSICIAN: Wes Harris           Chief Complaint:   Consult (lumbar spine stenosis right leg symptoms going down thr gith buttock and into the posterior leg )      History of Present Illness:  Symptom Profile Including: location of symptoms, onset, severity, exacerbating/alleviating factors, previous treatments:        Sujata Valencia is a 64 year old male who presents for consultation of his low back pain and right greater than left S1 radicular symptoms.  Pain is worsened over the last couple years.  States that both of his feet will go numb if he stands for prolonged period of time.  Pain improves if he flexes over or uses a shopping cart while shopping.  No radicular pain in the front of his thighs.  He also has nagging low back pain which is central.    Nonoperative interventions   1. Physical therapy over the last couple years with improvement in his pain and function.  He has continued home exercises.    2. Takes 2 tabs of Aleve in the morning  3. No prior injections         Past Medical History:     Past Medical History:   Diagnosis Date     Arthritis      Back pains, lower      Chest pain 7/28/2014     Hypertension      Myocarditis (H)      Other abnormal glucose 7/21/2011     Other and unspecified hyperlipidemia 7/21/2011     Subclinical hypothyroidism             Past Surgical History:     Past Surgical History:   Procedure Laterality Date     COLONOSCOPY  1998    fistula      CORONARY ANGIOGRAPHY ADULT ORDER  07/29/2014    normal coronary arteries            Social History:     Social History     Tobacco Use     Smoking status: Former Smoker      Packs/day: 1.00     Years: 25.00     Pack years: 25.00     Start date:      Quit date: 10/31/2001     Years since quittin.0     Smokeless tobacco: Never Used   Substance Use Topics     Alcohol use: Yes     Comment: scotch 2 times a week, wine 5 days per week at drs request            Family History:     Family History   Problem Relation Age of Onset     Osteoporosis Mother      Diabetes Maternal Grandmother      Glaucoma No family hx of      Macular Degeneration No family hx of             Allergies:     Allergies   Allergen Reactions     Seasonal Allergies             Medications:     Current Outpatient Medications   Medication     aspirin 81 MG tablet     blood glucose (NO BRAND SPECIFIED) lancets standard     blood glucose (NO BRAND SPECIFIED) test strip     blood glucose (NO BRAND SPECIFIED) test strip     blood glucose monitoring (NO BRAND SPECIFIED) meter device kit     blood glucose monitoring (NO BRAND SPECIFIED) meter device kit     blood glucose monitoring (NO BRAND SPECIFIED) test strip     blood glucose monitoring (ONE TOUCH DELICA) lancets     clotrimazole-betamethasone (LOTRISONE) 1-0.05 % external cream     Continuous Blood Gluc  (DEXCOM G5  KIT) ANTONIO     diclofenac (VOLTAREN) 1 % topical gel     fluorometholone (FML LIQUIFILM) 0.1 % ophthalmic suspension     fluorometholone (FML LIQUIFILM) 0.1 % ophthalmic suspension     GINSENG PO     losartan (COZAAR) 25 MG tablet     metFORMIN (GLUCOPHAGE-XR) 500 MG 24 hr tablet     metoprolol succinate (TOPROL XL) 25 MG 24 hr tablet     Multiple Vitamin (MULTIVITAMINS PO)     nicotine (NICORETTE) 2 MG gum     olopatadine (PATANOL) 0.1 % ophthalmic solution     Omega-3 Fatty Acids (FISH OIL PO)     psyllium (METAMUCIL) 58.6 % POWD     sildenafil (VIAGRA) 100 MG tablet     trimethoprim-polymyxin b (POLYTRIM) 96256-5.1 UNIT/ML-% ophthalmic solution     Current Facility-Administered Medications   Medication     cross-Linked Hyaluronate  (GEL-ONE) injection PRSY 30 mg     lidocaine (PF) (XYLOCAINE) 1 % injection 5 mL     triamcinolone (KENALOG-40) injection 40 mg             Review of Systems:     A 10 point ROS was performed and reviewed. Specific responses to these questions are noted at the end of the document.         Physical Exam:   Vitals: There were no vitals taken for this visit.  Constitutional: awake, alert, cooperative, no apparent distress, appears stated age.    Eyes: The sclera are white.  Ears, Nose, Throat: The trachea is midline.  Psychiatric: The patient has a normal affect.  Respiratory: breathing non-labored  Cardiovascular: The extremities are warm and perfused.  Skin: no obvious rashes or lesions.  Musculoskeletal, Neurologic, and Spine:        Lumbar Spine:    Appearance - No gross stepoffs or deformities    Motor -     L2-3: Hip flexion 5/5 R and 5/5 L strength          L3/4:  Knee extension R 5/5 and L 5/5 strength         L4/5:  Foot dorsiflexion R 5/5 L 5/5 and       EHL dorsiflexion R 4/5 L 4/5 strength         S1:  Plantarflexion/Peroneal Muscles  R 5/5 and L 5/5 strength    Sensation: intact to light touch L3-S1 distribution BLE      Neurologic:      REFLEXES Left Right   Patella 0+ 1+   Ankle jerk 0+ 1+   Babinski No upgoing great toe No upgoing great toe   Clonus 0 beats 0 beats     Hip Exam:  No pain with hip log roll and no tenderness over the greater trochanters.    Alignment:  Patient stands with a neutral standing sagittal balance.           Imaging:   We ordered and independently reviewed new radiographs at this clinic visit. The results were discussed with the patient.  Findings include:     Xray L spine 11/10/20: Multilevel spondylosis with most pronounced changes at L4-5, L5-S1.    MR L spine 11/6/2020: L4-5 spondylosis with severe spinal canal stenosis.  Moderate left and mild to moderate right neuroforaminal stenosis at  L4-5.  Moderate spinal canal stenosis and moderate left and mild to moderate right  neural foraminal stenosis at L3-4.  L5-S1 disc bulge abuts and likely impinges traversing right S1 nerve root.           Assessment and Plan:   Assessment:  64 year old male with past medical history myocarditis, hypertension, hyperlipidemia, prior alcohol abuse, prediabetes with severe spinal canal stenosis at L3-4 and L4-5, with possible mild right S1 root impingement from disc herniation.  He has not yet exhausted all nonoperative options.     Plan:  1. Gabapentin prescription ordered today  2. Right L4-5 transforaminal injection ordered  3. Physical therapy prescription ordered for lumbar spine work  4. Follow-up in 4 to 6 weeks to assess progress with the above plan    Marilee Hawley MD   Orthopedic Surgery, PGY4  [phone]    Attending MD (Dr. Eliot Allen) :  I reviewed and verified the history and physical exam of the patient and discussed the patient's management with the other clinical providers involved in this patient's care including any involved residents or physicians assistants. I reviewed the above note and agree with the documented findings and plan of care, which were communicated to the patient.      Eliot Allen MD    Respectfully,  Eliot Allen MD  Spine Surgery  [facility]        Answers for HPI/ROS submitted by the patient on 11/9/2020   General Symptoms: No  Skin Symptoms: No  HENT Symptoms: No  EYE SYMPTOMS: No  HEART SYMPTOMS: No  LUNG SYMPTOMS: No  INTESTINAL SYMPTOMS: No  URINARY SYMPTOMS: No  REPRODUCTIVE SYMPTOMS: No  SKELETAL SYMPTOMS: Yes  BLOOD SYMPTOMS: No  NERVOUS SYSTEM SYMPTOMS: Yes  MENTAL HEALTH SYMPTOMS: No  Back pain: Yes  Muscle aches: No  Neck pain: No  Swollen joints: No  Joint pain: Yes  Bone pain: No  Muscle cramps: No  Muscle weakness: No  Joint stiffness: No  Bone fracture: No  Trouble with coordination: No  Dizziness or trouble with balance: No  Fainting or black-out spells: No  Memory loss: No  Headache:  No  Seizures: No  Speech problems: No  Tingling: No  Tremor: No  Weakness: No  Difficulty walking: Yes  Paralysis: No  Numbness: Yes

## 2020-11-10 NOTE — NURSING NOTE
Reason For Visit:   Chief Complaint   Patient presents with     Consult     lumbar spine stenosis right leg symptoms going down thr gith buttock and into the posterior leg        Primary MD: Wes Harris  Ref. MD: Kimberly     ?  No  Occupation retired .  Currently working? No.  Work status?  Retired   Date of injury: 1986 off and on for the past few decades   Type of injury: chronic .  Date of surgery: none   Type of surgery: none .  Smoker: No  Request smoking cessation information: No    There were no vitals taken for this visit.         Oswestry (JAZ) Questionnaire    OSWESTRY DISABILITY INDEX 11/9/2020   Count 10   Sum 19   Oswestry Score (%) 38   Some recent data might be hidden            Neck Disability Index (NDI) Questionnaire    No flowsheet data found.                Promis 10 Assessment    PROMIS 10 11/9/2020   In general, would you say your health is: Good   In general, would you say your quality of life is: Fair   In general, how would you rate your physical health? Good   In general, how would you rate your mental health, including your mood and your ability to think? Good   In general, how would you rate your satisfaction with your social activities and relationships? Good   In general, please rate how well you carry out your usual social activities and roles Fair   To what extent are you able to carry out your everyday physical activities such as walking, climbing stairs, carrying groceries, or moving a chair? A little   How often have you been bothered by emotional problems such as feeling anxious, depressed or irritable? Never   How would you rate your fatigue on average? Mild   How would you rate your pain on average?   0 = No Pain  to  10 = Worst Imaginable Pain 8   In general, would you say your health is: 3   In general, would you say your quality of life is: 2   In general, how would you rate your physical health? 3   In general, how would you rate your mental health,  including your mood and your ability to think? 3   In general, how would you rate your satisfaction with your social activities and relationships? 3   In general, please rate how well you carry out your usual social activities and roles. (This includes activities at home, at work and in your community, and responsibilities as a parent, child, spouse, employee, friend, etc.) 2   To what extent are you able to carry out your everyday physical activities such as walking, climbing stairs, carrying groceries, or moving a chair? 2   In the past 7 days, how often have you been bothered by emotional problems such as feeling anxious, depressed, or irritable? 1   In the past 7 days, how would you rate your fatigue on average? 2   In the past 7 days, how would you rate your pain on average, where 0 means no pain, and 10 means worst imaginable pain? 8   Global Mental Health Score 13   Global Physical Health Score 11   PROMIS TOTAL - SUBSCORES 24   Some recent data might be hidden                Graham Resendiz ATC

## 2020-11-10 NOTE — PROGRESS NOTES
Spine Surgery Consultation    REFERRING PHYSICIAN: Wes Harris   PRIMARY CARE PHYSICIAN: Wes Harris           Chief Complaint:   Consult (lumbar spine stenosis right leg symptoms going down thr gith buttock and into the posterior leg )      History of Present Illness:  Symptom Profile Including: location of symptoms, onset, severity, exacerbating/alleviating factors, previous treatments:        uSjata Valencia is a 64 year old male who presents for consultation of his low back pain and right greater than left S1 radicular symptoms.  Pain is worsened over the last couple years.  States that both of his feet will go numb if he stands for prolonged period of time.  Pain improves if he flexes over or uses a shopping cart while shopping.  No radicular pain in the front of his thighs.  He also has nagging low back pain which is central.    Nonoperative interventions   1. Physical therapy over the last couple years with improvement in his pain and function.  He has continued home exercises.    2. Takes 2 tabs of Aleve in the morning  3. No prior injections         Past Medical History:     Past Medical History:   Diagnosis Date     Arthritis      Back pains, lower      Chest pain 2014     Hypertension      Myocarditis (H)      Other abnormal glucose 2011     Other and unspecified hyperlipidemia 2011     Subclinical hypothyroidism             Past Surgical History:     Past Surgical History:   Procedure Laterality Date     COLONOSCOPY      fistula      CORONARY ANGIOGRAPHY ADULT ORDER  2014    normal coronary arteries            Social History:     Social History     Tobacco Use     Smoking status: Former Smoker     Packs/day: 1.00     Years: 25.00     Pack years: 25.00     Start date:      Quit date: 10/31/2001     Years since quittin.0     Smokeless tobacco: Never Used   Substance Use Topics     Alcohol use: Yes     Comment: scotch 2 times a week, wine 5 days per week at  onelia request            Family History:     Family History   Problem Relation Age of Onset     Osteoporosis Mother      Diabetes Maternal Grandmother      Glaucoma No family hx of      Macular Degeneration No family hx of             Allergies:     Allergies   Allergen Reactions     Seasonal Allergies             Medications:     Current Outpatient Medications   Medication     aspirin 81 MG tablet     blood glucose (NO BRAND SPECIFIED) lancets standard     blood glucose (NO BRAND SPECIFIED) test strip     blood glucose (NO BRAND SPECIFIED) test strip     blood glucose monitoring (NO BRAND SPECIFIED) meter device kit     blood glucose monitoring (NO BRAND SPECIFIED) meter device kit     blood glucose monitoring (NO BRAND SPECIFIED) test strip     blood glucose monitoring (ONE TOUCH DELICA) lancets     clotrimazole-betamethasone (LOTRISONE) 1-0.05 % external cream     Continuous Blood Gluc  (DEXCOM G5  KIT) ANTONIO     diclofenac (VOLTAREN) 1 % topical gel     fluorometholone (FML LIQUIFILM) 0.1 % ophthalmic suspension     fluorometholone (FML LIQUIFILM) 0.1 % ophthalmic suspension     GINSENG PO     losartan (COZAAR) 25 MG tablet     metFORMIN (GLUCOPHAGE-XR) 500 MG 24 hr tablet     metoprolol succinate (TOPROL XL) 25 MG 24 hr tablet     Multiple Vitamin (MULTIVITAMINS PO)     nicotine (NICORETTE) 2 MG gum     olopatadine (PATANOL) 0.1 % ophthalmic solution     Omega-3 Fatty Acids (FISH OIL PO)     psyllium (METAMUCIL) 58.6 % POWD     sildenafil (VIAGRA) 100 MG tablet     trimethoprim-polymyxin b (POLYTRIM) 81902-2.1 UNIT/ML-% ophthalmic solution     Current Facility-Administered Medications   Medication     cross-Linked Hyaluronate (GEL-ONE) injection PRSY 30 mg     lidocaine (PF) (XYLOCAINE) 1 % injection 5 mL     triamcinolone (KENALOG-40) injection 40 mg             Review of Systems:     A 10 point ROS was performed and reviewed. Specific responses to these questions are noted at the end of the  document.         Physical Exam:   Vitals: There were no vitals taken for this visit.  Constitutional: awake, alert, cooperative, no apparent distress, appears stated age.    Eyes: The sclera are white.  Ears, Nose, Throat: The trachea is midline.  Psychiatric: The patient has a normal affect.  Respiratory: breathing non-labored  Cardiovascular: The extremities are warm and perfused.  Skin: no obvious rashes or lesions.  Musculoskeletal, Neurologic, and Spine:        Lumbar Spine:    Appearance - No gross stepoffs or deformities    Motor -     L2-3: Hip flexion 5/5 R and 5/5 L strength          L3/4:  Knee extension R 5/5 and L 5/5 strength         L4/5:  Foot dorsiflexion R 5/5 L 5/5 and       EHL dorsiflexion R 4/5 L 4/5 strength         S1:  Plantarflexion/Peroneal Muscles  R 5/5 and L 5/5 strength    Sensation: intact to light touch L3-S1 distribution BLE      Neurologic:      REFLEXES Left Right   Patella 0+ 1+   Ankle jerk 0+ 1+   Babinski No upgoing great toe No upgoing great toe   Clonus 0 beats 0 beats     Hip Exam:  No pain with hip log roll and no tenderness over the greater trochanters.    Alignment:  Patient stands with a neutral standing sagittal balance.           Imaging:   We ordered and independently reviewed new radiographs at this clinic visit. The results were discussed with the patient.  Findings include:     Xray L spine 11/10/20: Multilevel spondylosis with most pronounced changes at L4-5, L5-S1.    MR L spine 11/6/2020: L4-5 spondylosis with severe spinal canal stenosis.  Moderate left and mild to moderate right neuroforaminal stenosis at  L4-5.  Moderate spinal canal stenosis and moderate left and mild to moderate right neural foraminal stenosis at L3-4.  L5-S1 disc bulge abuts and likely impinges traversing right S1 nerve root.           Assessment and Plan:   Assessment:  64 year old male with past medical history myocarditis, hypertension, hyperlipidemia, prior alcohol abuse, prediabetes  with severe spinal canal stenosis at L3-4 and L4-5, with possible mild right S1 root impingement from disc herniation.  He has not yet exhausted all nonoperative options.     Plan:  1. Gabapentin prescription ordered today  2. Right L4-5 transforaminal injection ordered  3. Physical therapy prescription ordered for lumbar spine work  4. Follow-up in 4 to 6 weeks to assess progress with the above plan    Marilee Hawley MD   Orthopedic Surgery, PGY4  [phone]    Attending MD (Dr. Eliot Allen) :  I reviewed and verified the history and physical exam of the patient and discussed the patient's management with the other clinical providers involved in this patient's care including any involved residents or physicians assistants. I reviewed the above note and agree with the documented findings and plan of care, which were communicated to the patient.      Eliot Allen MD    Respectfully,  Eliot Allen MD  Spine Surgery  [facility]        Answers for HPI/ROS submitted by the patient on 11/9/2020   General Symptoms: No  Skin Symptoms: No  HENT Symptoms: No  EYE SYMPTOMS: No  HEART SYMPTOMS: No  LUNG SYMPTOMS: No  INTESTINAL SYMPTOMS: No  URINARY SYMPTOMS: No  REPRODUCTIVE SYMPTOMS: No  SKELETAL SYMPTOMS: Yes  BLOOD SYMPTOMS: No  NERVOUS SYSTEM SYMPTOMS: Yes  MENTAL HEALTH SYMPTOMS: No  Back pain: Yes  Muscle aches: No  Neck pain: No  Swollen joints: No  Joint pain: Yes  Bone pain: No  Muscle cramps: No  Muscle weakness: No  Joint stiffness: No  Bone fracture: No  Trouble with coordination: No  Dizziness or trouble with balance: No  Fainting or black-out spells: No  Memory loss: No  Headache: No  Seizures: No  Speech problems: No  Tingling: No  Tremor: No  Weakness: No  Difficulty walking: Yes  Paralysis: No  Numbness: Yes

## 2020-11-12 ENCOUNTER — TELEPHONE (OUTPATIENT)
Dept: ORTHOPEDICS | Facility: CLINIC | Age: 65
End: 2020-11-12

## 2020-11-12 DIAGNOSIS — M54.41 CHRONIC BILATERAL LOW BACK PAIN WITH RIGHT-SIDED SCIATICA: Primary | ICD-10-CM

## 2020-11-12 DIAGNOSIS — G89.29 CHRONIC BILATERAL LOW BACK PAIN WITH RIGHT-SIDED SCIATICA: Primary | ICD-10-CM

## 2020-11-12 RX ORDER — GABAPENTIN 100 MG/1
100 CAPSULE ORAL 3 TIMES DAILY PRN
Qty: 90 CAPSULE | Refills: 0 | Status: SHIPPED | OUTPATIENT
Start: 2020-11-12 | End: 2020-12-14

## 2020-11-12 NOTE — TELEPHONE ENCOUNTER
RN sent script per standing order.  Mycharted patient with details    Leonor Hidalgo RN      [facility]    Phone Message    May a detailed message be left on voicemail: yes     Reason for Call: Medication Question or concern regarding medication   Prescription Clarification  Name of Medication: gabapentin  Prescribing Provider: Eliot Allen MD   Pharmacy: [facility], [address], [place]   What on the order needs clarification? Dr. Allen, on 11/10/2020, told patient to start on gabapentin but the pharmacy has not received the prescription. Please send prescription to pharmacy.   Also pt would like a call to confirm prescription was sent to pharmacy and details on medication.          Action Taken: Message routed to:  [facility] ([facility] ortho    Travel Screening: Not Applicable

## 2020-11-16 NOTE — PROGRESS NOTES
HPI:    Last in-person visit with me was 9/29/2020. Overall stable. His musculoskeletal issues are stable. He has been talking to his son (who is a physician) about re-starting a statin medication for increased lipids. We discussed the benefits of this and he is agreeable to starting at a low dose. No other HEENT, cardiopulmonary, abdominal, , neurological, systemic, psychiatric, lymphatic, endocrine complaints.     Past Medical History:   Diagnosis Date     Arthritis      Back pains, lower      Chest pain 7/28/2014     Hypertension      Myocarditis (H)      Other abnormal glucose 7/21/2011     Other and unspecified hyperlipidemia 7/21/2011     Subclinical hypothyroidism      Past Surgical History:   Procedure Laterality Date     COLONOSCOPY  1998    fistula      CORONARY ANGIOGRAPHY ADULT ORDER  07/29/2014    normal coronary arteries     PE:    Vitals noted, gen, nad, cooperative, alert, he is wearing a mask, neck supple nl rom, lungs with good air movement, RRR, S1, S2, no MRG, abdomen, no acute findings and grossly normal neurological exam.       A/P:    1. Influenza vaccination done 10/2/2020  2. Back pain; seen by Dr. Allen, ortho spine 11/20/2020 and has follow up 11/10/2020. He had MRI lumbar spine 11/6/2020  3. He was seen 11/2/2020, Dr. Ha, ortho for B knee pain and has follow up 11/17/2020.   4. DM2; A1c 10/2/2020 was 6.2%, he remains on Metformin; Microalbumin undetectable 10/2/2020  5. PSA done 10/2/2020 at 0.46  6. Fasting lipids 10/2/2020  and HDL 51; started 10 mg Atorvastatin today and ordered future fasting lipids and liver tests  7. HTN; a little monica today; but historically lower; he will follow    Total time spent 25 minutes.  More than 50% of the time spent with Mr. Valencia on counseling / coordinating his care

## 2020-11-16 NOTE — PROGRESS NOTES
Pt is a 64 year old male last seen on 11/2/20 here for follow up of:     Bilateral knee OA - here for visco injections   No change in pain  No swelling     Lumbar radic - has noted significant improvement w/ 100mg gabapentin  Wondering if he should hold on scheduling an injection     MRI lumbar spine - 11/6/20  Impression:   1. Multilevel lumbar spondylosis most prominent at L4-L5 with severe  spinal canal stenosis. Additionally, there is moderate left and mild  to moderate right neural foraminal stenosis at this level.  2. Moderate spinal canal stenosis and moderate left and mild to  moderate right neural foraminal stenosis at L3-4.  3. Disc bulge at L5-S1 abuts and likely impinges upon the traversing  right S1 nerve root.    Past Medical History:   Diagnosis Date     Arthritis      Back pains, lower      Chest pain 7/28/2014     Hypertension      Myocarditis (H)      Other abnormal glucose 7/21/2011     Other and unspecified hyperlipidemia 7/21/2011     Subclinical hypothyroidism       Current Outpatient Medications   Medication Sig Dispense Refill     aspirin 81 MG tablet Take 1 tablet by mouth daily.       blood glucose (NO BRAND SPECIFIED) lancets standard Use to test blood sugar 3 times daily or as directed. 90 each 3     blood glucose (NO BRAND SPECIFIED) test strip Use to test blood sugar 3 times daily or as directed. 90 strip 3     blood glucose (NO BRAND SPECIFIED) test strip Use to test blood sugars 2-4 times daily or as directed 300 strip 3     blood glucose monitoring (NO BRAND SPECIFIED) meter device kit Use to test blood sugar 3 times daily or as directed. 1 kit 0     blood glucose monitoring (NO BRAND SPECIFIED) meter device kit Use to test blood sugar 2-4 times daily or as directed. 1 kit 0     blood glucose monitoring (NO BRAND SPECIFIED) test strip Use to test blood sugars 2 times daily or as directed 100 strip 3     blood glucose monitoring (ONE TOUCH DELICA) lancets Use to test blood sugars 2  times daily or as directed. 100 each 3     clotrimazole-betamethasone (LOTRISONE) 1-0.05 % external cream Apply topically 2 times daily 15 g 3     Continuous Blood Gluc  (DEXCOM G5  KIT) ANTONIO Use to read blood sugars as per 's instructions. 1 each 0     diclofenac (VOLTAREN) 1 % topical gel Apply 2 g topically 4 times daily 100 g 3     fluorometholone (FML LIQUIFILM) 0.1 % ophthalmic suspension Place 1 drop into the right eye 3 times daily 1 Bottle 0     fluorometholone (FML LIQUIFILM) 0.1 % ophthalmic suspension Place 1 drop Into the left eye 2 times daily 1 Bottle 0     gabapentin (NEURONTIN) 100 MG capsule Take 1 capsule (100 mg) by mouth 3 times daily as needed for other (nerve pain) 90 capsule 0     GINSENG PO Take by mouth daily       losartan (COZAAR) 25 MG tablet Take 1 tablet (25 mg) by mouth daily 90 tablet 3     metFORMIN (GLUCOPHAGE-XR) 500 MG 24 hr tablet TAKE 1 TABLET BY MOUTH TWICE A DAY WITH MEALS 180 tablet 3     metoprolol succinate (TOPROL XL) 25 MG 24 hr tablet Take 1 tablet (25 mg) by mouth daily 90 tablet 2     Multiple Vitamin (MULTIVITAMINS PO) Take 1 tablet by mouth daily.       nicotine (NICORETTE) 2 MG gum Place 1 each (2 mg) inside cheek as needed for smoking cessation *CHEW APPROX. 20 PCS PER DAY AS DIRECTED For additional refills, please schedule a follow-up appointment with Dr Harris for October 2020 at [phone] each 2     olopatadine (PATANOL) 0.1 % ophthalmic solution Place 1 drop into both eyes 2 times daily 1 Bottle 11     Omega-3 Fatty Acids (FISH OIL PO) Take 1 g by mouth daily        psyllium (METAMUCIL) 58.6 % POWD Take 2 teaspoonful by mouth daily       sildenafil (VIAGRA) 100 MG tablet Take 0.5-1 tablets ( mg) by mouth daily as needed (ED) For additional refills, please schedule a follow-up appointment with Dr Harris for October 2020 at [phone] 8 tablet 2     trimethoprim-polymyxin b (POLYTRIM) 05655-0.1 UNIT/ML-% ophthalmic  solution Place 1-2 drops Into the left eye every 6 hours 10 mL 0      Allergies   Allergen Reactions     Seasonal Allergies       ROS:   Gen- no fevers/chills   Rheum - no morning stiffness   Derm - no rash/ redness   Neuro - no numbness, no tingling   Remainder of ROS negative.     Exam:     Bilateral Knee:   ROM: 0-130; Crepitus: YES   Effusion: NO ; Swelling: NO   Strength: Full in flexion/ extension   Tenderness: Patella - NO Medial joint line - YES - R>L; Lateral joint line - NO; Quad tendon - NO; Patellar tendon- NO; Hamstring - NO.   Patella: patellar compression - neg   Meniscus: Joe - neg      The pt was verbally consented. The R knee was sterilely prepped in usual fashion. Local anesthesia was achieved with 3-5 cc of 1% lidocaine. 3cc of Gel-One was injected via lateral approach without complication. Pt tolerated procedure well    The pt was verbally consented. The L knee was sterilely prepped in usual fashion. Local anesthesia was achieved with 3-5 cc of 1% lidocaine. 3cc of Gel-One was injected via lateral approach without complication. Pt tolerated procedure well    Large Joint Injection/Arthocentesis: bilateral knee    Date/Time: 11/17/2020 1:57 PM  Performed by: Anton Ha MD  Authorized by: Anton Ha MD     Indications:  Osteoarthritis  Needle Size:  22 G  Approach:  Anterolateral  Location:  Knee  Laterality:  Bilateral      Medications (Right):  30 mg cross-Linked Hyaluronate 30 MG/3ML; 2 mL lidocaine (PF) 1 %  Medications (Left):  30 mg cross-Linked Hyaluronate 30 MG/3ML; 2 mL lidocaine (PF) 1 %  Procedure discussed: discussed risks, benefits, and alternatives    Consent Given by:  Patient  Timeout: timeout called immediately prior to procedure    Prep: patient was prepped and draped in usual sterile fashion     Scribed by Carlos Eduardo De, ATC, ATC for  on 11/17/20 at 2 PM, based on providers statements to me.        (M17.0) Primary osteoarthritis of both knees  (primary  encounter diagnosis)  Comment: bilateral gel-one injections; f/u in 6 months  Plan: Large Joint Injection/Arthocentesis: bilateral knee         (M48.062) Lumbar stenosis with neurogenic claudication  Comment:   Plan: brief review of imaging and tx plan per pt request while son was on the phone; will hold on injection and cont meds and PT; cont to follow w/ Dr Allen of spine surgery      Anton Ha MD  November 17, 2020  2:18 PM

## 2020-11-17 ENCOUNTER — OFFICE VISIT (OUTPATIENT)
Dept: INTERNAL MEDICINE | Facility: CLINIC | Age: 65
End: 2020-11-17
Payer: COMMERCIAL

## 2020-11-17 ENCOUNTER — OFFICE VISIT (OUTPATIENT)
Dept: ORTHOPEDICS | Facility: CLINIC | Age: 65
End: 2020-11-17
Payer: COMMERCIAL

## 2020-11-17 VITALS — HEIGHT: 71 IN | WEIGHT: 208 LBS | BODY MASS INDEX: 29.12 KG/M2 | RESPIRATION RATE: 16 BRPM

## 2020-11-17 VITALS
WEIGHT: 204 LBS | HEART RATE: 94 BPM | DIASTOLIC BLOOD PRESSURE: 80 MMHG | BODY MASS INDEX: 28.56 KG/M2 | SYSTOLIC BLOOD PRESSURE: 149 MMHG | HEIGHT: 71 IN | OXYGEN SATURATION: 96 %

## 2020-11-17 DIAGNOSIS — M17.0 PRIMARY OSTEOARTHRITIS OF BOTH KNEES: Primary | ICD-10-CM

## 2020-11-17 DIAGNOSIS — M48.062 LUMBAR STENOSIS WITH NEUROGENIC CLAUDICATION: ICD-10-CM

## 2020-11-17 DIAGNOSIS — E78.00 HIGH CHOLESTEROL: Primary | ICD-10-CM

## 2020-11-17 PROCEDURE — 20610 DRAIN/INJ JOINT/BURSA W/O US: CPT | Mod: 50 | Performed by: FAMILY MEDICINE

## 2020-11-17 PROCEDURE — 99212 OFFICE O/P EST SF 10 MIN: CPT | Mod: 25 | Performed by: FAMILY MEDICINE

## 2020-11-17 PROCEDURE — 99214 OFFICE O/P EST MOD 30 MIN: CPT | Performed by: INTERNAL MEDICINE

## 2020-11-17 RX ORDER — ATORVASTATIN CALCIUM 10 MG/1
10 TABLET, FILM COATED ORAL DAILY
Qty: 90 TABLET | Refills: 1 | Status: SHIPPED | OUTPATIENT
Start: 2020-11-17 | End: 2021-05-07

## 2020-11-17 RX ORDER — LIDOCAINE HYDROCHLORIDE 10 MG/ML
2 INJECTION, SOLUTION EPIDURAL; INFILTRATION; INTRACAUDAL; PERINEURAL
Status: SHIPPED | OUTPATIENT
Start: 2020-11-17

## 2020-11-17 RX ADMIN — LIDOCAINE HYDROCHLORIDE 2 ML: 10 INJECTION, SOLUTION EPIDURAL; INFILTRATION; INTRACAUDAL; PERINEURAL at 13:57

## 2020-11-17 ASSESSMENT — MIFFLIN-ST. JEOR
SCORE: 1731.59
SCORE: 1745.61

## 2020-11-17 NOTE — NURSING NOTE
[address] [address]  Dept: [phone]  ______________________________________________________________________________    Patient: Sujata Valencia   : 1955   MRN: [id number]   2020    INVASIVE PROCEDURE SAFETY CHECKLIST    Date: 20   Procedure:Bilateral Gel one injections  Patient Name: Sujata Valencia  MRN: [id number]  YOB: 1955    Action: Complete sections as appropriate. Any discrepancy results in a HARD COPY until resolved.     PRE PROCEDURE:  Patient ID verified with 2 identifiers (name and  or MRN): Yes  Procedure and site verified with patient/designee (when able): Yes  Accurate consent documentation in medical record: Yes  H&P (or appropriate assessment) documented in medical record: Yes  H&P must be up to 20 days prior to procedure and updates within 24 hours of procedure as applicable: NA  Relevant diagnostic and radiology test results appropriately labeled and displayed as applicable: Yes  Procedure site(s) marked with provider initials: NA    TIMEOUT:  Time-Out performed immediately prior to starting procedure, including verbal and active participation of all team members addressing the following:Yes  * Correct patient identify  * Confirmed that the correct side and site are marked  * An accurate procedure consent form  * Agreement on the procedure to be done  * Correct patient position  * Relevant images and results are properly labeled and appropriately displayed  * The need to administer antibiotics or fluids for irrigation purposes during the procedure as applicable   * Safety precautions based on patient history or medication use    DURING PROCEDURE: Verification of correct person, site, and procedures any time the responsibility for care of the patient is transferred to another member of the care team.       Prior to injection, verified patient identity using patient's name and  date of birth.  Due to injection administration, patient instructed to remain in clinic for 15 minutes  afterwards, and to report any adverse reaction to me immediately.    Joint injection was performed.      Drug Amount Wasted:  None.  Vial/Syringe: Syringe  Expiration Date:  3/21      Carlos Eduardo De [facility]  November 17, 2020

## 2020-11-17 NOTE — NURSING NOTE
Chief Complaint   Patient presents with     Recheck Medication     follow up     Kimberly Nissen, EMT at 3:12 PM on 11/17/2020

## 2020-11-17 NOTE — LETTER
11/17/2020      RE: Sujata BARKER Erik  [address] [place] [address]       Pt is a 64 year old male last seen on 11/2/20 here for follow up of:     Bilateral knee OA - here for visco injections   No change in pain  No swelling     Lumbar radic - has noted significant improvement w/ 100mg gabapentin  Wondering if he should hold on scheduling an injection     MRI lumbar spine - 11/6/20  Impression:   1. Multilevel lumbar spondylosis most prominent at L4-L5 with severe  spinal canal stenosis. Additionally, there is moderate left and mild  to moderate right neural foraminal stenosis at this level.  2. Moderate spinal canal stenosis and moderate left and mild to  moderate right neural foraminal stenosis at L3-4.  3. Disc bulge at L5-S1 abuts and likely impinges upon the traversing  right S1 nerve root.    Past Medical History:   Diagnosis Date     Arthritis      Back pains, lower      Chest pain 7/28/2014     Hypertension      Myocarditis (H)      Other abnormal glucose 7/21/2011     Other and unspecified hyperlipidemia 7/21/2011     Subclinical hypothyroidism       Current Outpatient Medications   Medication Sig Dispense Refill     aspirin 81 MG tablet Take 1 tablet by mouth daily.       blood glucose (NO BRAND SPECIFIED) lancets standard Use to test blood sugar 3 times daily or as directed. 90 each 3     blood glucose (NO BRAND SPECIFIED) test strip Use to test blood sugar 3 times daily or as directed. 90 strip 3     blood glucose (NO BRAND SPECIFIED) test strip Use to test blood sugars 2-4 times daily or as directed 300 strip 3     blood glucose monitoring (NO BRAND SPECIFIED) meter device kit Use to test blood sugar 3 times daily or as directed. 1 kit 0     blood glucose monitoring (NO BRAND SPECIFIED) meter device kit Use to test blood sugar 2-4 times daily or as directed. 1 kit 0     blood glucose monitoring (NO BRAND SPECIFIED) test strip Use to test blood sugars 2 times daily or as directed 100 strip  3     blood glucose monitoring (ONE TOUCH DELICA) lancets Use to test blood sugars 2 times daily or as directed. 100 each 3     clotrimazole-betamethasone (LOTRISONE) 1-0.05 % external cream Apply topically 2 times daily 15 g 3     Continuous Blood Gluc  (DEXCOM G5  KIT) ANTONIO Use to read blood sugars as per 's instructions. 1 each 0     diclofenac (VOLTAREN) 1 % topical gel Apply 2 g topically 4 times daily 100 g 3     fluorometholone (FML LIQUIFILM) 0.1 % ophthalmic suspension Place 1 drop into the right eye 3 times daily 1 Bottle 0     fluorometholone (FML LIQUIFILM) 0.1 % ophthalmic suspension Place 1 drop Into the left eye 2 times daily 1 Bottle 0     gabapentin (NEURONTIN) 100 MG capsule Take 1 capsule (100 mg) by mouth 3 times daily as needed for other (nerve pain) 90 capsule 0     GINSENG PO Take by mouth daily       losartan (COZAAR) 25 MG tablet Take 1 tablet (25 mg) by mouth daily 90 tablet 3     metFORMIN (GLUCOPHAGE-XR) 500 MG 24 hr tablet TAKE 1 TABLET BY MOUTH TWICE A DAY WITH MEALS 180 tablet 3     metoprolol succinate (TOPROL XL) 25 MG 24 hr tablet Take 1 tablet (25 mg) by mouth daily 90 tablet 2     Multiple Vitamin (MULTIVITAMINS PO) Take 1 tablet by mouth daily.       nicotine (NICORETTE) 2 MG gum Place 1 each (2 mg) inside cheek as needed for smoking cessation *CHEW APPROX. 20 PCS PER DAY AS DIRECTED For additional refills, please schedule a follow-up appointment with Dr Harris for October 2020 at [phone] each 2     olopatadine (PATANOL) 0.1 % ophthalmic solution Place 1 drop into both eyes 2 times daily 1 Bottle 11     Omega-3 Fatty Acids (FISH OIL PO) Take 1 g by mouth daily        psyllium (METAMUCIL) 58.6 % POWD Take 2 teaspoonful by mouth daily       sildenafil (VIAGRA) 100 MG tablet Take 0.5-1 tablets ( mg) by mouth daily as needed (ED) For additional refills, please schedule a follow-up appointment with Dr Harris for October 2020 at  [phone] 8 tablet 2     trimethoprim-polymyxin b (POLYTRIM) 89136-2.1 UNIT/ML-% ophthalmic solution Place 1-2 drops Into the left eye every 6 hours 10 mL 0      Allergies   Allergen Reactions     Seasonal Allergies       ROS:   Gen- no fevers/chills   Rheum - no morning stiffness   Derm - no rash/ redness   Neuro - no numbness, no tingling   Remainder of ROS negative.     Exam:     Bilateral Knee:   ROM: 0-130; Crepitus: YES   Effusion: NO ; Swelling: NO   Strength: Full in flexion/ extension   Tenderness: Patella - NO Medial joint line - YES - R>L; Lateral joint line - NO; Quad tendon - NO; Patellar tendon- NO; Hamstring - NO.   Patella: patellar compression - neg   Meniscus: Joe - neg      The pt was verbally consented. The R knee was sterilely prepped in usual fashion. Local anesthesia was achieved with 3-5 cc of 1% lidocaine. 3cc of Gel-One was injected via lateral approach without complication. Pt tolerated procedure well    The pt was verbally consented. The L knee was sterilely prepped in usual fashion. Local anesthesia was achieved with 3-5 cc of 1% lidocaine. 3cc of Gel-One was injected via lateral approach without complication. Pt tolerated procedure well    Large Joint Injection/Arthocentesis: bilateral knee    Date/Time: 11/17/2020 1:57 PM  Performed by: Anton Ha MD  Authorized by: Anton Ha MD     Indications:  Osteoarthritis  Needle Size:  22 G  Approach:  Anterolateral  Location:  Knee  Laterality:  Bilateral      Medications (Right):  30 mg cross-Linked Hyaluronate 30 MG/3ML; 2 mL lidocaine (PF) 1 %  Medications (Left):  30 mg cross-Linked Hyaluronate 30 MG/3ML; 2 mL lidocaine (PF) 1 %  Procedure discussed: discussed risks, benefits, and alternatives    Consent Given by:  Patient  Timeout: timeout called immediately prior to procedure    Prep: patient was prepped and draped in usual sterile fashion     Scribed by Carlos Eduardo De ATC, ATC for  on 11/17/20 at 2 PM, based on  providers statements to me.        (M17.0) Primary osteoarthritis of both knees  (primary encounter diagnosis)  Comment: bilateral gel-one injections; f/u in 6 months  Plan: Large Joint Injection/Arthocentesis: bilateral knee         (M48.062) Lumbar stenosis with neurogenic claudication  Comment:   Plan: brief review of imaging and tx plan per pt request while son was on the phone; will hold on injection and cont meds and PT; cont to follow w/ Dr Allen of spine surgery      Anton Ha MD  November 17, 2020  2:18 PM

## 2020-11-18 ENCOUNTER — THERAPY VISIT (OUTPATIENT)
Dept: PHYSICAL THERAPY | Facility: CLINIC | Age: 65
End: 2020-11-18
Payer: COMMERCIAL

## 2020-11-18 DIAGNOSIS — G89.29 CHRONIC BILATERAL LOW BACK PAIN WITH RIGHT-SIDED SCIATICA: Primary | ICD-10-CM

## 2020-11-18 DIAGNOSIS — M25.562 ACUTE PAIN OF BOTH KNEES: ICD-10-CM

## 2020-11-18 DIAGNOSIS — M25.561 ACUTE PAIN OF BOTH KNEES: ICD-10-CM

## 2020-11-18 DIAGNOSIS — M54.41 CHRONIC BILATERAL LOW BACK PAIN WITH RIGHT-SIDED SCIATICA: Primary | ICD-10-CM

## 2020-11-18 PROCEDURE — 97110 THERAPEUTIC EXERCISES: CPT | Mod: GP | Performed by: PHYSICAL THERAPIST

## 2020-11-18 PROCEDURE — 97161 PT EVAL LOW COMPLEX 20 MIN: CPT | Mod: GP | Performed by: PHYSICAL THERAPIST

## 2020-11-18 NOTE — PROGRESS NOTES
[facility] Initial Evaluation  Subjective:  Sujata Valencia is a 64 year old male.    Patient s chief complaints: LBP R buttock, thigh to knee B knee medial joint line pain.    Condition occurred due to no specific cause, perhaps related to raking/yardwork.  Date of Onset: 9/1/20  Location of symptoms is LBP R buttock, thigh to knee, some numbness in R>L calf .  Symptoms other than pain include: numbness/tingling.  Denies weakness   Quality of pain is aching and frequency is constant dull/ache.    Pain dependence on time of day is: worse in morning for low back--initially arising out of bed for back, knees worse during sleep at night when knee on knee.   Pain rating is: back 0-8/10, knees 0-5/10.    Symptoms are exacerbated by: Low back: standing, lifting, walking.  Knees with difficulty sleeping when knees are together.    Symptoms are relieved by:  Bending, sitting, lying down for the back.  Knees position change or pillow between knees.    Progression of symptoms is that symptoms are:  Worse last few months.  Imaging/Special tests include: x-ray and MRI:  Impression:   1. Multilevel lumbar spondylosis most prominent at L4-L5 with severe  spinal canal stenosis. Additionally, there is moderate left and mild  to moderate right neural foraminal stenosis at this level.  2. Moderate spinal canal stenosis and moderate left and mild to  moderate right neural foraminal stenosis at L3-4.  3. Disc bulge at L5-S1 abuts and likely impinges upon the traversing  right S1 nerve root.     X-ray of B knees shows medial joint space narrowing.    Previous treatments include: PT in past for both with some benefit.  Recent B knee injections..   Patient reports that general health is: good.   Pertinent medical history includes:  Diabetes, numbness/tingling, OA.    Medical allergies includes: seasonal.   Surgical history includes: fistula.  Current medications include: NA  Current occupation is: retired  Work  status is: retired  Primary job tasks include: none  Barriers include: none  Red flags include: none    Patient's expectations for therapy include: wants to focus on back first and then get into knees a bit after.    Objective:  LUMBAR:    Posture: fair  Posture Correction: no effect  Relevant Lateral Shift: no    Neurological:    Motor Deficit:  Myotomes L R   L1-2 (hip flexion) 5 5   L3 (knee extension) 5 5   L4 (ankle DF) 5 5   L5 (g. toe ext) 5 5   S1 (ankle PF or knee flex) 5 5     Sensory Deficit, Reflexes: intact light touch screen in sitting (notes numbness with time standing in B calves)    Dural Signs:   L R   Slump negative negative   SLR negatve negative       AROM: (Major, Moderate, Minimal or Nil loss)  Movement Loss Dennis Mod Min Nil Pain   Flexion   X  To distal 1/3 of tibia, just stretch   Extension  X   No effect   Side Gliding L    X Pain R LB   Side Gliding R    X No effect     Repeated movement testing:   (During: produces, abolishes, increases, decreases, no effect, centralizing, peripheralizing; After: better, worse, no better, no worse, no effect, centralized, peripheralized)    Pre-test Symptoms Standing: R LBP   Symptoms During Symptoms After ROM increased ROM decreased No Effect   FIS        Rep FIS No pain going down, some arising at top in R LB to start, then decreases No worse   X   EIS        Rep EIS No effect No effect   X   Pre-test Symptoms Lying: R LB/buttock pain    Symptoms During Symptoms After ROM increased ROM decreased No Effect   ASHLEY        Rep ASHLEY Sustained x 30 sec with relief better   X   EIL        Rep EIL Not tested         Static Tests: flexion/rotation in R sidelying: no effect; flexion/rotation in L sidelying: no effect; HENRIQUE:   Other Tests: manual traction long axis through R hip relieving    Provisional Classification: other, stenosis  Principle of Management: will initiate lumbar flexion as well as manual traction.  Will advance core strength as symptoms  improve.    KNEE:    PROM:   L  R   Hyperextension     Extension -1 -1   Flexion 133 133     Strength:   L R   HIP     Flex 5 5   Ext     Abd     KNEE     Flex 5 5   Ext 5 5     Special tests:   L R   Anterior Drawer negative negative   Posterior Drawer negative negative   Lachman's negative negative   Valgus 0 degrees negative negative   Valgus 30 degrees negative negative   Varus 0 degrees negative negative   Varus 30 degrees negative negative   Joe's negative negative   Appley's     Lateral Compression     Patellar Compression         Palpation: tender medial joint line B knees      System    Physical Exam    General     ROS    Assessment/Plan:    Patient is a 64 year old male with lumbar and both sides knee complaints.    Patient has the following significant findings with corresponding treatment plan.                Diagnosis 1:  LBP with recent R sided radicular symptoms, likely overall stenosis    Pain -  hot/cold therapy, manual therapy and directional preference exercise  Decreased ROM/flexibility - manual therapy and therapeutic exercise  Decreased strength - therapeutic exercise and therapeutic activities  Decreased proprioception - neuro re-education and therapeutic activities  Decreased function - therapeutic activities  Impaired posture - neuro re-education  Diagnosis 2:  B medial knee joint line pain     Pain -  hot/cold therapy and directional preference exercise  Decreased ROM/flexibility - manual therapy and therapeutic exercise  Decreased strength - therapeutic exercise and therapeutic activities  Decreased proprioception - neuro re-education and therapeutic activities  Decreased function - therapeutic activities    Therapy Evaluation Codes:   1) History comprised of:   Personal factors that impact the plan of care:      None.    Comorbidity factors that impact the plan of care are:      None.     Medications impacting care: None.  2) Examination of Body Systems comprised of:   Body  structures and functions that impact the plan of care:      Knee and Lumbar spine.   Activity limitations that impact the plan of care are:      Lifting, Standing, Walking and Sleeping.  3) Clinical presentation characteristics are:   Stable/Uncomplicated.  4) Decision-Making    Low complexity using standardized patient assessment instrument and/or measureable assessment of functional outcome.  Cumulative Therapy Evaluation is: Low complexity.    Previous and current functional limitations:  (See Goal Flow Sheet for this information)    Short term and Long term goals: (See Goal Flow Sheet for this information)     Communication ability:  Patient appears to be able to clearly communicate and understand verbal and written communication and follow directions correctly.  Treatment Explanation - The following has been discussed with the patient:   RX ordered/plan of care  Anticipated outcomes  Possible risks and side effects  This patient would benefit from PT intervention to resume normal activities.   Rehab potential is good.    Frequency:  1 X week, once daily  Duration:  for 8 weeks  Discharge Plan:  Achieve all LTG.  Independent in home treatment program.  Reach maximal therapeutic benefit.    Please refer to the daily flowsheet for treatment today, total treatment time and time spent performing 1:1 timed codes.

## 2020-11-25 ENCOUNTER — THERAPY VISIT (OUTPATIENT)
Dept: PHYSICAL THERAPY | Facility: CLINIC | Age: 65
End: 2020-11-25
Payer: COMMERCIAL

## 2020-11-25 DIAGNOSIS — M25.562 ACUTE PAIN OF BOTH KNEES: ICD-10-CM

## 2020-11-25 DIAGNOSIS — M25.561 ACUTE PAIN OF BOTH KNEES: ICD-10-CM

## 2020-11-25 DIAGNOSIS — G89.29 CHRONIC BILATERAL LOW BACK PAIN WITH RIGHT-SIDED SCIATICA: Primary | ICD-10-CM

## 2020-11-25 DIAGNOSIS — M54.41 CHRONIC BILATERAL LOW BACK PAIN WITH RIGHT-SIDED SCIATICA: Primary | ICD-10-CM

## 2020-11-25 PROCEDURE — 97140 MANUAL THERAPY 1/> REGIONS: CPT | Mod: GP | Performed by: PHYSICAL THERAPIST

## 2020-11-25 PROCEDURE — 97110 THERAPEUTIC EXERCISES: CPT | Mod: GP | Performed by: PHYSICAL THERAPIST

## 2020-12-03 ENCOUNTER — THERAPY VISIT (OUTPATIENT)
Dept: PHYSICAL THERAPY | Facility: CLINIC | Age: 65
End: 2020-12-03
Payer: COMMERCIAL

## 2020-12-03 DIAGNOSIS — M54.41 CHRONIC BILATERAL LOW BACK PAIN WITH RIGHT-SIDED SCIATICA: Primary | ICD-10-CM

## 2020-12-03 DIAGNOSIS — M25.561 ACUTE PAIN OF BOTH KNEES: ICD-10-CM

## 2020-12-03 DIAGNOSIS — M25.562 ACUTE PAIN OF BOTH KNEES: ICD-10-CM

## 2020-12-03 DIAGNOSIS — G89.29 CHRONIC BILATERAL LOW BACK PAIN WITH RIGHT-SIDED SCIATICA: Primary | ICD-10-CM

## 2020-12-03 PROCEDURE — 97140 MANUAL THERAPY 1/> REGIONS: CPT | Mod: GP | Performed by: PHYSICAL THERAPIST

## 2020-12-03 PROCEDURE — 97110 THERAPEUTIC EXERCISES: CPT | Mod: GP | Performed by: PHYSICAL THERAPIST

## 2020-12-08 DIAGNOSIS — L98.9 SKIN LESION: ICD-10-CM

## 2020-12-08 DIAGNOSIS — I10 BENIGN ESSENTIAL HYPERTENSION: ICD-10-CM

## 2020-12-08 DIAGNOSIS — Z12.11 SCREEN FOR COLON CANCER: ICD-10-CM

## 2020-12-08 DIAGNOSIS — N52.01 ERECTILE DYSFUNCTION DUE TO ARTERIAL INSUFFICIENCY: ICD-10-CM

## 2020-12-08 DIAGNOSIS — Z12.5 SCREENING FOR PROSTATE CANCER: ICD-10-CM

## 2020-12-08 DIAGNOSIS — F17.200 SMOKING: ICD-10-CM

## 2020-12-08 DIAGNOSIS — N40.0 BENIGN NODULAR PROSTATIC HYPERPLASIA WITHOUT LOWER URINARY TRACT SYMPTOMS: ICD-10-CM

## 2020-12-10 ENCOUNTER — THERAPY VISIT (OUTPATIENT)
Dept: PHYSICAL THERAPY | Facility: CLINIC | Age: 65
End: 2020-12-10
Payer: COMMERCIAL

## 2020-12-10 DIAGNOSIS — M25.561 ACUTE PAIN OF BOTH KNEES: ICD-10-CM

## 2020-12-10 DIAGNOSIS — M54.41 CHRONIC BILATERAL LOW BACK PAIN WITH RIGHT-SIDED SCIATICA: Primary | ICD-10-CM

## 2020-12-10 DIAGNOSIS — G89.29 CHRONIC BILATERAL LOW BACK PAIN WITH RIGHT-SIDED SCIATICA: Primary | ICD-10-CM

## 2020-12-10 DIAGNOSIS — M25.562 ACUTE PAIN OF BOTH KNEES: ICD-10-CM

## 2020-12-10 PROCEDURE — 97140 MANUAL THERAPY 1/> REGIONS: CPT | Mod: GP | Performed by: PHYSICAL THERAPIST

## 2020-12-10 PROCEDURE — 97110 THERAPEUTIC EXERCISES: CPT | Mod: GP | Performed by: PHYSICAL THERAPIST

## 2020-12-10 NOTE — TELEPHONE ENCOUNTER
NICOTINE 2 MG CHEWING GUM      Last Written Prescription Date:  7/15/20  Last Fill Quantity: 880 each,   # refills: 2  Last Office Visit : 11/17/20  Future Office visit:  None scheduled    Routing refill request to provider for review/approval because:  Blood pressure out of range   BP Readings from Last 3 Encounters:   11/17/20 (!) 149/80   09/29/20 127/82   10/25/19 124/84

## 2020-12-11 ENCOUNTER — MYC MEDICAL ADVICE (OUTPATIENT)
Dept: INTERNAL MEDICINE | Facility: CLINIC | Age: 65
End: 2020-12-11

## 2020-12-13 DIAGNOSIS — G89.29 CHRONIC BILATERAL LOW BACK PAIN WITH RIGHT-SIDED SCIATICA: ICD-10-CM

## 2020-12-13 DIAGNOSIS — M54.41 CHRONIC BILATERAL LOW BACK PAIN WITH RIGHT-SIDED SCIATICA: ICD-10-CM

## 2020-12-14 RX ORDER — GABAPENTIN 100 MG/1
100 CAPSULE ORAL 3 TIMES DAILY PRN
Qty: 90 CAPSULE | Refills: 0 | Status: SHIPPED | OUTPATIENT
Start: 2020-12-14

## 2020-12-15 ASSESSMENT — ENCOUNTER SYMPTOMS
EYE WATERING: 0
EYE PAIN: 0
EYE REDNESS: 1
EYE IRRITATION: 0
DOUBLE VISION: 0

## 2020-12-17 ENCOUNTER — THERAPY VISIT (OUTPATIENT)
Dept: PHYSICAL THERAPY | Facility: CLINIC | Age: 65
End: 2020-12-17
Payer: COMMERCIAL

## 2020-12-17 DIAGNOSIS — M54.41 CHRONIC BILATERAL LOW BACK PAIN WITH RIGHT-SIDED SCIATICA: Primary | ICD-10-CM

## 2020-12-17 DIAGNOSIS — M25.561 ACUTE PAIN OF BOTH KNEES: ICD-10-CM

## 2020-12-17 DIAGNOSIS — M25.562 ACUTE PAIN OF BOTH KNEES: ICD-10-CM

## 2020-12-17 DIAGNOSIS — G89.29 CHRONIC BILATERAL LOW BACK PAIN WITH RIGHT-SIDED SCIATICA: Primary | ICD-10-CM

## 2020-12-17 PROCEDURE — 97140 MANUAL THERAPY 1/> REGIONS: CPT | Mod: GP | Performed by: PHYSICAL THERAPIST

## 2020-12-17 PROCEDURE — 97110 THERAPEUTIC EXERCISES: CPT | Mod: GP | Performed by: PHYSICAL THERAPIST

## 2020-12-17 NOTE — LETTER
[facility]  [id number]  [place] [address]  [phone]    2020    Re: Sujata Valencia   :   1955  MRN:  [id number]   REFERRING PHYSICIAN:   Eliot Gatica    [facility]    Date of Initial Evaluation:  20  Visits:  Rxs Used: 5  Reason for Referral:     Chronic bilateral low back pain with right-sided sciatica  Acute pain of both knees    EVALUATION SUMMARY    PROGRESS  REPORT    Progress reporting period is from 20 to 20.       SUBJECTIVE  Subjective changes noted by patient:  Notes overall improvement.  Low back pain with intermittent pain up to 3/10.  Pain is LB and then R thigh above knee predominantly now.  Numbness is improved.  B knees haven't been bothering much lately.     Current Pain level: 3/10.     Initial Pain level: 8/10.   Changes in function:  Yes (See Goal flowsheet attached for changes in current functional level)  Adverse reaction to treatment or activity: None    OBJECTIVE  Changes noted in objective findings:  Yes,   Objective:     Lumbar AROM: flexion to distal 1/3 of tibia.  Extension mod loss.  Pain with initiating motion, but then feels okay with both flex and ext.  B SG is without pain.      Is doing flexion based exercises for low back (stenosis), manual long axis traction.  Also, working on hamstring stretching, light core strengthening, soft tissue work in the back and R glute/piriformis area.      Doing 3-4 knee exercises.  Overall, showing improvement with exercises and decreased symptoms.     ASSESSMENT/PLAN  Updated problem list and treatment plan: Diagnosis 1:  LBP, R radiculopathy (stenosis overall most likely, does have disc on MRI as well, but testing consistent with stenosis; B knee medial joint line pain)    Pain -  hot/cold therapy, manual therapy and directional preference exercise  Decreased ROM/flexibility -  manual therapy and therapeutic exercise  Decreased strength - therapeutic exercise and therapeutic activities  Decreased proprioception - neuro re-education and therapeutic activities  Decreased function - therapeutic activities  Impaired posture - neuro re-education  STG/LTGs have been met or progress has been made towards goals:  Yes (See Goal flow sheet completed today.)  Assessment of Progress: The patient's condition is improving.  Self Management Plans:  Patient has been instructed in a home treatment program.  I have re-evaluated this patient and find that the nature, scope, duration and intensity of the therapy is appropriate for the medical condition of the patient.  Sujata continues to require the following intervention to meet STG and LTG's:  PT    Recommendations:  This patient would benefit from continued therapy.     Frequency:  1 X week, once daily  Duration:  for 4 weeks    Thank you for your referral.    INQUIRIES  Therapist: Jean Florence, PT   [facility]  [address] [place], [place] [id number]  [facility] [address]  Phone: [phone]  Fax: [phone]

## 2020-12-18 ENCOUNTER — OFFICE VISIT (OUTPATIENT)
Dept: ORTHOPEDICS | Facility: CLINIC | Age: 65
End: 2020-12-18
Payer: COMMERCIAL

## 2020-12-18 DIAGNOSIS — G89.29 CHRONIC BILATERAL LOW BACK PAIN WITH RIGHT-SIDED SCIATICA: Primary | ICD-10-CM

## 2020-12-18 DIAGNOSIS — M54.41 CHRONIC BILATERAL LOW BACK PAIN WITH RIGHT-SIDED SCIATICA: Primary | ICD-10-CM

## 2020-12-18 PROCEDURE — 99213 OFFICE O/P EST LOW 20 MIN: CPT | Performed by: ORTHOPAEDIC SURGERY

## 2020-12-18 NOTE — PROGRESS NOTES
PROGRESS  REPORT    Progress reporting period is from 11/28/20 to 12/17/20.       SUBJECTIVE  Subjective changes noted by patient:  Notes overall improvement.  Low back pain with intermittent pain up to 3/10.  Pain is LB and then R thigh above knee predominantly now.  Numbness is improved.  B knees haven't been bothering much lately.     Current Pain level: 3/10.     Initial Pain level: 8/10.   Changes in function:  Yes (See Goal flowsheet attached for changes in current functional level)  Adverse reaction to treatment or activity: None    OBJECTIVE  Changes noted in objective findings:  Yes,   Objective:     Lumbar AROM: flexion to distal 1/3 of tibia.  Extension mod loss.  Pain with initiating motion, but then feels okay with both flex and ext.  B SG is without pain.      Is doing flexion based exercises for low back (stenosis), manual long axis traction.  Also, working on hamstring stretching, light core strengthening, soft tissue work in the back and R glute/piriformis area.      Doing 3-4 knee exercises.  Overall, showing improvement with exercises and decreased symptoms.     ASSESSMENT/PLAN  Updated problem list and treatment plan: Diagnosis 1:  LBP, R radiculopathy (stenosis overall most likely, does have disc on MRI as well, but testing consistent with stenosis; B knee medial joint line pain)    Pain -  hot/cold therapy, manual therapy and directional preference exercise  Decreased ROM/flexibility - manual therapy and therapeutic exercise  Decreased strength - therapeutic exercise and therapeutic activities  Decreased proprioception - neuro re-education and therapeutic activities  Decreased function - therapeutic activities  Impaired posture - neuro re-education  STG/LTGs have been met or progress has been made towards goals:  Yes (See Goal flow sheet completed today.)  Assessment of Progress: The patient's condition is improving.  Self Management Plans:  Patient has been instructed in a home treatment  program.  I have re-evaluated this patient and find that the nature, scope, duration and intensity of the therapy is appropriate for the medical condition of the patient.  Sujata continues to require the following intervention to meet STG and LTG's:  PT    Recommendations:  This patient would benefit from continued therapy.     Frequency:  1 X week, once daily  Duration:  for 4 weeks        Please refer to the daily flowsheet for treatment today, total treatment time and time spent performing 1:1 timed codes.

## 2020-12-18 NOTE — LETTER
12/18/2020         RE: Sujata Valencia  [address] [place] [address]        Dear Colleague,    Thank you for referring your patient, Sujata Valencia, to the [facility] [place]. Please see a copy of my visit note below.    Spine Surgery Return Clinic Visit      Chief Complaint:   No chief complaint on file.      Interval HPI:  Symptom Profile Including: location of symptoms, onset, severity, exacerbating/alleviating factors, previous treatments:        Sujata Valencia is a 65 year old male who I saw 6 weeks ago for claudicatory and radicular leg complaints.  At that time I prescribed physical therapy gabapentin and an epidural steroid injection.    He returns today noting that he got started with physical therapy and has been taking gabapentin 100 mg 3 times a day and this is helped about 50% of his pain symptoms.  He is very encouraged by this progress.  He also notes that he is not willing to live with this remaining pain.  Although he is better, it is still significantly interfering with his quality of life and he is not able to engage in sexual activity for example.    He says if he did want surgery he would want to wait until after he had been vaccinated for coronavirus.            Past Medical History:     Past Medical History:   Diagnosis Date     Arthritis      Back pains, lower      Chest pain 7/28/2014     Hypertension      Myocarditis (H)      Other abnormal glucose 7/21/2011     Other and unspecified hyperlipidemia 7/21/2011     Subclinical hypothyroidism             Past Surgical History:     Past Surgical History:   Procedure Laterality Date     COLONOSCOPY  1998    fistula      CORONARY ANGIOGRAPHY ADULT ORDER  07/29/2014    normal coronary arteries            Social History:     Social History     Tobacco Use     Smoking status: Former Smoker     Packs/day: 1.00     Years: 25.00     Pack years: 25.00     Start date: 1975     Quit date: 10/31/2001      Years since quittin.1     Smokeless tobacco: Never Used   Substance Use Topics     Alcohol use: Yes     Comment: scotch 2 times a week, wine 5 days per week at drs request            Family History:     Family History   Problem Relation Age of Onset     Osteoporosis Mother      Diabetes Maternal Grandmother      Glaucoma No family hx of      Macular Degeneration No family hx of             Allergies:     Allergies   Allergen Reactions     Seasonal Allergies             Medications:     Current Outpatient Medications   Medication     aspirin 81 MG tablet     atorvastatin (LIPITOR) 10 MG tablet     blood glucose (NO BRAND SPECIFIED) lancets standard     blood glucose (NO BRAND SPECIFIED) test strip     blood glucose (NO BRAND SPECIFIED) test strip     blood glucose monitoring (NO BRAND SPECIFIED) meter device kit     blood glucose monitoring (NO BRAND SPECIFIED) meter device kit     blood glucose monitoring (NO BRAND SPECIFIED) test strip     blood glucose monitoring (ONE TOUCH DELICA) lancets     clotrimazole-betamethasone (LOTRISONE) 1-0.05 % external cream     Continuous Blood Gluc  (DEXCOM G5  KIT) ANTONIO     diclofenac (VOLTAREN) 1 % topical gel     fluorometholone (FML LIQUIFILM) 0.1 % ophthalmic suspension     fluorometholone (FML LIQUIFILM) 0.1 % ophthalmic suspension     gabapentin (NEURONTIN) 100 MG capsule     GINSENG PO     metFORMIN (GLUCOPHAGE-XR) 500 MG 24 hr tablet     Multiple Vitamin (MULTIVITAMINS PO)     nicotine (NICORETTE) 2 MG gum     olopatadine (PATANOL) 0.1 % ophthalmic solution     Omega-3 Fatty Acids (FISH OIL PO)     psyllium (METAMUCIL) 58.6 % POWD     sildenafil (VIAGRA) 100 MG tablet     trimethoprim-polymyxin b (POLYTRIM) 56942-4.1 UNIT/ML-% ophthalmic solution     Current Facility-Administered Medications   Medication     cross-Linked Hyaluronate (GEL-ONE) injection PRSY 30 mg     cross-Linked Hyaluronate (GEL-ONE) injection PRSY 30 mg     cross-Linked Hyaluronate  (GEL-ONE) injection PRSY 30 mg     lidocaine (PF) (XYLOCAINE) 1 % injection 2 mL     lidocaine (PF) (XYLOCAINE) 1 % injection 2 mL     lidocaine (PF) (XYLOCAINE) 1 % injection 5 mL     triamcinolone (KENALOG-40) injection 40 mg             Review of Systems:   A focused musculoskeletal and neurologic ROS was performed with pertinent positives and negatives noted in the HPI.  Additional systems were also reviewed and are documented at the bottom of the note.         Physical Exam:   Vitals: There were no vitals taken for this visit.  Musculoskeletal, Neurologic, and Spine:            Lumbar Spine:    Appearance - No gross stepoffs or deformities    Motor -     L2-3: Hip flexion 5/5 R and 5/5 L strength          L3/4:  Knee extension R 5/5 and L 5/5 strength         L4/5:  Foot dorsiflexion R 5/5 L 5/5 and       EHL dorsiflexion R 4/5 L 4/5 strength         S1:  Plantarflexion/Peroneal Muscles  R 5/5 and L 5/5 strength    Sensation: intact to light touch L3-S1 distribution BLE      Neurologic:      REFLEXES Left Right   Biceps 1+ 1+   Triceps 1+ 1+   Brachioradialis 1+ 1+   Patella 1+ 1+   Ankle jerk 1+ 1+   Babinski No upgoing great toe No upgoing great toe   Clonus 0 beats 0 beats     Hip Exam:  No pain with hip log roll and no tenderness over the greater trochanters.    Alignment:  Patient stands with a neutral standing sagittal balance.           Imaging:   We ordered and independently reviewed new radiographs at this clinic visit. The results were discussed with the patient. Findings include:     I again reviewed his lumbar x-ray and MRI, which show severe degenerative changes and central stenosis L3-5 with right L5-S1 lateral recess stenosis       Assessment and Plan:     65 year old male with claudicatory and radicular leg complaints with some partial improvement with gabapentin and physical therapy, but continued significant symptoms.    We again discussed options which if he were to pursue surgery I would  recommend a lumbar decompression.    Given his improvement with nonoperative care he wants to give this a little more time.  He is going to increase his gabapentin to 300 mg dose and I agreed to provide a new prescription for this.  He is going to continue with physical therapy and also complete the epidural injection which is not yet quite done.  If he did have surgery he would want to wait until after the coronavirus vaccine was available so I will follow-up with him in 3 months after he has had a chance to get the vaccine.           Respectfully,  Eliot Allen MD  Spine Surgery  [facility]    Answers for HPI/ROS submitted by the patient on 12/15/2020   General Symptoms: No  Skin Symptoms: No  HENT Symptoms: No  EYE SYMPTOMS: Yes  HEART SYMPTOMS: No  LUNG SYMPTOMS: No  INTESTINAL SYMPTOMS: No  URINARY SYMPTOMS: No  REPRODUCTIVE SYMPTOMS: No  SKELETAL SYMPTOMS: No  BLOOD SYMPTOMS: No  NERVOUS SYSTEM SYMPTOMS: No  MENTAL HEALTH SYMPTOMS: No  Eye pain: No  Vision loss: No  Dry eyes: Yes  Watery eyes: No  Eye bulging: No  Double vision: No  Flashing of lights: No  Spots: No  Floaters: Yes  Redness: Yes  Crossed eyes: No  Tunnel Vision: No  Yellowing of eyes: No  Eye irritation: No

## 2020-12-18 NOTE — PROGRESS NOTES
Spine Surgery Return Clinic Visit      Chief Complaint:   No chief complaint on file.      Interval HPI:  Symptom Profile Including: location of symptoms, onset, severity, exacerbating/alleviating factors, previous treatments:        Sujata Valencia is a 65 year old male who I saw 6 weeks ago for claudicatory and radicular leg complaints.  At that time I prescribed physical therapy gabapentin and an epidural steroid injection.    He returns today noting that he got started with physical therapy and has been taking gabapentin 100 mg 3 times a day and this is helped about 50% of his pain symptoms.  He is very encouraged by this progress.  He also notes that he is not willing to live with this remaining pain.  Although he is better, it is still significantly interfering with his quality of life and he is not able to engage in sexual activity for example.    He says if he did want surgery he would want to wait until after he had been vaccinated for coronavirus.            Past Medical History:     Past Medical History:   Diagnosis Date     Arthritis      Back pains, lower      Chest pain 2014     Hypertension      Myocarditis (H)      Other abnormal glucose 2011     Other and unspecified hyperlipidemia 2011     Subclinical hypothyroidism             Past Surgical History:     Past Surgical History:   Procedure Laterality Date     COLONOSCOPY      fistula      CORONARY ANGIOGRAPHY ADULT ORDER  2014    normal coronary arteries            Social History:     Social History     Tobacco Use     Smoking status: Former Smoker     Packs/day: 1.00     Years: 25.00     Pack years: 25.00     Start date:      Quit date: 10/31/2001     Years since quittin.1     Smokeless tobacco: Never Used   Substance Use Topics     Alcohol use: Yes     Comment: scotch 2 times a week, wine 5 days per week at drs request            Family History:     Family History   Problem Relation Age of Onset      Osteoporosis Mother      Diabetes Maternal Grandmother      Glaucoma No family hx of      Macular Degeneration No family hx of             Allergies:     Allergies   Allergen Reactions     Seasonal Allergies             Medications:     Current Outpatient Medications   Medication     aspirin 81 MG tablet     atorvastatin (LIPITOR) 10 MG tablet     blood glucose (NO BRAND SPECIFIED) lancets standard     blood glucose (NO BRAND SPECIFIED) test strip     blood glucose (NO BRAND SPECIFIED) test strip     blood glucose monitoring (NO BRAND SPECIFIED) meter device kit     blood glucose monitoring (NO BRAND SPECIFIED) meter device kit     blood glucose monitoring (NO BRAND SPECIFIED) test strip     blood glucose monitoring (ONE TOUCH DELICA) lancets     clotrimazole-betamethasone (LOTRISONE) 1-0.05 % external cream     Continuous Blood Gluc  (DEXCOM G5  KIT) ANTONIO     diclofenac (VOLTAREN) 1 % topical gel     fluorometholone (FML LIQUIFILM) 0.1 % ophthalmic suspension     fluorometholone (FML LIQUIFILM) 0.1 % ophthalmic suspension     gabapentin (NEURONTIN) 100 MG capsule     GINSENG PO     metFORMIN (GLUCOPHAGE-XR) 500 MG 24 hr tablet     Multiple Vitamin (MULTIVITAMINS PO)     nicotine (NICORETTE) 2 MG gum     olopatadine (PATANOL) 0.1 % ophthalmic solution     Omega-3 Fatty Acids (FISH OIL PO)     psyllium (METAMUCIL) 58.6 % POWD     sildenafil (VIAGRA) 100 MG tablet     trimethoprim-polymyxin b (POLYTRIM) 47184-4.1 UNIT/ML-% ophthalmic solution     Current Facility-Administered Medications   Medication     cross-Linked Hyaluronate (GEL-ONE) injection PRSY 30 mg     cross-Linked Hyaluronate (GEL-ONE) injection PRSY 30 mg     cross-Linked Hyaluronate (GEL-ONE) injection PRSY 30 mg     lidocaine (PF) (XYLOCAINE) 1 % injection 2 mL     lidocaine (PF) (XYLOCAINE) 1 % injection 2 mL     lidocaine (PF) (XYLOCAINE) 1 % injection 5 mL     triamcinolone (KENALOG-40) injection 40 mg             Review of Systems:    A focused musculoskeletal and neurologic ROS was performed with pertinent positives and negatives noted in the HPI.  Additional systems were also reviewed and are documented at the bottom of the note.         Physical Exam:   Vitals: There were no vitals taken for this visit.  Musculoskeletal, Neurologic, and Spine:            Lumbar Spine:    Appearance - No gross stepoffs or deformities    Motor -     L2-3: Hip flexion 5/5 R and 5/5 L strength          L3/4:  Knee extension R 5/5 and L 5/5 strength         L4/5:  Foot dorsiflexion R 5/5 L 5/5 and       EHL dorsiflexion R 4/5 L 4/5 strength         S1:  Plantarflexion/Peroneal Muscles  R 5/5 and L 5/5 strength    Sensation: intact to light touch L3-S1 distribution BLE      Neurologic:      REFLEXES Left Right   Biceps 1+ 1+   Triceps 1+ 1+   Brachioradialis 1+ 1+   Patella 1+ 1+   Ankle jerk 1+ 1+   Babinski No upgoing great toe No upgoing great toe   Clonus 0 beats 0 beats     Hip Exam:  No pain with hip log roll and no tenderness over the greater trochanters.    Alignment:  Patient stands with a neutral standing sagittal balance.           Imaging:   We ordered and independently reviewed new radiographs at this clinic visit. The results were discussed with the patient. Findings include:     I again reviewed his lumbar x-ray and MRI, which show severe degenerative changes and central stenosis L3-5 with right L5-S1 lateral recess stenosis       Assessment and Plan:     65 year old male with claudicatory and radicular leg complaints with some partial improvement with gabapentin and physical therapy, but continued significant symptoms.    We again discussed options which if he were to pursue surgery I would recommend a lumbar decompression.    Given his improvement with nonoperative care he wants to give this a little more time.  He is going to increase his gabapentin to 300 mg dose and I agreed to provide a new prescription for this.  He is going to continue with  physical therapy and also complete the epidural injection which is not yet quite done.  If he did have surgery he would want to wait until after the coronavirus vaccine was available so I will follow-up with him in 3 months after he has had a chance to get the vaccine.           Respectfully,  Eliot Allen MD  Spine Surgery  [facility]    Answers for HPI/ROS submitted by the patient on 12/15/2020   General Symptoms: No  Skin Symptoms: No  HENT Symptoms: No  EYE SYMPTOMS: Yes  HEART SYMPTOMS: No  LUNG SYMPTOMS: No  INTESTINAL SYMPTOMS: No  URINARY SYMPTOMS: No  REPRODUCTIVE SYMPTOMS: No  SKELETAL SYMPTOMS: No  BLOOD SYMPTOMS: No  NERVOUS SYSTEM SYMPTOMS: No  MENTAL HEALTH SYMPTOMS: No  Eye pain: No  Vision loss: No  Dry eyes: Yes  Watery eyes: No  Eye bulging: No  Double vision: No  Flashing of lights: No  Spots: No  Floaters: Yes  Redness: Yes  Crossed eyes: No  Tunnel Vision: No  Yellowing of eyes: No  Eye irritation: No

## 2020-12-21 ENCOUNTER — OFFICE VISIT (OUTPATIENT)
Dept: OPHTHALMOLOGY | Facility: CLINIC | Age: 65
End: 2020-12-21
Attending: OPHTHALMOLOGY
Payer: COMMERCIAL

## 2020-12-21 DIAGNOSIS — M54.41 CHRONIC BILATERAL LOW BACK PAIN WITH RIGHT-SIDED SCIATICA: Primary | ICD-10-CM

## 2020-12-21 DIAGNOSIS — H01.02A SQUAMOUS BLEPHARITIS OF UPPER AND LOWER EYELIDS OF BOTH EYES: Primary | ICD-10-CM

## 2020-12-21 DIAGNOSIS — H01.02B SQUAMOUS BLEPHARITIS OF UPPER AND LOWER EYELIDS OF BOTH EYES: Primary | ICD-10-CM

## 2020-12-21 DIAGNOSIS — G89.29 CHRONIC BILATERAL LOW BACK PAIN WITH RIGHT-SIDED SCIATICA: Primary | ICD-10-CM

## 2020-12-21 DIAGNOSIS — H00.15 CHALAZION LEFT LOWER EYELID: ICD-10-CM

## 2020-12-21 DIAGNOSIS — H11.001 PTERYGIUM, RIGHT: ICD-10-CM

## 2020-12-21 PROCEDURE — 99213 OFFICE O/P EST LOW 20 MIN: CPT | Performed by: OPHTHALMOLOGY

## 2020-12-21 PROCEDURE — G0463 HOSPITAL OUTPT CLINIC VISIT: HCPCS

## 2020-12-21 RX ORDER — GABAPENTIN 300 MG/1
300 CAPSULE ORAL 3 TIMES DAILY
Qty: 120 CAPSULE | Refills: 0 | Status: SHIPPED | OUTPATIENT
Start: 2020-12-21 | End: 2021-01-28

## 2020-12-21 RX ORDER — NEOMYCIN SULFATE, POLYMYXIN B SULFATE, AND DEXAMETHASONE 3.5; 10000; 1 MG/G; [USP'U]/G; MG/G
0.5 OINTMENT OPHTHALMIC AT BEDTIME
Qty: 1 TUBE | Refills: 2 | Status: SHIPPED | OUTPATIENT
Start: 2020-12-21 | End: 2021-04-28

## 2020-12-21 ASSESSMENT — VISUAL ACUITY
OD_SC+: +2
METHOD: SNELLEN - LINEAR
OS_SC: 20/20
OD_SC: 20/25

## 2020-12-21 ASSESSMENT — SLIT LAMP EXAM - LIDS: COMMENTS: MGD, LID MARGIN TELANGIECTASIAS

## 2020-12-21 ASSESSMENT — EXTERNAL EXAM - RIGHT EYE: OD_EXAM: NORMAL

## 2020-12-21 ASSESSMENT — CUP TO DISC RATIO
OS_RATIO: 0.2
OD_RATIO: 0.2

## 2020-12-21 ASSESSMENT — CONF VISUAL FIELD: OD_INFERIOR_TEMPORAL_RESTRICTION: 3

## 2020-12-21 ASSESSMENT — TONOMETRY
OD_IOP_MMHG: 16
IOP_METHOD: TONOPEN
OS_IOP_MMHG: 16

## 2020-12-21 ASSESSMENT — EXTERNAL EXAM - LEFT EYE: OS_EXAM: NORMAL

## 2020-12-21 NOTE — PROGRESS NOTES
"HPI  Sujata Valencia is a 65 year old male here for evaluation of recurrence of temporal right eye redness, as well as left eye redness, and a \"pimple\" of his left lower eyelid edge. The redness has been back for the last couple of weeks, the pimple has been there for the last several days. He used warm compresses which did help, but then it came back. He has also been using the FML 3 times a day along with ATs, but it hasn't helped as much as before.      Assessment & Plan       (H01.02A,  H01.02B) Squamous blepharitis of upper and lower eyelids of both eyes  (H00.15) Chalazion left lower eyelid  Comment: With bilateral eye redness.  Plan: Continue warm compresses BID, ATs 3x daily  Add Maxitrol both eyes at bedtime, hold FML for now    (H11.001) Pterygium, right  Comment: With mild elevation. Inflamed, not affecting vision  Plan: Monitor for now.    (H10.13) Allergic conjunctivitis, bilateral  (primary encounter diagnosis)  Comment: Controlled with patanol, but he prefers the BID dosing of 0.1%  Plan: Continue patanol 0.1% BID    (R73.03) Borderline type 2 diabetes mellitus  (primary encounter diagnosis)  Comment: He has been followed for this since 2012.On metformin. Last A1c 6.2 10/2020. No diabetic retinopathy at last DFE (9/2020). Undilated today.  Plan: Discussed the importance of tight blood glucose control in the prevention of diabetic retinopathy. Recommend yearly dilated eye exam.    (H52.4) Presbyopia OU  Comment: Good distance vision without correction  Plan: Ok to continue with OTC readers    -----------------------------------------------------------------------------------    Patient disposition:   Return in about 6 weeks (around 2/1/2021) for ocular surface check. or sooner as needed.    Teaching statement:  Complete documentation of historical and exam elements from today's encounter can be found in the full encounter summary report (not reduplicated in this progress note). I personally obtained " the chief complaint(s) and history of present illness.  I confirmed and edited as necessary the review of systems, past medical/surgical history, family history, social history, and examination findings as documented by others; and I examined the patient myself. I personally reviewed the relevant tests, images, and reports as documented above.     I formulated and edited as necessary the assessment and plan and discussed the findings and management plan with the patient and family.    Maria Teresa Cabrera MD  Comprehensive Ophthalmology & Ocular Pathology  Department of Ophthalmology and Visual Neurosciences  [email or web address]  Pager [phone]

## 2020-12-21 NOTE — NURSING NOTE
Chief Complaints and History of Present Illnesses   Patient presents with     Bump On Left Lower Lid     Chief Complaint(s) and History of Present Illness(es)     Bump On Left Lower Lid     Laterality: left lower lid    Onset: 5 hours ago    Associated symptoms: red eye (both eyes) and lid pain.  Negative for eye pain    Treatments tried: artificial tears    Pain scale: 0/10              Comments     He states that he had a bump on his left lower lid that appeared this morning.  He is also concerned that both eyes look red and about the pterygium in his right eye.    He has been using FML in his right eye and needs a refill if he is to keep using it.    Na Amin, COT 3:21 PM  December 21, 2020

## 2020-12-23 DIAGNOSIS — E78.00 HIGH CHOLESTEROL: ICD-10-CM

## 2020-12-23 LAB
ALBUMIN SERPL-MCNC: 3.9 G/DL (ref 3.4–5)
ALP SERPL-CCNC: 76 U/L (ref 40–150)
ALT SERPL W P-5'-P-CCNC: 41 U/L (ref 0–70)
AST SERPL W P-5'-P-CCNC: 18 U/L (ref 0–45)
BILIRUB DIRECT SERPL-MCNC: 0.2 MG/DL (ref 0–0.2)
BILIRUB SERPL-MCNC: 0.6 MG/DL (ref 0.2–1.3)
CHOLEST SERPL-MCNC: 159 MG/DL
HDLC SERPL-MCNC: 60 MG/DL
LDLC SERPL CALC-MCNC: 77 MG/DL
NONHDLC SERPL-MCNC: 99 MG/DL
PROT SERPL-MCNC: 7.7 G/DL (ref 6.8–8.8)
TRIGL SERPL-MCNC: 111 MG/DL

## 2020-12-23 PROCEDURE — 80061 LIPID PANEL: CPT | Performed by: PATHOLOGY

## 2020-12-23 PROCEDURE — 80076 HEPATIC FUNCTION PANEL: CPT | Performed by: PATHOLOGY

## 2020-12-23 PROCEDURE — 36415 COLL VENOUS BLD VENIPUNCTURE: CPT | Performed by: PATHOLOGY

## 2020-12-24 ENCOUNTER — THERAPY VISIT (OUTPATIENT)
Dept: PHYSICAL THERAPY | Facility: CLINIC | Age: 65
End: 2020-12-24
Payer: COMMERCIAL

## 2020-12-24 DIAGNOSIS — M54.41 CHRONIC BILATERAL LOW BACK PAIN WITH RIGHT-SIDED SCIATICA: Primary | ICD-10-CM

## 2020-12-24 DIAGNOSIS — M25.561 ACUTE PAIN OF BOTH KNEES: ICD-10-CM

## 2020-12-24 DIAGNOSIS — M25.562 ACUTE PAIN OF BOTH KNEES: ICD-10-CM

## 2020-12-24 DIAGNOSIS — G89.29 CHRONIC BILATERAL LOW BACK PAIN WITH RIGHT-SIDED SCIATICA: Primary | ICD-10-CM

## 2020-12-24 PROCEDURE — 97110 THERAPEUTIC EXERCISES: CPT | Mod: GP | Performed by: PHYSICAL THERAPIST

## 2020-12-24 PROCEDURE — 97140 MANUAL THERAPY 1/> REGIONS: CPT | Mod: GP | Performed by: PHYSICAL THERAPIST

## 2020-12-24 NOTE — PROGRESS NOTES
DISCHARGE REPORT    Progress reporting period is from 11/28/20 to 12/24/20.       SUBJECTIVE  Subjective changes noted by patient:  Continues to note overall improvement.  Low back pain with intermittent pain up to 3/10.  Pain is LB and then R thigh above knee predominantly now.  Numbness is improved.  B knees haven't been bothering much lately.  Did meet with his surgeon and will consider injection/surgery once having been vaccinated for COVID-19, so will mange with his HEP for now, until he gets vaccine and then consider further treatment at that time.  Current Pain level: 3/10.     Initial Pain level: 8/10.   Changes in function:  Yes (See Goal flowsheet attached for changes in current functional level)  Adverse reaction to treatment or activity: None    OBJECTIVE  Changes noted in objective findings:  Yes,   Objective:     Lumbar AROM: flexion to distal 1/3 of tibia.  Extension mod loss.  Pain with initiating motion, but then feels okay with both flex and ext.  B SG is without pain.      No change since last visit.    ASSESSMENT/PLAN  Updated problem list and treatment plan: Diagnosis 1:  LBP, R radiculopathy (stenosis overall most likely, does have disc on MRI as well, but testing consistent with stenosis; B knee medial joint line pain)    Pain -  hot/cold therapy, manual therapy and directional preference exercise  Decreased ROM/flexibility - manual therapy and therapeutic exercise  Decreased strength - therapeutic exercise and therapeutic activities  Decreased proprioception - neuro re-education and therapeutic activities  Decreased function - therapeutic activities  Impaired posture - neuro re-education  STG/LTGs have been met or progress has been made towards goals:  Yes (See Goal flow sheet completed today.)  Assessment of Progress: The patient's condition is improving.  Self Management Plans:  Patient has been instructed in a home treatment program.  I have re-evaluated this patient and find that the  nature, scope, duration and intensity of the therapy is appropriate for the medical condition of the patient.  Sujata continues to require the following intervention to meet STG and LTG's:  Transition to self management with HEP    Recommendations:  This patient will be discharged from PT and continue his HEP independently.  He will call PT if he experiences worsening of symptoms, or consult further with his MD/surgeon.        Please refer to the daily flowsheet for treatment today, total treatment time and time spent performing 1:1 timed codes.

## 2021-01-25 ENCOUNTER — OFFICE VISIT (OUTPATIENT)
Dept: OPHTHALMOLOGY | Facility: CLINIC | Age: 66
End: 2021-01-25
Attending: OPHTHALMOLOGY
Payer: COMMERCIAL

## 2021-01-25 DIAGNOSIS — H01.02A SQUAMOUS BLEPHARITIS OF UPPER AND LOWER EYELIDS OF BOTH EYES: Primary | ICD-10-CM

## 2021-01-25 DIAGNOSIS — H11.001 PTERYGIUM, RIGHT: ICD-10-CM

## 2021-01-25 DIAGNOSIS — H01.02B SQUAMOUS BLEPHARITIS OF UPPER AND LOWER EYELIDS OF BOTH EYES: Primary | ICD-10-CM

## 2021-01-25 DIAGNOSIS — H16.223 KERATOCONJUNCTIVITIS SICCA OF BOTH EYES NOT SPECIFIED AS SJOGREN'S: ICD-10-CM

## 2021-01-25 DIAGNOSIS — H10.13 ALLERGIC CONJUNCTIVITIS, BILATERAL: ICD-10-CM

## 2021-01-25 DIAGNOSIS — H52.4 PRESBYOPIA: ICD-10-CM

## 2021-01-25 PROCEDURE — 99214 OFFICE O/P EST MOD 30 MIN: CPT | Performed by: OPHTHALMOLOGY

## 2021-01-25 PROCEDURE — G0463 HOSPITAL OUTPT CLINIC VISIT: HCPCS

## 2021-01-25 RX ORDER — CYCLOSPORINE 0.5 MG/ML
1 EMULSION OPHTHALMIC 2 TIMES DAILY
Qty: 1 EACH | Refills: 11 | Status: SHIPPED | OUTPATIENT
Start: 2021-01-25

## 2021-01-25 RX ORDER — CYCLOSPORINE 0.5 MG/ML
1 EMULSION OPHTHALMIC 2 TIMES DAILY
Qty: 1 EACH | Refills: 11 | Status: SHIPPED | OUTPATIENT
Start: 2021-01-25 | End: 2021-01-25

## 2021-01-25 ASSESSMENT — TONOMETRY
IOP_METHOD: ICARE
OS_IOP_MMHG: 17
OD_IOP_MMHG: 19

## 2021-01-25 ASSESSMENT — EXTERNAL EXAM - RIGHT EYE: OD_EXAM: NORMAL

## 2021-01-25 ASSESSMENT — CUP TO DISC RATIO
OS_RATIO: 0.2
OD_RATIO: 0.2

## 2021-01-25 ASSESSMENT — VISUAL ACUITY
OS_SC+: -2
METHOD: SNELLEN - LINEAR
OS_SC: 20/20
OD_SC: 20/25
OD_SC+: -2+2

## 2021-01-25 ASSESSMENT — CONF VISUAL FIELD
OS_NORMAL: 1
OD_NORMAL: 1
METHOD: COUNTING FINGERS

## 2021-01-25 ASSESSMENT — SLIT LAMP EXAM - LIDS: COMMENTS: MGD, LID MARGIN TELANGIECTASIAS

## 2021-01-25 ASSESSMENT — EXTERNAL EXAM - LEFT EYE: OS_EXAM: NORMAL

## 2021-01-25 NOTE — NURSING NOTE
Chief Complaints and History of Present Illnesses   Patient presents with     Follow Up     Squamous blepharitis of upper and lower eyelids of both eyes      Chief Complaint(s) and History of Present Illness(es)     Follow Up     Laterality: both eyes    Onset: gradual    Onset: 1 month ago    Course: stable    Associated symptoms: flashes, floaters (No change.) and foreign body sensation.  Negative for glare, haloes, dryness, eye pain, tearing and photophobia    Treatments tried: artificial tears and eye drops    Response to treatment: mild improvement    Pain scale: 0/10    Comments: Squamous blepharitis of upper and lower eyelids of both eyes               Comments     Pt states vision is stable since last visit.  Pt is concerned about his pterygium RE.  Pt feels FBS when using ointment in LE which was treated for a shaving from a bush that hit his LE 3 years ago.  Flashes have gotten better.  Pt stopped FML drops, using ointment, AT's, and has stopped warm compresses.    DM 2  Lab Results       Component                Value               Date                       A1C                      6.2                 10/02/2020                 A1C                      6.4                 10/25/2019                 A1C                      6.5                 06/14/2019                 A1C                      6.4                 12/10/2018                 A1C                      7.1                 08/08/2018            BS were 121 last Friday.    KATHY Bell January 25, 2021 2:46 PM

## 2021-01-25 NOTE — PROGRESS NOTES
HPI  Sujata Valencia is a 65 year old male here for follow-up of bilateral eye redness and right eye pterygium. He states that his left eye redness is improved with the Maxitrol ointment and warm compresses. The lower eyelid bump resolved as well. However, his right eye redness has not changed.    Assessment & Plan       (H01.02A,  H01.02B) Squamous blepharitis of upper and lower eyelids of both eyes  (H11.001) Pterygium, right  (H16.223) Keratoconjunctivitis sicca of both eyes not specified as Sjogren's  Comment: Persistent right eye redness and inflamed pterygium as well as PEE both eyes.   Plan: Continue warm compresses BID, ATs 3x daily  Continue Maxitrol both eyes at bedtime, hold FML for now  Start Restasis both eyes BID    (H10.13) Allergic conjunctivitis, bilateral  (primary encounter diagnosis)  Comment: Controlled with patanol, but he prefers the BID dosing of 0.1%  Plan: Continue patanol 0.1% BID    (R73.03) Borderline type 2 diabetes mellitus  (primary encounter diagnosis)  Comment: He has been followed for this since 2012.On metformin. Last A1c 6.2 10/2020. No diabetic retinopathy at last DFE (9/2020). Undilated today.  Plan: Discussed the importance of tight blood glucose control in the prevention of diabetic retinopathy. Recommend yearly dilated eye exam.    (H52.4) Presbyopia OU  Comment: Good distance vision without correction  Plan: Ok to continue with OTC readers    -----------------------------------------------------------------------------------    Patient disposition:   Return in about 3 months (around 4/25/2021) for ocular surface check, or sooner as needed. or sooner as needed.    Teaching statement:  Complete documentation of historical and exam elements from today's encounter can be found in the full encounter summary report (not reduplicated in this progress note). I personally obtained the chief complaint(s) and history of present illness.  I confirmed and edited as necessary the  review of systems, past medical/surgical history, family history, social history, and examination findings as documented by others; and I examined the patient myself. I personally reviewed the relevant tests, images, and reports as documented above.     I formulated and edited as necessary the assessment and plan and discussed the findings and management plan with the patient and family.    Maria Teresa Cabrera MD  Comprehensive Ophthalmology & Ocular Pathology  Department of Ophthalmology and Visual Neurosciences  [email or web address]  Pager [phone]

## 2021-01-28 DIAGNOSIS — G89.29 CHRONIC BILATERAL LOW BACK PAIN WITH RIGHT-SIDED SCIATICA: ICD-10-CM

## 2021-01-28 DIAGNOSIS — M54.41 CHRONIC BILATERAL LOW BACK PAIN WITH RIGHT-SIDED SCIATICA: ICD-10-CM

## 2021-01-28 RX ORDER — GABAPENTIN 300 MG/1
CAPSULE ORAL
Qty: 120 CAPSULE | Refills: 0 | Status: SHIPPED | OUTPATIENT
Start: 2021-01-28

## 2021-02-09 ENCOUNTER — MYC MEDICAL ADVICE (OUTPATIENT)
Dept: INTERNAL MEDICINE | Facility: CLINIC | Age: 66
End: 2021-02-09

## 2021-03-16 ASSESSMENT — ENCOUNTER SYMPTOMS
EYE PAIN: 0
MUSCLE CRAMPS: 0
STIFFNESS: 0
EYE WATERING: 0
ARTHRALGIAS: 1
EYE IRRITATION: 1
EYE REDNESS: 1
BACK PAIN: 1
JOINT SWELLING: 0
MYALGIAS: 0
DOUBLE VISION: 0
NECK PAIN: 0
MUSCLE WEAKNESS: 0

## 2021-03-19 ENCOUNTER — OFFICE VISIT (OUTPATIENT)
Dept: ORTHOPEDICS | Facility: CLINIC | Age: 66
End: 2021-03-19
Payer: COMMERCIAL

## 2021-03-19 DIAGNOSIS — M48.062 SPINAL STENOSIS OF LUMBAR REGION WITH NEUROGENIC CLAUDICATION: Primary | ICD-10-CM

## 2021-03-19 PROCEDURE — 99213 OFFICE O/P EST LOW 20 MIN: CPT | Performed by: PHYSICIAN ASSISTANT

## 2021-03-19 NOTE — LETTER
3/19/2021         RE: Sujata Valencia  [address] [place] [address]        Dear Colleague,    Thank you for referring your patient, Sujata Valencia, to the [facility] [place]. Please see a copy of my visit note below.    Spine Surgery Return Clinic Visit      Chief Complaint:   RECHECK (F/U Lumbar radiculopathy )      Interval HPI:     Sujata Valencia is a 65 year old male who presents today for follow up on caludication and radiculopathy. He was last seen 12/18/20 at which time he was being treated with PT, gabapentin, and ANASTASIYA. His right radicular pain has resolved. The back pain has decreased a lot. There is still some sharp pain in his back and RLE with certain movement. He can stand, but the calves go numb (varies 5 min to 1 hour). Same with walking, he will have to sit at times after 100 yards d/t calf cramping; when he sits the symptoms completely resolve and he can walk again. The pain has been most significantly helped with PT, posture improvements, and changing chairs and mattresses to be harder. He averages his pain 3/10. No urinary incontinence. He is off gabapentin as pain has improved.     Answers for HPI/ROS submitted by the patient on 3/16/2021   General Symptoms: No  Skin Symptoms: No  HENT Symptoms: No  EYE SYMPTOMS: Yes  HEART SYMPTOMS: No  LUNG SYMPTOMS: No  INTESTINAL SYMPTOMS: No  URINARY SYMPTOMS: No  REPRODUCTIVE SYMPTOMS: Yes  SKELETAL SYMPTOMS: Yes  BLOOD SYMPTOMS: No  NERVOUS SYSTEM SYMPTOMS: No  MENTAL HEALTH SYMPTOMS: No  Eye pain: No  Vision loss: No  Dry eyes: Yes  Watery eyes: No  Eye bulging: No  Double vision: No  Flashing of lights: No  Spots: No  Floaters: No  Redness: Yes  Crossed eyes: No  Tunnel Vision: No  Yellowing of eyes: No  Eye irritation: Yes  Back pain: Yes  Muscle aches: No  Neck pain: No  Swollen joints: No  Joint pain: Yes  Bone pain: No  Muscle cramps: No  Muscle weakness: No  Joint stiffness: No  Bone fracture:  No  Scrotal pain or swelling: No  Erectile dysfunction: Yes  Penile discharge: No  Genital ulcers: No  Reduced libido: Yes         Past Medical History:     Past Medical History:   Diagnosis Date     Arthritis      Back pains, lower      Chest pain 2014     Hypertension      Myocarditis (H)      Other abnormal glucose 2011     Other and unspecified hyperlipidemia 2011     Subclinical hypothyroidism             Past Surgical History:     Past Surgical History:   Procedure Laterality Date     COLONOSCOPY  1998    fistula      CORONARY ANGIOGRAPHY ADULT ORDER  2014    normal coronary arteries            Social History:     Social History     Tobacco Use     Smoking status: Former Smoker     Packs/day: 1.00     Years: 25.00     Pack years: 25.00     Start date:      Quit date: 10/31/2001     Years since quittin.3     Smokeless tobacco: Never Used   Substance Use Topics     Alcohol use: Yes     Comment: scotch 2 times a week, wine 5 days per week at drs request            Family History:     Family History   Problem Relation Age of Onset     Osteoporosis Mother      Diabetes Maternal Grandmother      Glaucoma No family hx of      Macular Degeneration No family hx of      Hypertension No family hx of             Allergies:     Allergies   Allergen Reactions     Seasonal Allergies             Medications:     Current Outpatient Medications   Medication     aspirin 81 MG tablet     atorvastatin (LIPITOR) 10 MG tablet     blood glucose (NO BRAND SPECIFIED) lancets standard     blood glucose (NO BRAND SPECIFIED) test strip     blood glucose (NO BRAND SPECIFIED) test strip     blood glucose monitoring (NO BRAND SPECIFIED) meter device kit     blood glucose monitoring (NO BRAND SPECIFIED) meter device kit     blood glucose monitoring (NO BRAND SPECIFIED) test strip     blood glucose monitoring (ONE TOUCH DELICA) lancets     clotrimazole-betamethasone (LOTRISONE) 1-0.05 % external cream      Continuous Blood Gluc  (DEXCOM G5  KIT) ANTONIO     cycloSPORINE (RESTASIS) 0.05 % ophthalmic emulsion     diclofenac (VOLTAREN) 1 % topical gel     fluorometholone (FML LIQUIFILM) 0.1 % ophthalmic suspension     fluorometholone (FML LIQUIFILM) 0.1 % ophthalmic suspension     gabapentin (NEURONTIN) 100 MG capsule     gabapentin (NEURONTIN) 300 MG capsule     GINSENG PO     metFORMIN (GLUCOPHAGE-XR) 500 MG 24 hr tablet     Multiple Vitamin (MULTIVITAMINS PO)     neomycin-polymyxin-dexamethasone (MAXITROL) 3.5-62232-6.1 ophthalmic ointment     nicotine (NICORETTE) 2 MG gum     olopatadine (PATANOL) 0.1 % ophthalmic solution     Omega-3 Fatty Acids (FISH OIL PO)     psyllium (METAMUCIL) 58.6 % POWD     sildenafil (VIAGRA) 100 MG tablet     trimethoprim-polymyxin b (POLYTRIM) 41484-4.1 UNIT/ML-% ophthalmic solution     Current Facility-Administered Medications   Medication     cross-Linked Hyaluronate (GEL-ONE) injection PRSY 30 mg     cross-Linked Hyaluronate (GEL-ONE) injection PRSY 30 mg     cross-Linked Hyaluronate (GEL-ONE) injection PRSY 30 mg     lidocaine (PF) (XYLOCAINE) 1 % injection 2 mL     lidocaine (PF) (XYLOCAINE) 1 % injection 2 mL     lidocaine (PF) (XYLOCAINE) 1 % injection 5 mL     triamcinolone (KENALOG-40) injection 40 mg             Physical Exam:     Constitutional: Patient is healthy, well-nourished and appears stated age.    Respiratory: Patient is breathing normally and in no respiratory distress.    Gait: Non-antalgic gait without use of assistive devices.      Musculoskeletal: Strength: 5/5 iliopsoas, 5/5 quadriceps, 5/5 hamstrings, 5/5 anterior tibialis, 5/5 extensor hallucis longus, 5/5 gastrocnemius.           Imaging:   We again reviewed his lumbar x-ray (11/5/20) and MRI (11/6/20), which show severe degenerative changes and central stenosis L3-5 with right L5-S1 lateral recess stenosis. Right lumbar scoliosis.      Assessment:   65 year old male with neurogenic claudication  and central stenosis L3-L5 and right L5-S1 lateral recession stenosis      Plan:   1. Dr. Allen again discussed the surgery which would be L3-S1 decompression. He discussed the main goal would be to alleviate his leg symptoms and improve his ability to walk or stand. The patient is not ready to go through with surgery at this time, but he was given our contact information if he wants to schedule.  2. Follow up with Dr. Allen in 3 months for recheck, virtual or in person.     Plan formulated with Dr. Allen who also saw the patient and reviewed imaging. We used the patient's imaging and models to explain their pathophysiology and treatment options. All the patient's questions were answered to their satisfaction.     Thank you for allowing me to be a part of this patient's care.     Attending MD (Dr. Eliot Allen) :  I reviewed and verified the history and physical exam of the patient and discussed the patient's management with the other clinical providers involved in this patient's care including any involved residents or physicians assistants. I reviewed the above note and agree with the documented findings and plan of care, which were communicated to the patient.      MD Seble Gale, PA-C   Orthopedic Spine Surgery

## 2021-03-19 NOTE — PROGRESS NOTES
Spine Surgery Return Clinic Visit      Chief Complaint:   RECHECK (F/U Lumbar radiculopathy )      Interval HPI:     Sujata Valencia is a 65 year old male who presents today for follow up on caludication and radiculopathy. He was last seen 12/18/20 at which time he was being treated with PT, gabapentin, and ANASTASIYA. His right radicular pain has resolved. The back pain has decreased a lot. There is still some sharp pain in his back and RLE with certain movement. He can stand, but the calves go numb (varies 5 min to 1 hour). Same with walking, he will have to sit at times after 100 yards d/t calf cramping; when he sits the symptoms completely resolve and he can walk again. The pain has been most significantly helped with PT, posture improvements, and changing chairs and mattresses to be harder. He averages his pain 3/10. No urinary incontinence. He is off gabapentin as pain has improved.     Answers for HPI/ROS submitted by the patient on 3/16/2021   General Symptoms: No  Skin Symptoms: No  HENT Symptoms: No  EYE SYMPTOMS: Yes  HEART SYMPTOMS: No  LUNG SYMPTOMS: No  INTESTINAL SYMPTOMS: No  URINARY SYMPTOMS: No  REPRODUCTIVE SYMPTOMS: Yes  SKELETAL SYMPTOMS: Yes  BLOOD SYMPTOMS: No  NERVOUS SYSTEM SYMPTOMS: No  MENTAL HEALTH SYMPTOMS: No  Eye pain: No  Vision loss: No  Dry eyes: Yes  Watery eyes: No  Eye bulging: No  Double vision: No  Flashing of lights: No  Spots: No  Floaters: No  Redness: Yes  Crossed eyes: No  Tunnel Vision: No  Yellowing of eyes: No  Eye irritation: Yes  Back pain: Yes  Muscle aches: No  Neck pain: No  Swollen joints: No  Joint pain: Yes  Bone pain: No  Muscle cramps: No  Muscle weakness: No  Joint stiffness: No  Bone fracture: No  Scrotal pain or swelling: No  Erectile dysfunction: Yes  Penile discharge: No  Genital ulcers: No  Reduced libido: Yes         Past Medical History:     Past Medical History:   Diagnosis Date     Arthritis      Back pains, lower      Chest pain 7/28/2014      Hypertension      Myocarditis (H)      Other abnormal glucose 2011     Other and unspecified hyperlipidemia 2011     Subclinical hypothyroidism             Past Surgical History:     Past Surgical History:   Procedure Laterality Date     COLONOSCOPY  1998    fistula      CORONARY ANGIOGRAPHY ADULT ORDER  2014    normal coronary arteries            Social History:     Social History     Tobacco Use     Smoking status: Former Smoker     Packs/day: 1.00     Years: 25.00     Pack years: 25.00     Start date:      Quit date: 10/31/2001     Years since quittin.3     Smokeless tobacco: Never Used   Substance Use Topics     Alcohol use: Yes     Comment: scotch 2 times a week, wine 5 days per week at drs request            Family History:     Family History   Problem Relation Age of Onset     Osteoporosis Mother      Diabetes Maternal Grandmother      Glaucoma No family hx of      Macular Degeneration No family hx of      Hypertension No family hx of             Allergies:     Allergies   Allergen Reactions     Seasonal Allergies             Medications:     Current Outpatient Medications   Medication     aspirin 81 MG tablet     atorvastatin (LIPITOR) 10 MG tablet     blood glucose (NO BRAND SPECIFIED) lancets standard     blood glucose (NO BRAND SPECIFIED) test strip     blood glucose (NO BRAND SPECIFIED) test strip     blood glucose monitoring (NO BRAND SPECIFIED) meter device kit     blood glucose monitoring (NO BRAND SPECIFIED) meter device kit     blood glucose monitoring (NO BRAND SPECIFIED) test strip     blood glucose monitoring (ONE TOUCH DELICA) lancets     clotrimazole-betamethasone (LOTRISONE) 1-0.05 % external cream     Continuous Blood Gluc  (DEXCOM G5  KIT) ANTONIO     cycloSPORINE (RESTASIS) 0.05 % ophthalmic emulsion     diclofenac (VOLTAREN) 1 % topical gel     fluorometholone (FML LIQUIFILM) 0.1 % ophthalmic suspension     fluorometholone (FML LIQUIFILM) 0.1 %  ophthalmic suspension     gabapentin (NEURONTIN) 100 MG capsule     gabapentin (NEURONTIN) 300 MG capsule     GINSENG PO     metFORMIN (GLUCOPHAGE-XR) 500 MG 24 hr tablet     Multiple Vitamin (MULTIVITAMINS PO)     neomycin-polymyxin-dexamethasone (MAXITROL) 3.5-75587-1.1 ophthalmic ointment     nicotine (NICORETTE) 2 MG gum     olopatadine (PATANOL) 0.1 % ophthalmic solution     Omega-3 Fatty Acids (FISH OIL PO)     psyllium (METAMUCIL) 58.6 % POWD     sildenafil (VIAGRA) 100 MG tablet     trimethoprim-polymyxin b (POLYTRIM) 69776-1.1 UNIT/ML-% ophthalmic solution     Current Facility-Administered Medications   Medication     cross-Linked Hyaluronate (GEL-ONE) injection PRSY 30 mg     cross-Linked Hyaluronate (GEL-ONE) injection PRSY 30 mg     cross-Linked Hyaluronate (GEL-ONE) injection PRSY 30 mg     lidocaine (PF) (XYLOCAINE) 1 % injection 2 mL     lidocaine (PF) (XYLOCAINE) 1 % injection 2 mL     lidocaine (PF) (XYLOCAINE) 1 % injection 5 mL     triamcinolone (KENALOG-40) injection 40 mg             Physical Exam:     Constitutional: Patient is healthy, well-nourished and appears stated age.    Respiratory: Patient is breathing normally and in no respiratory distress.    Gait: Non-antalgic gait without use of assistive devices.      Musculoskeletal: Strength: 5/5 iliopsoas, 5/5 quadriceps, 5/5 hamstrings, 5/5 anterior tibialis, 5/5 extensor hallucis longus, 5/5 gastrocnemius.           Imaging:   We again reviewed his lumbar x-ray (11/5/20) and MRI (11/6/20), which show severe degenerative changes and central stenosis L3-5 with right L5-S1 lateral recess stenosis. Right lumbar scoliosis.      Assessment:   65 year old male with neurogenic claudication and central stenosis L3-L5 and right L5-S1 lateral recession stenosis      Plan:   1. Dr. Allen again discussed the surgery which would be L3-S1 decompression. He discussed the main goal would be to alleviate his leg symptoms and improve his ability to walk or  stand. The patient is not ready to go through with surgery at this time, but he was given our contact information if he wants to schedule.  2. Follow up with Dr. Allen in 3 months for recheck, virtual or in person.     Plan formulated with Dr. Allen who also saw the patient and reviewed imaging. We used the patient's imaging and models to explain their pathophysiology and treatment options. All the patient's questions were answered to their satisfaction.     Thank you for allowing me to be a part of this patient's care.     Attending MD (Dr. Eliot Allen) :  I reviewed and verified the history and physical exam of the patient and discussed the patient's management with the other clinical providers involved in this patient's care including any involved residents or physicians assistants. I reviewed the above note and agree with the documented findings and plan of care, which were communicated to the patient.      MD Seble Gale, PA-C   Orthopedic Spine Surgery

## 2021-04-28 ENCOUNTER — OFFICE VISIT (OUTPATIENT)
Dept: OPHTHALMOLOGY | Facility: CLINIC | Age: 66
End: 2021-04-28
Attending: OPHTHALMOLOGY
Payer: COMMERCIAL

## 2021-04-28 DIAGNOSIS — H01.02B SQUAMOUS BLEPHARITIS OF UPPER AND LOWER EYELIDS OF BOTH EYES: ICD-10-CM

## 2021-04-28 DIAGNOSIS — H11.001 PTERYGIUM, RIGHT: ICD-10-CM

## 2021-04-28 DIAGNOSIS — H01.02A SQUAMOUS BLEPHARITIS OF UPPER AND LOWER EYELIDS OF BOTH EYES: ICD-10-CM

## 2021-04-28 DIAGNOSIS — H10.13 ALLERGIC CONJUNCTIVITIS, BILATERAL: ICD-10-CM

## 2021-04-28 PROCEDURE — G0463 HOSPITAL OUTPT CLINIC VISIT: HCPCS

## 2021-04-28 PROCEDURE — 99214 OFFICE O/P EST MOD 30 MIN: CPT | Performed by: OPHTHALMOLOGY

## 2021-04-28 RX ORDER — NEOMYCIN SULFATE, POLYMYXIN B SULFATE, AND DEXAMETHASONE 3.5; 10000; 1 MG/G; [USP'U]/G; MG/G
0.5 OINTMENT OPHTHALMIC AT BEDTIME
Qty: 3.5 G | Refills: 4 | Status: SHIPPED | OUTPATIENT
Start: 2021-04-28 | End: 2021-04-28

## 2021-04-28 RX ORDER — OLOPATADINE HYDROCHLORIDE 1 MG/ML
1 SOLUTION/ DROPS OPHTHALMIC 2 TIMES DAILY
Qty: 5 ML | Refills: 11 | Status: SHIPPED | OUTPATIENT
Start: 2021-04-28

## 2021-04-28 RX ORDER — NEOMYCIN SULFATE, POLYMYXIN B SULFATE, AND DEXAMETHASONE 3.5; 10000; 1 MG/G; [USP'U]/G; MG/G
0.5 OINTMENT OPHTHALMIC AT BEDTIME
Qty: 3.5 G | Refills: 4 | Status: SHIPPED | OUTPATIENT
Start: 2021-04-28

## 2021-04-28 ASSESSMENT — TONOMETRY
OS_IOP_MMHG: 21
IOP_METHOD: ICARE
OD_IOP_MMHG: 21

## 2021-04-28 ASSESSMENT — CONF VISUAL FIELD
OD_NORMAL: 1
OS_NORMAL: 1

## 2021-04-28 ASSESSMENT — CUP TO DISC RATIO
OD_RATIO: 0.2
OS_RATIO: 0.2

## 2021-04-28 ASSESSMENT — SLIT LAMP EXAM - LIDS: COMMENTS: MGD, LID MARGIN TELANGIECTASIAS

## 2021-04-28 ASSESSMENT — VISUAL ACUITY
OD_SC+: -2
METHOD: SNELLEN - LINEAR
OD_SC: 20/20
OS_SC: 20/20

## 2021-04-28 ASSESSMENT — EXTERNAL EXAM - LEFT EYE: OS_EXAM: NORMAL

## 2021-04-28 ASSESSMENT — EXTERNAL EXAM - RIGHT EYE: OD_EXAM: NORMAL

## 2021-04-28 NOTE — NURSING NOTE
Chief Complaints and History of Present Illnesses   Patient presents with     Blepharitis Follow Up     Chief Complaint(s) and History of Present Illness(es)     Blepharitis Follow Up     Laterality: both eyes    Duration: 3 months              Comments     Pt. States that itching has resolved. No change in VA BE. Has been having some irritation but overall improved.   Jody Gallardo COT 2:17 PM April 28, 2021

## 2021-04-28 NOTE — PROGRESS NOTES
NAYELY  Sujata Valencia is a 65 year old male here for follow-up of bilateral eye redness and right eye pterygium. He feels his eye itching and irritation is overall improved, but he has persistent redness related to his right eye pterygium.     Assessment & Plan       (H01.02A,  H01.02B) Squamous blepharitis of upper and lower eyelids of both eyes  (H11.001) Pterygium, right  (H16.223) Keratoconjunctivitis sicca of both eyes not specified as Sjogren's  Comment: Persistent right eye redness related to pterygium. The pterygiumr remains somewhat elevated in injected, but does look less inflamed. Ocular surface improved.  Plan: Continue warm compresses BID, ATs 3x daily  Continue Maxitrol both eyes at bedtime  Continue Restasis both eyes BID  Discussed option of referral for surgical excision of his pterygium if medical therapy does not improve his symptoms further. He would prefer to continue medical treatment for now because he has a busy summer. May consider again in the fall.     (H10.13) Allergic conjunctivitis, bilateral  (primary encounter diagnosis)  Comment: He prefers the BID dosing of 0.1% rather than pataday  Plan: Continue patanol 0.1% BID    (R73.03) Borderline type 2 diabetes mellitus  (primary encounter diagnosis)  Comment: He has been followed for this since 2012.On metformin. Last A1c 6.2 10/2020. No diabetic retinopathy at last DFE (9/2020). Undilated today.  Plan: Discussed the importance of tight blood glucose control in the prevention of diabetic retinopathy. Recommend yearly dilated eye exam.    (H52.4) Presbyopia OU  Comment: Good distance vision without correction  Plan: Ok to continue with OTC readers    -----------------------------------------------------------------------------------    Patient disposition:   Return in about 6 months (around 10/28/2021) for ocular surface check and dilated diabetic exam. or sooner as needed.    Teaching statement:  Complete documentation of historical and  exam elements from today's encounter can be found in the full encounter summary report (not reduplicated in this progress note). I personally obtained the chief complaint(s) and history of present illness.  I confirmed and edited as necessary the review of systems, past medical/surgical history, family history, social history, and examination findings as documented by others; and I examined the patient myself. I personally reviewed the relevant tests, images, and reports as documented above.     I formulated and edited as necessary the assessment and plan and discussed the findings and management plan with the patient and family.    Maria Teresa Cabrera MD  Comprehensive Ophthalmology & Ocular Pathology  Department of Ophthalmology and Visual Neurosciences  [email or web address]  Pager [phone]

## 2021-05-05 ENCOUNTER — MYC MEDICAL ADVICE (OUTPATIENT)
Dept: ORTHOPEDICS | Facility: CLINIC | Age: 66
End: 2021-05-05

## 2021-05-05 DIAGNOSIS — N40.0 BENIGN NODULAR PROSTATIC HYPERPLASIA WITHOUT LOWER URINARY TRACT SYMPTOMS: ICD-10-CM

## 2021-05-05 DIAGNOSIS — Z12.11 SCREEN FOR COLON CANCER: ICD-10-CM

## 2021-05-05 DIAGNOSIS — F17.200 SMOKING: ICD-10-CM

## 2021-05-05 DIAGNOSIS — I10 BENIGN ESSENTIAL HYPERTENSION: ICD-10-CM

## 2021-05-05 DIAGNOSIS — M17.0 PRIMARY OSTEOARTHRITIS OF BOTH KNEES: Primary | ICD-10-CM

## 2021-05-05 DIAGNOSIS — Z12.5 SCREENING FOR PROSTATE CANCER: ICD-10-CM

## 2021-05-05 DIAGNOSIS — N52.01 ERECTILE DYSFUNCTION DUE TO ARTERIAL INSUFFICIENCY: ICD-10-CM

## 2021-05-05 DIAGNOSIS — E78.00 HIGH CHOLESTEROL: ICD-10-CM

## 2021-05-05 DIAGNOSIS — L98.9 SKIN LESION: ICD-10-CM

## 2021-05-06 ENCOUNTER — TELEPHONE (OUTPATIENT)
Dept: ORTHOPEDICS | Facility: CLINIC | Age: 66
End: 2021-05-06

## 2021-05-06 DIAGNOSIS — M17.0 PRIMARY OSTEOARTHRITIS OF BOTH KNEES: Primary | ICD-10-CM

## 2021-05-06 RX ORDER — HYALURONATE SOD, CROSS-LINKED 30 MG/3 ML
30 SYRINGE (ML) INTRAARTICULAR ONCE
Qty: 6 ML | Refills: 0
Start: 2021-05-06 | End: 2021-05-06

## 2021-05-06 NOTE — TELEPHONE ENCOUNTER
Team - I have ordered Gel-One injections for Mr Valencia's upcoming appointment on 5/18/21. Please get authorized/ ordered prior to visit. Thank you!    Anton Ha MD  May 6, 2021  9:11 AM

## 2021-05-06 NOTE — TELEPHONE ENCOUNTER
[facility] Call Center    Phone Message    May a detailed message be left on voicemail: yes     Reason for Call: Other: Pt called back,  read note from prev call, patient would like to know from Dr Ha how well Synvics One works? He does not want to waste money in getting an injection that may not work. Please call pt back at [phone]     Action Taken: Message routed to:  [facility] Ortho    Travel Screening: Not Applicable

## 2021-05-06 NOTE — TELEPHONE ENCOUNTER
LVM for patient to call back. Left call back number.    Sounds like insurance changed brands that are covered. Dr. Ha would like to make sure patient is aware of change. Gel One no longer covered, but Synvisc One is now covered.

## 2021-05-06 NOTE — TELEPHONE ENCOUNTER
----- Message from Anton Ha MD sent at 5/6/2021 10:31 AM CDT -----  Regarding: RE: Prior authorization for Gel One  Can you contact him and confirm he is ok with Synvisc One instead of Gel-One?  It is weird because it looks like with the last visit I initially ordered SynviscOne and then changed to gel-one?  I have placed the order SynviscOne.     Anton Ha MD  May 6, 2021  10:33 AM    ----- Message -----  From: Liz Fields ATC  Sent: 5/6/2021   9:40 AM CDT  To: Anton Ha MD  Subject: Prior authorization for Gel One                  Please see message below.  ----- Message -----  From: Marla Olmedo  Sent: 5/6/2021   9:36 AM CDT  To: Liz Fields ATC    Hi Raul,    We received a request to secure Gel-One for this patient however [employer] preferred products are Synvisc, Synvisc One or Euflexxa. Can you tell me if one of these alternative treatments would be an option & if so have a new order placed?    Thank you,    Marla

## 2021-05-07 RX ORDER — ATORVASTATIN CALCIUM 10 MG/1
TABLET, FILM COATED ORAL
Qty: 90 TABLET | Refills: 1 | Status: SHIPPED | OUTPATIENT
Start: 2021-05-07 | End: 2021-05-17

## 2021-05-07 NOTE — TELEPHONE ENCOUNTER
Last Clinic Visit: 11/17/20  NV:  NONE  BP  NOTED   BP Readings from Last 3 Encounters:   11/17/20 (!) 149/80   09/29/20 127/82   10/25/19 124/84

## 2021-05-07 NOTE — TELEPHONE ENCOUNTER
[facility] Call Center    Phone Message    May a detailed message be left on voicemail: yes     Reason for Call: Other: Patient is requesting a call back. Patient would like to discuss what kind of injection the Synvisc is.      Action Taken: Message routed to:  [facility] ORTHO    Travel Screening: Not Applicable

## 2021-05-07 NOTE — TELEPHONE ENCOUNTER
Called and explained to the patient that Dr. Ha states that the gel injections are equivocal and he uses Synvisc One more than Gel One. It sounds like his insurance covered Gel One last year and switched over. I told him that only the brands changed. The patient verbalized understanding and had no further questions.

## 2021-05-17 ENCOUNTER — OFFICE VISIT (OUTPATIENT)
Dept: INTERNAL MEDICINE | Facility: CLINIC | Age: 66
End: 2021-05-17
Payer: COMMERCIAL

## 2021-05-17 VITALS
WEIGHT: 199 LBS | HEART RATE: 99 BPM | SYSTOLIC BLOOD PRESSURE: 137 MMHG | BODY MASS INDEX: 27.55 KG/M2 | OXYGEN SATURATION: 96 % | DIASTOLIC BLOOD PRESSURE: 84 MMHG

## 2021-05-17 DIAGNOSIS — Z12.5 SCREENING FOR PROSTATE CANCER: ICD-10-CM

## 2021-05-17 DIAGNOSIS — L98.9 SKIN LESION: ICD-10-CM

## 2021-05-17 DIAGNOSIS — N52.01 ERECTILE DYSFUNCTION DUE TO ARTERIAL INSUFFICIENCY: ICD-10-CM

## 2021-05-17 DIAGNOSIS — F17.200 SMOKING: ICD-10-CM

## 2021-05-17 DIAGNOSIS — Z12.11 SCREEN FOR COLON CANCER: ICD-10-CM

## 2021-05-17 DIAGNOSIS — Z12.11 SPECIAL SCREENING FOR MALIGNANT NEOPLASMS, COLON: ICD-10-CM

## 2021-05-17 DIAGNOSIS — E11.65 TYPE 2 DIABETES MELLITUS WITH HYPERGLYCEMIA, WITHOUT LONG-TERM CURRENT USE OF INSULIN (H): ICD-10-CM

## 2021-05-17 DIAGNOSIS — N40.0 BENIGN NODULAR PROSTATIC HYPERPLASIA WITHOUT LOWER URINARY TRACT SYMPTOMS: ICD-10-CM

## 2021-05-17 DIAGNOSIS — E78.00 HIGH CHOLESTEROL: ICD-10-CM

## 2021-05-17 DIAGNOSIS — I10 BENIGN ESSENTIAL HYPERTENSION: ICD-10-CM

## 2021-05-17 DIAGNOSIS — R73.09 INCREASED GLUCOSE LEVEL: ICD-10-CM

## 2021-05-17 DIAGNOSIS — R73.09 INCREASED GLUCOSE LEVEL: Primary | ICD-10-CM

## 2021-05-17 LAB
ALBUMIN UR-MCNC: NEGATIVE MG/DL
APPEARANCE UR: NORMAL
BILIRUB UR QL STRIP: NEGATIVE
COLOR UR AUTO: YELLOW
GLUCOSE UR STRIP-MCNC: NEGATIVE MG/DL
HBA1C MFR BLD: 6.2 % (ref 0–5.6)
HGB UR QL STRIP: NEGATIVE
KETONES UR STRIP-MCNC: NEGATIVE MG/DL
LEUKOCYTE ESTERASE UR QL STRIP: NEGATIVE
NITRATE UR QL: NEGATIVE
PH UR STRIP: 7 PH (ref 5–7)
RBC #/AREA URNS AUTO: <1 /HPF (ref 0–2)
SOURCE: NORMAL
SP GR UR STRIP: 1.02 (ref 1–1.03)
UROBILINOGEN UR STRIP-MCNC: 0 MG/DL (ref 0–2)
WBC #/AREA URNS AUTO: <1 /HPF (ref 0–5)

## 2021-05-17 PROCEDURE — 83036 HEMOGLOBIN GLYCOSYLATED A1C: CPT | Performed by: PATHOLOGY

## 2021-05-17 PROCEDURE — 36415 COLL VENOUS BLD VENIPUNCTURE: CPT | Performed by: PATHOLOGY

## 2021-05-17 PROCEDURE — 81001 URINALYSIS AUTO W/SCOPE: CPT | Performed by: PATHOLOGY

## 2021-05-17 PROCEDURE — 99214 OFFICE O/P EST MOD 30 MIN: CPT | Performed by: INTERNAL MEDICINE

## 2021-05-17 RX ORDER — METFORMIN HCL 500 MG
TABLET, EXTENDED RELEASE 24 HR ORAL
Qty: 180 TABLET | Refills: 3 | Status: SHIPPED | OUTPATIENT
Start: 2021-05-17 | End: 2022-07-18

## 2021-05-17 RX ORDER — ATORVASTATIN CALCIUM 10 MG/1
10 TABLET, FILM COATED ORAL DAILY
Qty: 90 TABLET | Refills: 3 | Status: SHIPPED | OUTPATIENT
Start: 2021-05-17 | End: 2022-07-08

## 2021-05-17 ASSESSMENT — PAIN SCALES - GENERAL: PAINLEVEL: MILD PAIN (3)

## 2021-05-17 NOTE — PROGRESS NOTES
HPI:    Last visit with me 11/17/2020 and additional details in that note. Pt. Comes in today and he is moving [facility] to be closer to his son and grand children. He planning to move in about one month. Overall no new HEENT, cardiopulmonary, abdominal, , GYN, neurological, systemic, vascular, endocrine, psychiatric, lymphatic complaints. He has reduced his EtOH consumption. He has some minor urinary complaints of incomplete emptying.     Past Medical History:   Diagnosis Date     Arthritis      Back pains, lower      Chest pain 7/28/2014     Hypertension      Myocarditis (H)      Other abnormal glucose 7/21/2011     Other and unspecified hyperlipidemia 7/21/2011     Subclinical hypothyroidism      Past Surgical History:   Procedure Laterality Date     COLONOSCOPY  1998    fistula      CORONARY ANGIOGRAPHY ADULT ORDER  07/29/2014    normal coronary arteries     PE:    Vitals noted, gen, nad, cooperative, alert, neck supple nl rom, lungs with good air movement, RRR, S1, S2, no MRG, abdomen, no acute findings and grossly normal neurological exam.     A/P:    1. He has ortho spine follow up with Dr. Allen, 6/18/2021 for back pain. Last visit 3/19/2021  2. Ortho appt. With Dr. Ha 5/18/2021 for B knee pain.  3. Cardiac; resting echo done 6/27/2018 with EF 45-50% with mild global hypokinesia. No valve disease noted. He saw Dr. Rich, Cardiology 6/28/20218 (h/o myocarditis).   4. DM2; on Metformin and ordered A1c today  5. PSA done 10/2/2020 at 0.46  6. Immunizations; he has completed the Moderna COVID vaccination   7. Increased lipids; checked 12/23/2020 with HDL 60 and LDL 77 on atorvastatin   8. HTN; Stable BP today  9. Colonoscopy; 5/3/2017 and repeat in 3-5  Years and ordered today 5/17/2021 and he will wait on this because he is moving.   10. UA today for urinary complaints and recommend Urology evaluation if worse.     30 minutes spent on the date of the encounter doing chart review,  history and exam, documentation and further activities as noted above

## 2021-05-17 NOTE — NURSING NOTE
Chief Complaint   Patient presents with     Recheck Medication     pt here to follow up       Lima Perez CMA, EMT at 1:25 PM on 5/17/2021.

## 2021-05-18 ENCOUNTER — OFFICE VISIT (OUTPATIENT)
Dept: ORTHOPEDICS | Facility: CLINIC | Age: 66
End: 2021-05-18
Payer: COMMERCIAL

## 2021-05-18 VITALS — BODY MASS INDEX: 27.86 KG/M2 | WEIGHT: 199 LBS | HEIGHT: 71 IN

## 2021-05-18 DIAGNOSIS — M17.0 PRIMARY OSTEOARTHRITIS OF BOTH KNEES: Primary | ICD-10-CM

## 2021-05-18 PROCEDURE — 20610 DRAIN/INJ JOINT/BURSA W/O US: CPT | Mod: 50 | Performed by: FAMILY MEDICINE

## 2021-05-18 PROCEDURE — 99207 PR DROP WITH A PROCEDURE: CPT | Performed by: FAMILY MEDICINE

## 2021-05-18 RX ORDER — LIDOCAINE HYDROCHLORIDE 10 MG/ML
3 INJECTION, SOLUTION EPIDURAL; INFILTRATION; INTRACAUDAL; PERINEURAL
Status: SHIPPED | OUTPATIENT
Start: 2021-05-18

## 2021-05-18 RX ADMIN — LIDOCAINE HYDROCHLORIDE 3 ML: 10 INJECTION, SOLUTION EPIDURAL; INFILTRATION; INTRACAUDAL; PERINEURAL at 11:52

## 2021-05-18 ASSESSMENT — MIFFLIN-ST. JEOR: SCORE: 1703.91

## 2021-05-18 NOTE — LETTER
5/18/2021      RE: Sujata Valencia  [address] [place] [address]       ASSESSMENT/PLAN:     (M17.0) Primary osteoarthritis of both knees  (primary encounter diagnosis)  Comment: Bilateral SynviscOne injections; he is relocating to the discontinue area so will f/u prn  Plan: Large Joint Injection: bilateral knee          Anton Ha MD  May 18, 2021  12:07 PM      Pt is a 65 year old male last seen on 11/17/2020 here for follow up of:     Bilateral knee OA, right knee > left knee  New Injury/ Inciting activity? No, pain returned ~ 1-2 months ago after injections on 11/17/2020.  Swelling? No   Limited motion? Yes, due to pain   Giving way/ instability? No   Imaging? xrays 9/2019 - mild OA on R   Treatment? Bilateral Gel_one at last visit     Past Medical History:   Diagnosis Date     Arthritis      Back pains, lower      Chest pain 7/28/2014     Hypertension      Myocarditis (H)      Other abnormal glucose 7/21/2011     Other and unspecified hyperlipidemia 7/21/2011     Subclinical hypothyroidism       Current Outpatient Medications   Medication Sig Dispense Refill     aspirin 81 MG tablet Take 1 tablet by mouth daily.       atorvastatin (LIPITOR) 10 MG tablet Take 1 tablet (10 mg) by mouth daily 90 tablet 3     blood glucose (NO BRAND SPECIFIED) lancets standard Use to test blood sugar 3 times daily or as directed. 90 each 3     blood glucose (NO BRAND SPECIFIED) test strip Use to test blood sugar 3 times daily or as directed. 90 strip 3     blood glucose (NO BRAND SPECIFIED) test strip Use to test blood sugars 2-4 times daily or as directed 300 strip 3     blood glucose monitoring (NO BRAND SPECIFIED) meter device kit Use to test blood sugar 3 times daily or as directed. 1 kit 0     blood glucose monitoring (NO BRAND SPECIFIED) meter device kit Use to test blood sugar 2-4 times daily or as directed. 1 kit 0     blood glucose monitoring (NO BRAND SPECIFIED) test strip Use to test blood sugars 2 times  daily or as directed 100 strip 3     blood glucose monitoring (ONE TOUCH DELICA) lancets Use to test blood sugars 2 times daily or as directed. 100 each 3     clotrimazole-betamethasone (LOTRISONE) 1-0.05 % external cream Apply topically 2 times daily 15 g 3     Continuous Blood Gluc  (DEXCOM G5  KIT) ANTONIO Use to read blood sugars as per 's instructions. 1 each 0     cycloSPORINE (RESTASIS) 0.05 % ophthalmic emulsion Place 1 drop into both eyes 2 times daily 1 each 11     diclofenac (VOLTAREN) 1 % topical gel Apply 2 g topically 4 times daily 100 g 3     fluorometholone (FML LIQUIFILM) 0.1 % ophthalmic suspension Place 1 drop into the right eye 3 times daily 1 Bottle 0     fluorometholone (FML LIQUIFILM) 0.1 % ophthalmic suspension Place 1 drop Into the left eye 2 times daily 1 Bottle 0     gabapentin (NEURONTIN) 100 MG capsule TAKE 1 CAPSULE (100 MG) BY MOUTH 3 TIMES DAILY AS NEEDED FOR OTHER (NERVE PAIN) 90 capsule 0     gabapentin (NEURONTIN) 300 MG capsule TAKE 1 CAPSULE BY MOUTH THREE TIMES A  capsule 0     GINSENG PO Take by mouth daily       metFORMIN (GLUCOPHAGE-XR) 500 MG 24 hr tablet TAKE 1 TABLET BY MOUTH TWICE A DAY WITH MEALS 180 tablet 3     Multiple Vitamin (MULTIVITAMINS PO) Take 1 tablet by mouth daily.       neomycin-polymyxin-dexamethasone (MAXITROL) 3.5-66999-6.1 ophthalmic ointment Place 0.5 inches into both eyes At Bedtime 3.5 g 4     nicotine (NICORETTE) 2 MG gum PLACE 1 EACH (2 MG) INSIDE CHEEK AS NEEDED FOR SMOKING CESSATION. MAX 20 GUMS PER DAY. 880 each 3     olopatadine (PATANOL) 0.1 % ophthalmic solution Place 1 drop into both eyes 2 times daily 5 mL 11     Omega-3 Fatty Acids (FISH OIL PO) Take 1 g by mouth daily        psyllium (METAMUCIL) 58.6 % POWD Take 2 teaspoonful by mouth daily       sildenafil (VIAGRA) 100 MG tablet Take 0.5-1 tablets ( mg) by mouth daily as needed (ED) For additional refills, please schedule a follow-up appointment with  "Dr Harris for October 2020 at [phone] 8 tablet 2     trimethoprim-polymyxin b (POLYTRIM) 65941-0.1 UNIT/ML-% ophthalmic solution Place 1-2 drops Into the left eye every 6 hours 10 mL 0      Allergies   Allergen Reactions     Seasonal Allergies       ROS:   Gen- no fevers/chills   Rheum - no morning stiffness   Derm - no rash/ redness   Neuro - no numbness, no tingling   Remainder of ROS negative.     Exam:   Ht 1.81 m (5' 11.26\")   Wt 90.3 kg (199 lb)   BMI 27.55 kg/m      Bilateral Knee:   ROM: 0-130; Crepitus: YES   Effusion: NO ; Swelling: NO   Strength: Full in flexion/ extension   Tenderness: Patella - NO Medial joint line - YES - R>L; Lateral joint line - NO; Quad tendon - NO; Patellar tendon- NO; Hamstring - NO.   Patella: patellar compression - neg   Meniscus: Joe - neg    Procedure Note:    The pt was verbally consented. The R knee was sterilely prepped in usual fashion. Local anesthesia was achieved with 3-5 cc of 1% lidocaine. 6cc of SynviscOne was injected via lateral approach without complication. Pt tolerated procedure well    The pt was verbally consented. The L knee was sterilely prepped in usual fashion. Local anesthesia was achieved with 3-5 cc of 1% lidocaine. 6cc of SynviscOne was injected via lateral approach without complication. Pt tolerated procedure well        Large Joint Injection: bilateral knee    Date/Time: 5/18/2021 11:52 AM  Performed by: Anton Ha MD  Authorized by: Anton Ha MD     Indications:  Pain  Needle Size:  22 G  Guidance: landmark guided    Approach:  Anterolateral  Location:  Knee  Laterality:  Bilateral      Medications (Right):  48 mg hylan 48 MG/6ML; 3 mL lidocaine (PF) 1 %  Medications (Left):  48 mg hylan 48 MG/6ML; 3 mL lidocaine (PF) 1 %  Outcome:  Tolerated well, no immediate complications  Procedure discussed: discussed risks, benefits, and alternatives    Consent Given by:  Patient  Timeout: timeout called immediately prior to procedure     " DAPHNE Reilly MD

## 2021-05-18 NOTE — NURSING NOTE
[address] [address]  Dept: [phone]  ______________________________________________________________________________    Patient: Sujata Valencia   : 1955   MRN: [id number]   May 18, 2021    INVASIVE PROCEDURE SAFETY CHECKLIST    Date: May 18, 2021   Procedure: Bilateral Knee Synvisc-One Injections  Patient Name: Sujata Valencia  MRN: [id number]  YOB: 1955    Action: Complete sections as appropriate. Any discrepancy results in a HARD COPY until resolved.     PRE PROCEDURE:  Patient ID verified with 2 identifiers (name and  or MRN): Yes  Procedure and site verified with patient/designee (when able): Yes  Accurate consent documentation in medical record: Yes  H&P (or appropriate assessment) documented in medical record: Yes  H&P must be up to 20 days prior to procedure and updates within 24 hours of procedure as applicable: NA  Relevant diagnostic and radiology test results appropriately labeled and displayed as applicable: NA  Procedure site(s) marked with provider initials: NA    TIMEOUT:  Time-Out performed immediately prior to starting procedure, including verbal and active participation of all team members addressing the following:Yes  * Correct patient identify  * Confirmed that the correct side and site are marked  * An accurate procedure consent form  * Agreement on the procedure to be done  * Correct patient position  * Relevant images and results are properly labeled and appropriately displayed  * The need to administer antibiotics or fluids for irrigation purposes during the procedure as applicable   * Safety precautions based on patient history or medication use    DURING PROCEDURE: Verification of correct person, site, and procedures any time the responsibility for care of the patient is transferred to another member of the care team.       Prior to injection, verified patient identity using patient's name  and date of birth.  Due to injection administration, patient instructed to remain in clinic for 15 minutes  afterwards, and to report any adverse reaction to me immediately.    Joint injection was performed.      Lido x2  Drug Amount Wasted:  Yes: 2 mg/ml   Vial/Syringe: Single dose vial  Expiration Date:  12/01/2024    Synvisc-One x2  Drug Amount Wasted: No  Vial/Syringe: Single dose vial  Expiration Date:  12/01/2023      Melba Moss, ATC  May 18, 2021

## 2021-05-18 NOTE — PROGRESS NOTES
ASSESSMENT/PLAN:     (M17.0) Primary osteoarthritis of both knees  (primary encounter diagnosis)  Comment: Bilateral SynviscOne injections; he is [place]oc[place] to the discontinue area so will f/u prn  Plan: Large Joint Injection: bilateral knee          Anton Ha MD  May 18, 2021  12:07 PM      Pt is a 65 year old male last seen on 11/17/2020 here for follow up of:     Bilateral knee OA, right knee > left knee  New Injury/ Inciting activity? No, pain returned ~ 1-2 months ago after injections on 11/17/2020.  Swelling? No   Limited motion? Yes, due to pain   Giving way/ instability? No   Imaging? xrays 9/2019 - mild OA on R   Treatment? Bilateral Gel_one at last visit     Past Medical History:   Diagnosis Date     Arthritis      Back pains, lower      Chest pain 7/28/2014     Hypertension      Myocarditis (H)      Other abnormal glucose 7/21/2011     Other and unspecified hyperlipidemia 7/21/2011     Subclinical hypothyroidism       Current Outpatient Medications   Medication Sig Dispense Refill     aspirin 81 MG tablet Take 1 tablet by mouth daily.       atorvastatin (LIPITOR) 10 MG tablet Take 1 tablet (10 mg) by mouth daily 90 tablet 3     blood glucose (NO BRAND SPECIFIED) lancets standard Use to test blood sugar 3 times daily or as directed. 90 each 3     blood glucose (NO BRAND SPECIFIED) test strip Use to test blood sugar 3 times daily or as directed. 90 strip 3     blood glucose (NO BRAND SPECIFIED) test strip Use to test blood sugars 2-4 times daily or as directed 300 strip 3     blood glucose monitoring (NO BRAND SPECIFIED) meter device kit Use to test blood sugar 3 times daily or as directed. 1 kit 0     blood glucose monitoring (NO BRAND SPECIFIED) meter device kit Use to test blood sugar 2-4 times daily or as directed. 1 kit 0     blood glucose monitoring (NO BRAND SPECIFIED) test strip Use to test blood sugars 2 times daily or as directed 100 strip 3     blood glucose monitoring (ONE TOUCH DELICA)  lancets Use to test blood sugars 2 times daily or as directed. 100 each 3     clotrimazole-betamethasone (LOTRISONE) 1-0.05 % external cream Apply topically 2 times daily 15 g 3     Continuous Blood Gluc  (DEXCOM G5  KIT) ANTONIO Use to read blood sugars as per 's instructions. 1 each 0     cycloSPORINE (RESTASIS) 0.05 % ophthalmic emulsion Place 1 drop into both eyes 2 times daily 1 each 11     diclofenac (VOLTAREN) 1 % topical gel Apply 2 g topically 4 times daily 100 g 3     fluorometholone (FML LIQUIFILM) 0.1 % ophthalmic suspension Place 1 drop into the right eye 3 times daily 1 Bottle 0     fluorometholone (FML LIQUIFILM) 0.1 % ophthalmic suspension Place 1 drop Into the left eye 2 times daily 1 Bottle 0     gabapentin (NEURONTIN) 100 MG capsule TAKE 1 CAPSULE (100 MG) BY MOUTH 3 TIMES DAILY AS NEEDED FOR OTHER (NERVE PAIN) 90 capsule 0     gabapentin (NEURONTIN) 300 MG capsule TAKE 1 CAPSULE BY MOUTH THREE TIMES A  capsule 0     GINSENG PO Take by mouth daily       metFORMIN (GLUCOPHAGE-XR) 500 MG 24 hr tablet TAKE 1 TABLET BY MOUTH TWICE A DAY WITH MEALS 180 tablet 3     Multiple Vitamin (MULTIVITAMINS PO) Take 1 tablet by mouth daily.       neomycin-polymyxin-dexamethasone (MAXITROL) 3.5-25836-4.1 ophthalmic ointment Place 0.5 inches into both eyes At Bedtime 3.5 g 4     nicotine (NICORETTE) 2 MG gum PLACE 1 EACH (2 MG) INSIDE CHEEK AS NEEDED FOR SMOKING CESSATION. MAX 20 GUMS PER DAY. 880 each 3     olopatadine (PATANOL) 0.1 % ophthalmic solution Place 1 drop into both eyes 2 times daily 5 mL 11     Omega-3 Fatty Acids (FISH OIL PO) Take 1 g by mouth daily        psyllium (METAMUCIL) 58.6 % POWD Take 2 teaspoonful by mouth daily       sildenafil (VIAGRA) 100 MG tablet Take 0.5-1 tablets ( mg) by mouth daily as needed (ED) For additional refills, please schedule a follow-up appointment with Dr Harris for October 2020 at [phone] 8 tablet 2      "trimethoprim-polymyxin b (POLYTRIM) 53317-6.1 UNIT/ML-% ophthalmic solution Place 1-2 drops Into the left eye every 6 hours 10 mL 0      Allergies   Allergen Reactions     Seasonal Allergies       ROS:   Gen- no fevers/chills   Rheum - no morning stiffness   Derm - no rash/ redness   Neuro - no numbness, no tingling   Remainder of ROS negative.     Exam:   Ht 1.81 m (5' 11.26\")   Wt 90.3 kg (199 lb)   BMI 27.55 kg/m      Bilateral Knee:   ROM: 0-130; Crepitus: YES   Effusion: NO ; Swelling: NO   Strength: Full in flexion/ extension   Tenderness: Patella - NO Medial joint line - YES - R>L; Lateral joint line - NO; Quad tendon - NO; Patellar tendon- NO; Hamstring - NO.   Patella: patellar compression - neg   Meniscus: Joe - neg    Procedure Note:    The pt was verbally consented. The R knee was sterilely prepped in usual fashion. Local anesthesia was achieved with 3-5 cc of 1% lidocaine. 6cc of SynviscOne was injected via lateral approach without complication. Pt tolerated procedure well    The pt was verbally consented. The L knee was sterilely prepped in usual fashion. Local anesthesia was achieved with 3-5 cc of 1% lidocaine. 6cc of SynviscOne was injected via lateral approach without complication. Pt tolerated procedure well        Large Joint Injection: bilateral knee    Date/Time: 5/18/2021 11:52 AM  Performed by: Anton Ha MD  Authorized by: Anton Ha MD     Indications:  Pain  Needle Size:  22 G  Guidance: landmark guided    Approach:  Anterolateral  Location:  Knee  Laterality:  Bilateral      Medications (Right):  48 mg hylan 48 MG/6ML; 3 mL lidocaine (PF) 1 %  Medications (Left):  48 mg hylan 48 MG/6ML; 3 mL lidocaine (PF) 1 %  Outcome:  Tolerated well, no immediate complications  Procedure discussed: discussed risks, benefits, and alternatives    Consent Given by:  Patient  Timeout: timeout called immediately prior to procedure     DAPHNE Reilly        "

## 2021-09-11 ENCOUNTER — HEALTH MAINTENANCE LETTER (OUTPATIENT)
Age: 66
End: 2021-09-11

## 2021-09-11 DIAGNOSIS — N52.01 ERECTILE DYSFUNCTION DUE TO ARTERIAL INSUFFICIENCY: ICD-10-CM

## 2021-09-11 DIAGNOSIS — L98.9 SKIN LESION: ICD-10-CM

## 2021-09-11 DIAGNOSIS — N40.0 BENIGN NODULAR PROSTATIC HYPERPLASIA WITHOUT LOWER URINARY TRACT SYMPTOMS: ICD-10-CM

## 2021-09-11 DIAGNOSIS — Z12.11 SCREEN FOR COLON CANCER: ICD-10-CM

## 2021-09-11 DIAGNOSIS — Z12.5 SCREENING FOR PROSTATE CANCER: ICD-10-CM

## 2021-09-11 DIAGNOSIS — F17.200 SMOKING: ICD-10-CM

## 2021-09-11 DIAGNOSIS — I10 BENIGN ESSENTIAL HYPERTENSION: ICD-10-CM

## 2021-09-14 NOTE — TELEPHONE ENCOUNTER
nicotine (NICORETTE) 2 MG gum   PLACE 1 EACH (2 MG) INSIDE CHEEK AS NEEDED FOR SMOKING CESSATION. MAX 20 GUMS PER DAY. Last Written Prescription Date:  5/17/21  Last Fill Quantity: 880 each,   # refills: 3  Last Office Visit : 5/17/21  Future Office visit:  none    Too soon. Refill sent to [facility] [id number] [place] [facility] [address]

## 2021-11-06 ENCOUNTER — HEALTH MAINTENANCE LETTER (OUTPATIENT)
Age: 66
End: 2021-11-06

## 2021-11-20 DIAGNOSIS — E11.65 TYPE 2 DIABETES MELLITUS WITH HYPERGLYCEMIA, WITHOUT LONG-TERM CURRENT USE OF INSULIN (H): ICD-10-CM

## 2021-11-22 RX ORDER — METFORMIN HCL 500 MG
TABLET, EXTENDED RELEASE 24 HR ORAL
Qty: 180 TABLET | Refills: 3 | OUTPATIENT
Start: 2021-11-22

## 2021-11-22 NOTE — TELEPHONE ENCOUNTER
Last Clinic Visit: 5/17/2021  [facility] [place]  Creat and A1c overdue - clinic notified.

## 2022-01-01 ENCOUNTER — HEALTH MAINTENANCE LETTER (OUTPATIENT)
Age: 67
End: 2022-01-01

## 2022-04-08 DIAGNOSIS — L98.9 SKIN LESION: ICD-10-CM

## 2022-04-08 DIAGNOSIS — Z12.11 SCREEN FOR COLON CANCER: ICD-10-CM

## 2022-04-08 DIAGNOSIS — N52.01 ERECTILE DYSFUNCTION DUE TO ARTERIAL INSUFFICIENCY: ICD-10-CM

## 2022-04-08 DIAGNOSIS — N40.0 BENIGN NODULAR PROSTATIC HYPERPLASIA WITHOUT LOWER URINARY TRACT SYMPTOMS: ICD-10-CM

## 2022-04-08 DIAGNOSIS — I10 BENIGN ESSENTIAL HYPERTENSION: ICD-10-CM

## 2022-04-08 DIAGNOSIS — F17.200 SMOKING: ICD-10-CM

## 2022-04-08 DIAGNOSIS — Z12.5 SCREENING FOR PROSTATE CANCER: ICD-10-CM

## 2022-07-04 DIAGNOSIS — E78.00 HIGH CHOLESTEROL: ICD-10-CM

## 2022-07-08 RX ORDER — ATORVASTATIN CALCIUM 10 MG/1
10 TABLET, FILM COATED ORAL DAILY
Qty: 90 TABLET | Refills: 0 | Status: SHIPPED | OUTPATIENT
Start: 2022-07-08

## 2022-07-08 NOTE — TELEPHONE ENCOUNTER
Last Clinic Visit: 5/17/2021  [facility] [place]  No upcoming appointments  Overdue for LDL  Lab Test 12/23/20  0840   LDL 77     90 day refill per protocol, routed to clinic and scheduling for follow up

## 2022-07-13 DIAGNOSIS — E11.65 TYPE 2 DIABETES MELLITUS WITH HYPERGLYCEMIA, WITHOUT LONG-TERM CURRENT USE OF INSULIN (H): ICD-10-CM

## 2022-07-18 RX ORDER — METFORMIN HCL 500 MG
500 TABLET, EXTENDED RELEASE 24 HR ORAL 2 TIMES DAILY WITH MEALS
Qty: 180 TABLET | Refills: 0 | Status: SHIPPED | OUTPATIENT
Start: 2022-07-18

## 2022-07-18 NOTE — TELEPHONE ENCOUNTER
Last Clinic Visit: 5/17/2021  [facility] Internal [facility] [place]  Over due for follow-up and labs   90 day miguel refill sent to the pharmacy - including instructions for patient to call the clinic and schedule an appointment.

## 2022-08-09 DIAGNOSIS — R73.09 INCREASED GLUCOSE LEVEL: ICD-10-CM

## 2022-08-13 ENCOUNTER — HEALTH MAINTENANCE LETTER (OUTPATIENT)
Age: 67
End: 2022-08-13

## 2022-09-09 DIAGNOSIS — R73.09 INCREASED GLUCOSE LEVEL: ICD-10-CM

## 2022-09-11 NOTE — TELEPHONE ENCOUNTER
"Test strips    5/17/2021  [facility] Internal [facility] [place]     Wes Harris MD    Internal Medicine     Nv: none    \"  Comes in today and he is moving [facility] to be closer to his son and grand children\"  "

## 2022-09-24 DIAGNOSIS — R73.09 INCREASED GLUCOSE LEVEL: ICD-10-CM

## 2022-09-28 NOTE — TELEPHONE ENCOUNTER
"Last Clinic Visit: 5/17/2021 [facility] [place]    *per chart - [place].  Requesting pharmacy is located in [place].  Sent refill 1 month supply +1 with comments \"patient needs to establish care locally for future refills\"  "

## 2022-09-29 DIAGNOSIS — N52.01 ERECTILE DYSFUNCTION DUE TO ARTERIAL INSUFFICIENCY: ICD-10-CM

## 2022-09-29 DIAGNOSIS — F17.200 SMOKING: ICD-10-CM

## 2022-09-29 DIAGNOSIS — N40.0 BENIGN NODULAR PROSTATIC HYPERPLASIA WITHOUT LOWER URINARY TRACT SYMPTOMS: ICD-10-CM

## 2022-09-29 DIAGNOSIS — Z12.5 SCREENING FOR PROSTATE CANCER: ICD-10-CM

## 2022-09-29 DIAGNOSIS — Z12.11 SCREEN FOR COLON CANCER: ICD-10-CM

## 2022-09-29 DIAGNOSIS — I10 BENIGN ESSENTIAL HYPERTENSION: ICD-10-CM

## 2022-09-29 DIAGNOSIS — L98.9 SKIN LESION: ICD-10-CM

## 2022-10-29 ENCOUNTER — HEALTH MAINTENANCE LETTER (OUTPATIENT)
Age: 67
End: 2022-10-29

## 2022-11-02 DIAGNOSIS — N52.01 ERECTILE DYSFUNCTION DUE TO ARTERIAL INSUFFICIENCY: ICD-10-CM

## 2022-11-02 DIAGNOSIS — F17.200 SMOKING: ICD-10-CM

## 2022-11-02 DIAGNOSIS — N40.0 BENIGN NODULAR PROSTATIC HYPERPLASIA WITHOUT LOWER URINARY TRACT SYMPTOMS: ICD-10-CM

## 2022-11-02 DIAGNOSIS — L98.9 SKIN LESION: ICD-10-CM

## 2022-11-02 DIAGNOSIS — Z12.5 SCREENING FOR PROSTATE CANCER: ICD-10-CM

## 2022-11-02 DIAGNOSIS — I10 BENIGN ESSENTIAL HYPERTENSION: ICD-10-CM

## 2022-11-02 DIAGNOSIS — Z12.11 SCREEN FOR COLON CANCER: ICD-10-CM

## 2022-11-08 NOTE — TELEPHONE ENCOUNTER
NICOTINE 2 MG CHEWING GUM  Last Written Prescription Date:   10/4/2022  Last Fill Quantity: 880,   # refills: 0  Last Office Visit :  5/17/2021  Future Office visit:   12/5/2022    Routing refill request to provider for review/approval because:  Needing updated B/P on file for this med  Refer to clinic for review     BP Readings from Last 3 Encounters:   05/17/21 137/84   11/17/20 (!) 149/80   09/29/20 127/82       Aurora Escalante RN  Central Triage Red Flags/Med Refills

## 2022-12-21 DIAGNOSIS — Z12.5 SCREENING FOR PROSTATE CANCER: ICD-10-CM

## 2022-12-21 DIAGNOSIS — N40.0 BENIGN NODULAR PROSTATIC HYPERPLASIA WITHOUT LOWER URINARY TRACT SYMPTOMS: ICD-10-CM

## 2022-12-21 DIAGNOSIS — F17.200 SMOKING: ICD-10-CM

## 2022-12-21 DIAGNOSIS — Z12.11 SCREEN FOR COLON CANCER: ICD-10-CM

## 2022-12-21 DIAGNOSIS — I10 BENIGN ESSENTIAL HYPERTENSION: ICD-10-CM

## 2022-12-21 DIAGNOSIS — N52.01 ERECTILE DYSFUNCTION DUE TO ARTERIAL INSUFFICIENCY: ICD-10-CM

## 2022-12-21 DIAGNOSIS — L98.9 SKIN LESION: ICD-10-CM

## 2022-12-23 NOTE — TELEPHONE ENCOUNTER
NICOTINE 2 MG CHEWING GUM      Last Written Prescription Date:  11/8/22  Last Fill Quantity: 880,   # refills: 0  Last Office Visit : 5/17/21  Future Office visit:  none    Routing refill request to provider for review/approval because:  Overdue for OV/BP check

## 2023-01-25 DIAGNOSIS — Z12.5 SCREENING FOR PROSTATE CANCER: ICD-10-CM

## 2023-01-25 DIAGNOSIS — Z12.11 SCREEN FOR COLON CANCER: ICD-10-CM

## 2023-01-25 DIAGNOSIS — F17.200 SMOKING: ICD-10-CM

## 2023-01-25 DIAGNOSIS — N40.0 BENIGN NODULAR PROSTATIC HYPERPLASIA WITHOUT LOWER URINARY TRACT SYMPTOMS: ICD-10-CM

## 2023-01-25 DIAGNOSIS — L98.9 SKIN LESION: ICD-10-CM

## 2023-01-25 DIAGNOSIS — N52.01 ERECTILE DYSFUNCTION DUE TO ARTERIAL INSUFFICIENCY: ICD-10-CM

## 2023-01-25 DIAGNOSIS — I10 BENIGN ESSENTIAL HYPERTENSION: ICD-10-CM

## 2023-01-30 NOTE — TELEPHONE ENCOUNTER
nicotine (NICORETTE) 2 MG gum    Last Written Prescription Date:  11-  Last Fill Quantity: 880,   # refills: 0  Last Office Visit : 05-  Future Office visit:  Not on file    Routing refill request to provider for review/approval because:  Partial Cholinergic Nicotinic Agonist Agents Failed 01/25/2023 02:53 PM   Protocol Details  Blood pressure under 140/90 in past 12 months    Recent (12 mo) or future (30 days) visit within the authorizing provider's specialty     05/17/21 137/84   11/17/20 (!) 149/80   09/29/20 127/82

## 2023-04-08 ENCOUNTER — HEALTH MAINTENANCE LETTER (OUTPATIENT)
Age: 68
End: 2023-04-08

## 2023-11-12 ENCOUNTER — HEALTH MAINTENANCE LETTER (OUTPATIENT)
Age: 68
End: 2023-11-12

## 2024-06-09 ENCOUNTER — HEALTH MAINTENANCE LETTER (OUTPATIENT)
Age: 69
End: 2024-06-09

## (undated) RX ORDER — LIDOCAINE HYDROCHLORIDE 10 MG/ML
INJECTION, SOLUTION EPIDURAL; INFILTRATION; INTRACAUDAL; PERINEURAL
Status: DISPENSED
Start: 2019-09-12

## (undated) RX ORDER — LIDOCAINE HYDROCHLORIDE 10 MG/ML
INJECTION, SOLUTION EPIDURAL; INFILTRATION; INTRACAUDAL; PERINEURAL
Status: DISPENSED
Start: 2020-11-17

## (undated) RX ORDER — LIDOCAINE HYDROCHLORIDE 10 MG/ML
INJECTION, SOLUTION EPIDURAL; INFILTRATION; INTRACAUDAL; PERINEURAL
Status: DISPENSED
Start: 2021-05-18

## (undated) RX ORDER — TRIAMCINOLONE ACETONIDE 40 MG/ML
INJECTION, SUSPENSION INTRA-ARTICULAR; INTRAMUSCULAR
Status: DISPENSED
Start: 2019-09-12